# Patient Record
Sex: FEMALE | Race: WHITE | HISPANIC OR LATINO | Employment: OTHER | URBAN - METROPOLITAN AREA
[De-identification: names, ages, dates, MRNs, and addresses within clinical notes are randomized per-mention and may not be internally consistent; named-entity substitution may affect disease eponyms.]

---

## 2019-08-18 ENCOUNTER — HOSPITAL ENCOUNTER (INPATIENT)
Facility: MEDICAL CENTER | Age: 69
LOS: 1 days | DRG: 066 | End: 2019-08-21
Attending: EMERGENCY MEDICINE | Admitting: HOSPITALIST
Payer: MEDICARE

## 2019-08-18 ENCOUNTER — APPOINTMENT (OUTPATIENT)
Dept: RADIOLOGY | Facility: MEDICAL CENTER | Age: 69
DRG: 066 | End: 2019-08-18
Attending: EMERGENCY MEDICINE
Payer: MEDICARE

## 2019-08-18 DIAGNOSIS — R79.89 ELEVATED TROPONIN: ICD-10-CM

## 2019-08-18 DIAGNOSIS — E86.0 DEHYDRATION: ICD-10-CM

## 2019-08-18 DIAGNOSIS — R42 VERTIGO: ICD-10-CM

## 2019-08-18 DIAGNOSIS — I10 ESSENTIAL HYPERTENSION: ICD-10-CM

## 2019-08-18 DIAGNOSIS — I63.81 STROKE, LACUNAR (HCC): ICD-10-CM

## 2019-08-18 DIAGNOSIS — E11.65 TYPE 2 DIABETES MELLITUS WITH HYPERGLYCEMIA, WITHOUT LONG-TERM CURRENT USE OF INSULIN (HCC): ICD-10-CM

## 2019-08-18 DIAGNOSIS — R11.2 NON-INTRACTABLE VOMITING WITH NAUSEA, UNSPECIFIED VOMITING TYPE: ICD-10-CM

## 2019-08-18 LAB
ALBUMIN SERPL BCP-MCNC: 3.9 G/DL (ref 3.2–4.9)
ALBUMIN/GLOB SERPL: 1.2 G/DL
ALP SERPL-CCNC: 97 U/L (ref 30–99)
ALT SERPL-CCNC: 24 U/L (ref 2–50)
ANION GAP SERPL CALC-SCNC: 12 MMOL/L (ref 0–11.9)
AST SERPL-CCNC: 17 U/L (ref 12–45)
BASOPHILS # BLD AUTO: 0.3 % (ref 0–1.8)
BASOPHILS # BLD: 0.03 K/UL (ref 0–0.12)
BILIRUB SERPL-MCNC: 0.5 MG/DL (ref 0.1–1.5)
BUN SERPL-MCNC: 18 MG/DL (ref 8–22)
CALCIUM SERPL-MCNC: 9.4 MG/DL (ref 8.5–10.5)
CHLORIDE SERPL-SCNC: 101 MMOL/L (ref 96–112)
CO2 SERPL-SCNC: 25 MMOL/L (ref 20–33)
CREAT SERPL-MCNC: 0.6 MG/DL (ref 0.5–1.4)
EKG IMPRESSION: NORMAL
EOSINOPHIL # BLD AUTO: 0.14 K/UL (ref 0–0.51)
EOSINOPHIL NFR BLD: 1.4 % (ref 0–6.9)
ERYTHROCYTE [DISTWIDTH] IN BLOOD BY AUTOMATED COUNT: 50.4 FL (ref 35.9–50)
GLOBULIN SER CALC-MCNC: 3.2 G/DL (ref 1.9–3.5)
GLUCOSE BLD-MCNC: 190 MG/DL (ref 65–99)
GLUCOSE SERPL-MCNC: 201 MG/DL (ref 65–99)
HCT VFR BLD AUTO: 41.3 % (ref 37–47)
HGB BLD-MCNC: 13.8 G/DL (ref 12–16)
IMM GRANULOCYTES # BLD AUTO: 0.02 K/UL (ref 0–0.11)
IMM GRANULOCYTES NFR BLD AUTO: 0.2 % (ref 0–0.9)
LYMPHOCYTES # BLD AUTO: 3.01 K/UL (ref 1–4.8)
LYMPHOCYTES NFR BLD: 29.1 % (ref 22–41)
MCH RBC QN AUTO: 31.3 PG (ref 27–33)
MCHC RBC AUTO-ENTMCNC: 33.4 G/DL (ref 33.6–35)
MCV RBC AUTO: 93.7 FL (ref 81.4–97.8)
MONOCYTES # BLD AUTO: 0.59 K/UL (ref 0–0.85)
MONOCYTES NFR BLD AUTO: 5.7 % (ref 0–13.4)
NEUTROPHILS # BLD AUTO: 6.57 K/UL (ref 2–7.15)
NEUTROPHILS NFR BLD: 63.3 % (ref 44–72)
NRBC # BLD AUTO: 0 K/UL
NRBC BLD-RTO: 0 /100 WBC
PLATELET # BLD AUTO: 172 K/UL (ref 164–446)
PMV BLD AUTO: 10.4 FL (ref 9–12.9)
POTASSIUM SERPL-SCNC: 3.8 MMOL/L (ref 3.6–5.5)
PROT SERPL-MCNC: 7.1 G/DL (ref 6–8.2)
RBC # BLD AUTO: 4.41 M/UL (ref 4.2–5.4)
SODIUM SERPL-SCNC: 138 MMOL/L (ref 135–145)
TROPONIN T SERPL-MCNC: 21 NG/L (ref 6–19)
TROPONIN T SERPL-MCNC: 22 NG/L (ref 6–19)
WBC # BLD AUTO: 10.4 K/UL (ref 4.8–10.8)

## 2019-08-18 PROCEDURE — 93005 ELECTROCARDIOGRAM TRACING: CPT

## 2019-08-18 PROCEDURE — 84484 ASSAY OF TROPONIN QUANT: CPT

## 2019-08-18 PROCEDURE — 71045 X-RAY EXAM CHEST 1 VIEW: CPT

## 2019-08-18 PROCEDURE — 80053 COMPREHEN METABOLIC PANEL: CPT

## 2019-08-18 PROCEDURE — 93005 ELECTROCARDIOGRAM TRACING: CPT | Performed by: EMERGENCY MEDICINE

## 2019-08-18 PROCEDURE — 70450 CT HEAD/BRAIN W/O DYE: CPT

## 2019-08-18 PROCEDURE — 700102 HCHG RX REV CODE 250 W/ 637 OVERRIDE(OP): Performed by: EMERGENCY MEDICINE

## 2019-08-18 PROCEDURE — 85025 COMPLETE CBC W/AUTO DIFF WBC: CPT

## 2019-08-18 PROCEDURE — 99285 EMERGENCY DEPT VISIT HI MDM: CPT

## 2019-08-18 PROCEDURE — 96374 THER/PROPH/DIAG INJ IV PUSH: CPT

## 2019-08-18 PROCEDURE — 83036 HEMOGLOBIN GLYCOSYLATED A1C: CPT

## 2019-08-18 PROCEDURE — A9270 NON-COVERED ITEM OR SERVICE: HCPCS | Performed by: EMERGENCY MEDICINE

## 2019-08-18 PROCEDURE — 700111 HCHG RX REV CODE 636 W/ 250 OVERRIDE (IP): Performed by: EMERGENCY MEDICINE

## 2019-08-18 PROCEDURE — 96372 THER/PROPH/DIAG INJ SC/IM: CPT

## 2019-08-18 PROCEDURE — 82962 GLUCOSE BLOOD TEST: CPT

## 2019-08-18 RX ORDER — ENALAPRIL MALEATE 10 MG/1
10 TABLET ORAL DAILY
Status: ON HOLD | COMMUNITY
End: 2019-08-28

## 2019-08-18 RX ORDER — LORAZEPAM 2 MG/ML
0.5 INJECTION INTRAMUSCULAR ONCE
Status: COMPLETED | OUTPATIENT
Start: 2019-08-18 | End: 2019-08-18

## 2019-08-18 RX ORDER — SODIUM PHOSPHATE,MONO-DIBASIC 19G-7G/118
500 ENEMA (ML) RECTAL DAILY
Status: ON HOLD | COMMUNITY
End: 2019-08-28

## 2019-08-18 RX ORDER — MECLIZINE HYDROCHLORIDE 25 MG/1
25 TABLET ORAL EVERY 6 HOURS PRN
Qty: 20 TAB | Refills: 0 | Status: ON HOLD | OUTPATIENT
Start: 2019-08-18 | End: 2019-08-28

## 2019-08-18 RX ORDER — ONDANSETRON 4 MG/1
4 TABLET, ORALLY DISINTEGRATING ORAL EVERY 8 HOURS PRN
Qty: 10 TAB | Refills: 0 | Status: ON HOLD | OUTPATIENT
Start: 2019-08-18 | End: 2019-08-28

## 2019-08-18 RX ORDER — MECLIZINE HYDROCHLORIDE 25 MG/1
25 TABLET ORAL ONCE
Status: COMPLETED | OUTPATIENT
Start: 2019-08-18 | End: 2019-08-18

## 2019-08-18 RX ORDER — LORAZEPAM 0.5 MG/1
0.5 TABLET ORAL EVERY 8 HOURS PRN
Qty: 15 TAB | Refills: 0 | Status: ON HOLD | OUTPATIENT
Start: 2019-08-18 | End: 2019-08-28

## 2019-08-18 RX ADMIN — LORAZEPAM 0.5 MG: 2 INJECTION INTRAMUSCULAR; INTRAVENOUS at 22:18

## 2019-08-18 RX ADMIN — MECLIZINE HYDROCHLORIDE 25 MG: 25 TABLET ORAL at 20:32

## 2019-08-18 SDOH — HEALTH STABILITY: MENTAL HEALTH: HOW OFTEN DO YOU HAVE A DRINK CONTAINING ALCOHOL?: NEVER

## 2019-08-19 ENCOUNTER — APPOINTMENT (OUTPATIENT)
Dept: RADIOLOGY | Facility: MEDICAL CENTER | Age: 69
DRG: 066 | End: 2019-08-19
Attending: INTERNAL MEDICINE
Payer: MEDICARE

## 2019-08-19 PROBLEM — E11.65 TYPE 2 DIABETES MELLITUS WITH HYPERGLYCEMIA, WITHOUT LONG-TERM CURRENT USE OF INSULIN (HCC): Status: ACTIVE | Noted: 2019-08-19

## 2019-08-19 PROBLEM — R42 VERTIGO: Status: ACTIVE | Noted: 2019-08-19

## 2019-08-19 PROBLEM — R79.89 ELEVATED TROPONIN: Status: ACTIVE | Noted: 2019-08-19

## 2019-08-19 PROBLEM — I10 ESSENTIAL HYPERTENSION: Status: ACTIVE | Noted: 2019-08-19

## 2019-08-19 LAB
EKG IMPRESSION: NORMAL
EKG IMPRESSION: NORMAL
EST. AVERAGE GLUCOSE BLD GHB EST-MCNC: 235 MG/DL
GLUCOSE BLD-MCNC: 105 MG/DL (ref 65–99)
GLUCOSE BLD-MCNC: 119 MG/DL (ref 65–99)
GLUCOSE BLD-MCNC: 123 MG/DL (ref 65–99)
GLUCOSE BLD-MCNC: 245 MG/DL (ref 65–99)
HBA1C MFR BLD: 9.8 % (ref 0–5.6)
TROPONIN T SERPL-MCNC: 17 NG/L (ref 6–19)

## 2019-08-19 PROCEDURE — 700105 HCHG RX REV CODE 258: Performed by: EMERGENCY MEDICINE

## 2019-08-19 PROCEDURE — 82962 GLUCOSE BLOOD TEST: CPT | Mod: 91

## 2019-08-19 PROCEDURE — G0378 HOSPITAL OBSERVATION PER HR: HCPCS

## 2019-08-19 PROCEDURE — 700117 HCHG RX CONTRAST REV CODE 255: Performed by: INTERNAL MEDICINE

## 2019-08-19 PROCEDURE — 99243 OFF/OP CNSLTJ NEW/EST LOW 30: CPT | Performed by: PSYCHIATRY & NEUROLOGY

## 2019-08-19 PROCEDURE — 36415 COLL VENOUS BLD VENIPUNCTURE: CPT

## 2019-08-19 PROCEDURE — 700105 HCHG RX REV CODE 258: Performed by: INTERNAL MEDICINE

## 2019-08-19 PROCEDURE — 93005 ELECTROCARDIOGRAM TRACING: CPT | Performed by: HOSPITALIST

## 2019-08-19 PROCEDURE — 84484 ASSAY OF TROPONIN QUANT: CPT

## 2019-08-19 PROCEDURE — 700102 HCHG RX REV CODE 250 W/ 637 OVERRIDE(OP): Performed by: INTERNAL MEDICINE

## 2019-08-19 PROCEDURE — 96372 THER/PROPH/DIAG INJ SC/IM: CPT

## 2019-08-19 PROCEDURE — 99220 PR INITIAL OBSERVATION CARE,LEVL III: CPT | Performed by: INTERNAL MEDICINE

## 2019-08-19 PROCEDURE — 700111 HCHG RX REV CODE 636 W/ 250 OVERRIDE (IP): Performed by: INTERNAL MEDICINE

## 2019-08-19 PROCEDURE — A9270 NON-COVERED ITEM OR SERVICE: HCPCS | Performed by: INTERNAL MEDICINE

## 2019-08-19 PROCEDURE — 93010 ELECTROCARDIOGRAM REPORT: CPT | Performed by: INTERNAL MEDICINE

## 2019-08-19 PROCEDURE — 70498 CT ANGIOGRAPHY NECK: CPT

## 2019-08-19 PROCEDURE — 93005 ELECTROCARDIOGRAM TRACING: CPT | Performed by: INTERNAL MEDICINE

## 2019-08-19 PROCEDURE — 70551 MRI BRAIN STEM W/O DYE: CPT

## 2019-08-19 PROCEDURE — 70496 CT ANGIOGRAPHY HEAD: CPT

## 2019-08-19 RX ORDER — ACETAMINOPHEN 325 MG/1
650 TABLET ORAL EVERY 6 HOURS PRN
Status: DISCONTINUED | OUTPATIENT
Start: 2019-08-19 | End: 2019-08-21 | Stop reason: HOSPADM

## 2019-08-19 RX ORDER — ATORVASTATIN CALCIUM 80 MG/1
80 TABLET, FILM COATED ORAL EVERY EVENING
Status: DISCONTINUED | OUTPATIENT
Start: 2019-08-19 | End: 2019-08-21 | Stop reason: HOSPADM

## 2019-08-19 RX ORDER — HYDRALAZINE HYDROCHLORIDE 20 MG/ML
10 INJECTION INTRAMUSCULAR; INTRAVENOUS
Status: DISCONTINUED | OUTPATIENT
Start: 2019-08-19 | End: 2019-08-21 | Stop reason: HOSPADM

## 2019-08-19 RX ORDER — ONDANSETRON 2 MG/ML
4 INJECTION INTRAMUSCULAR; INTRAVENOUS EVERY 4 HOURS PRN
Status: DISCONTINUED | OUTPATIENT
Start: 2019-08-19 | End: 2019-08-21 | Stop reason: HOSPADM

## 2019-08-19 RX ORDER — AMOXICILLIN 250 MG
2 CAPSULE ORAL 2 TIMES DAILY
Status: DISCONTINUED | OUTPATIENT
Start: 2019-08-19 | End: 2019-08-21 | Stop reason: HOSPADM

## 2019-08-19 RX ORDER — LABETALOL HYDROCHLORIDE 5 MG/ML
10 INJECTION, SOLUTION INTRAVENOUS EVERY 4 HOURS PRN
Status: DISCONTINUED | OUTPATIENT
Start: 2019-08-19 | End: 2019-08-21 | Stop reason: HOSPADM

## 2019-08-19 RX ORDER — SODIUM CHLORIDE 9 MG/ML
INJECTION, SOLUTION INTRAVENOUS CONTINUOUS
Status: DISCONTINUED | OUTPATIENT
Start: 2019-08-19 | End: 2019-08-19

## 2019-08-19 RX ORDER — POLYETHYLENE GLYCOL 3350 17 G/17G
1 POWDER, FOR SOLUTION ORAL
Status: DISCONTINUED | OUTPATIENT
Start: 2019-08-19 | End: 2019-08-21 | Stop reason: HOSPADM

## 2019-08-19 RX ORDER — SODIUM CHLORIDE 9 MG/ML
INJECTION, SOLUTION INTRAVENOUS CONTINUOUS
Status: DISCONTINUED | OUTPATIENT
Start: 2019-08-19 | End: 2019-08-21 | Stop reason: HOSPADM

## 2019-08-19 RX ORDER — ASPIRIN 81 MG/1
324 TABLET, CHEWABLE ORAL DAILY
Status: DISCONTINUED | OUTPATIENT
Start: 2019-08-19 | End: 2019-08-21

## 2019-08-19 RX ORDER — ONDANSETRON 4 MG/1
4 TABLET, ORALLY DISINTEGRATING ORAL EVERY 4 HOURS PRN
Status: DISCONTINUED | OUTPATIENT
Start: 2019-08-19 | End: 2019-08-21 | Stop reason: HOSPADM

## 2019-08-19 RX ORDER — MECLIZINE HYDROCHLORIDE 25 MG/1
25 TABLET ORAL 3 TIMES DAILY PRN
Status: DISCONTINUED | OUTPATIENT
Start: 2019-08-19 | End: 2019-08-20

## 2019-08-19 RX ORDER — ASPIRIN 300 MG/1
300 SUPPOSITORY RECTAL DAILY
Status: DISCONTINUED | OUTPATIENT
Start: 2019-08-19 | End: 2019-08-21

## 2019-08-19 RX ORDER — ASPIRIN 325 MG
325 TABLET ORAL DAILY
Status: DISCONTINUED | OUTPATIENT
Start: 2019-08-19 | End: 2019-08-21

## 2019-08-19 RX ORDER — BISACODYL 10 MG
10 SUPPOSITORY, RECTAL RECTAL
Status: DISCONTINUED | OUTPATIENT
Start: 2019-08-19 | End: 2019-08-21 | Stop reason: HOSPADM

## 2019-08-19 RX ADMIN — SODIUM CHLORIDE: 9 INJECTION, SOLUTION INTRAVENOUS at 00:21

## 2019-08-19 RX ADMIN — SODIUM CHLORIDE: 9 INJECTION, SOLUTION INTRAVENOUS at 02:00

## 2019-08-19 RX ADMIN — ASPIRIN 325 MG: 325 TABLET, FILM COATED ORAL at 03:14

## 2019-08-19 RX ADMIN — SODIUM CHLORIDE: 9 INJECTION, SOLUTION INTRAVENOUS at 17:36

## 2019-08-19 RX ADMIN — ENOXAPARIN SODIUM 40 MG: 100 INJECTION SUBCUTANEOUS at 05:57

## 2019-08-19 RX ADMIN — ATORVASTATIN CALCIUM 80 MG: 80 TABLET, FILM COATED ORAL at 17:34

## 2019-08-19 RX ADMIN — IOHEXOL 80 ML: 350 INJECTION, SOLUTION INTRAVENOUS at 02:58

## 2019-08-19 RX ADMIN — INSULIN HUMAN 2 UNITS: 100 INJECTION, SOLUTION PARENTERAL at 17:42

## 2019-08-19 RX ADMIN — SENNOSIDES, DOCUSATE SODIUM 2 TABLET: 50; 8.6 TABLET, FILM COATED ORAL at 06:00

## 2019-08-19 ASSESSMENT — LIFESTYLE VARIABLES
EVER_SMOKED: NEVER
CONSUMPTION TOTAL: NEGATIVE
TOTAL SCORE: 0
ALCOHOL_USE: NO
TOTAL SCORE: 0
EVER FELT BAD OR GUILTY ABOUT YOUR DRINKING: NO
HAVE PEOPLE ANNOYED YOU BY CRITICIZING YOUR DRINKING: NO
HAVE YOU EVER FELT YOU SHOULD CUT DOWN ON YOUR DRINKING: NO
EVER HAD A DRINK FIRST THING IN THE MORNING TO STEADY YOUR NERVES TO GET RID OF A HANGOVER: NO
AVERAGE NUMBER OF DAYS PER WEEK YOU HAVE A DRINK CONTAINING ALCOHOL: 0
ON A TYPICAL DAY WHEN YOU DRINK ALCOHOL HOW MANY DRINKS DO YOU HAVE: 0
ALCOHOL_USE: NO
TOTAL SCORE: 0
HOW MANY TIMES IN THE PAST YEAR HAVE YOU HAD 5 OR MORE DRINKS IN A DAY: 0
EVER_SMOKED: NEVER

## 2019-08-19 ASSESSMENT — ENCOUNTER SYMPTOMS
SHORTNESS OF BREATH: 1
FOCAL WEAKNESS: 0
MYALGIAS: 0
DIARRHEA: 0
DIZZINESS: 1
BRUISES/BLEEDS EASILY: 0
BLOOD IN STOOL: 0
COUGH: 0
ABDOMINAL PAIN: 0
WHEEZING: 0
SEIZURES: 0
CHILLS: 0
BLURRED VISION: 0
HEADACHES: 0
DIAPHORESIS: 0
VOMITING: 1
PALPITATIONS: 0
FLANK PAIN: 0
FEVER: 0
SORE THROAT: 0
NECK PAIN: 0
BACK PAIN: 0
SPUTUM PRODUCTION: 0
NAUSEA: 1

## 2019-08-19 ASSESSMENT — COGNITIVE AND FUNCTIONAL STATUS - GENERAL
DRESSING REGULAR LOWER BODY CLOTHING: A LOT
SUGGESTED CMS G CODE MODIFIER MOBILITY: CK
WALKING IN HOSPITAL ROOM: A LOT
MOVING FROM LYING ON BACK TO SITTING ON SIDE OF FLAT BED: A LITTLE
DRESSING REGULAR UPPER BODY CLOTHING: A LITTLE
SUGGESTED CMS G CODE MODIFIER DAILY ACTIVITY: CK
HELP NEEDED FOR BATHING: A LITTLE
TURNING FROM BACK TO SIDE WHILE IN FLAT BAD: A LITTLE
PERSONAL GROOMING: A LITTLE
STANDING UP FROM CHAIR USING ARMS: A LOT
DAILY ACTIVITIY SCORE: 17
MOBILITY SCORE: 15
MOVING TO AND FROM BED TO CHAIR: A LITTLE
TOILETING: A LOT
CLIMB 3 TO 5 STEPS WITH RAILING: A LOT

## 2019-08-19 ASSESSMENT — PATIENT HEALTH QUESTIONNAIRE - PHQ9
1. LITTLE INTEREST OR PLEASURE IN DOING THINGS: NOT AT ALL
SUM OF ALL RESPONSES TO PHQ9 QUESTIONS 1 AND 2: 0
2. FEELING DOWN, DEPRESSED, IRRITABLE, OR HOPELESS: NOT AT ALL

## 2019-08-19 NOTE — ED NOTES
Pt reports she remains with some dizziness with eyes open, some nausea. Pt medicated per MAR. Pt resting quietly, cardiopulmonary monitoring remains in place. Family at bedside.

## 2019-08-19 NOTE — PROGRESS NOTES
The attending hospitalist beginning at 08:00 is Dr. Danny Ramsey, please call this physician for orders, updates, and questions.

## 2019-08-19 NOTE — ASSESSMENT & PLAN NOTE
Uncontrolled with hyperglycemia  Start on insulin sliding scale with serial Accu-Checks  hemoglobin A1c 9.8  Hypoglycemic protocol in place  Started patient on Januvia

## 2019-08-19 NOTE — ASSESSMENT & PLAN NOTE
Denied active chest pain but did report shortness of breath, rule out ACS  Continuous cardiac monitoring with serial EKG and troponin  2D echo found no segmental hypokinesis  Patient has been given full dose of aspirin

## 2019-08-19 NOTE — ED NOTES
Family remains at bedside.  Updated on POC.  Awaiting room assignment.  FSBG = 105.  Swallow eval completed by NOC shift RN, given breakfast tray.

## 2019-08-19 NOTE — ASSESSMENT & PLAN NOTE
Severe and sudden onset vertigo concerning for posterior circulation stroke  Patient is past the tPA window  CT of the head found no large vessel occlusion and not a candidate for neuro intervention  Patient will be placed on continuous cardiac monitoring to evaluate for any arrhythmias  Q4 hours neuro checks  MRI of the brain found an lacunar stroke  Check 2-D echo found no evidence of emboli  Patient will be started on full dose aspirin and high intensity statin  Presence of hypertension discontinued by neurology  Check TSH, lipid panel and hemoglobin A1c  PTOT and ST evaluation  Meclizine as needed

## 2019-08-19 NOTE — ED NOTES
"Chief Complaint   Patient presents with   • Dizziness     \"room spinning\"   • N/V   • Shortness of Breath     started around 12     Pt wheeled to triage, Cam[place] speaking with above complaints.   fsbs 120  Pt to ekg, protocol ordered.  "

## 2019-08-19 NOTE — ED NOTES
Med rec updated and complete. Allergies reviewed.  All medications come from [place]. Pt actually does not have active prescriptions from [place].

## 2019-08-19 NOTE — ED NOTES
Assist RN:  PT returned from CT Scan.  Required 2 person assist to ambulate to BR.  Demonstrated a lack of balance and listing gait to L side.  Reeducated about falls precautions.  Call light at bedside.

## 2019-08-19 NOTE — ED PROVIDER NOTES
"ED Provider Note    CHIEF COMPLAINT  Chief Complaint   Patient presents with   • Dizziness     \"room spinning\"   • N/V   • Shortness of Breath     started around 12       HPI  Noemi Lopez is a 69 y.o. female who presents with severe spinning dizziness whenever she opens her eyes patient and has vomited perhaps 20 times secondary to the dizziness.  Onset at 10 AM.  Vertigo abates when she closes her eyes and does not move her head.  Denies chest pain.  No headache, facial droop, speech difficulty or focal weakness.  She has never had a stroke or a TIA.  She does have diabetes and hypertension.  No atrial fibrillation or known heart disease.    REVIEW OF SYSTEMS  Pertinent positives include: Spinning dizziness with eye opening and head turning, vertigo triggering vomiting.  Pertinent negatives include: Diarrhea, fever, abdominal pain, chest pain, focal weakness.  10+ systems reviewed and negative.      PAST MEDICAL HISTORY  Past Medical History:   Diagnosis Date   • Diabetes (HCC)    • Hypertension        SOCIAL HISTORY  Social History     Tobacco Use   • Smoking status: Never Smoker   • Smokeless tobacco: Never Used   Substance Use Topics   • Alcohol use: Never     Frequency: Never   • Drug use: Never     Social History     Substance and Sexual Activity   Drug Use Never       CURRENT MEDICATIONS  No current facility-administered medications for this encounter.      Current Outpatient Medications   Medication Sig Dispense Refill   • enalapril (VASOTEC) 10 MG Tab Take 10 mg by mouth every day.     • metFORMIN (GLUCOPHAGE) 850 MG Tab Take 850 mg by mouth 2 times a day, with meals.     • GLUCOSAMINE-CHONDROITIN PO Take  by mouth.     • Non Formulary Request Indications: glibenclamida 5mg tab daily         ALLERGIES  Allergies   Allergen Reactions   • Pcn [Penicillins]    • Thiamine        PHYSICAL EXAM  VITAL SIGNS: /85   Pulse 87   Temp 36.4 °C (97.5 °F) (Temporal)   Resp 16   Wt 67 kg (147 lb 11.3 " oz)   SpO2 96%   Reviewed and elevated blood pressure  Constitutional: Well developed, Well nourished, appearing holding her eyes closed.  HENT: Normocephalic, atraumatic, bilateral external ears normal, oropharynx moist, No exudates or erythema.   Eyes: PERRLA, conjunctiva pink, no scleral icterus.  No nystagmus on eye opening but eye-opening does trigger vertigo  Cardiovascular: Regular S1-S2 without murmur, rub, gallop.  No dependent edema or calf cords, no JVD or bruit.  Respiratory: No rales, rhonchi, wheeze.  Gastrointestinal: Soft, nontender.  Skin: No erythema, no rash.   Genitourinary:  No costovertebral angle tenderness.   Neurologic: Alert & oriented x 3, cranial nerves 2-12 intact, grasp, biceps, extensor hallucis longus and ankle plantarflexion symmetric.  Finger-nose-finger and fine motor without dysmetria.  No focal deficit noted.  Psychiatric: Affect normal, Judgment normal, Mood normal.     DIFFERENTIAL DIAGNOSIS:  Benign positional vertigo, vestibular neuronitis, acoustic neuroma, doubt Ménière's disease, cerebeller hemorrhage, TIA, Stroke  EKG  EKG Interpretation 7:55 PM    Interpreted by me.  Indication: Vertigo    Rhythm: normal sinus   Rate: Normal at 83  Axis: normal  Ectopy: none  Conduction: normal  ST/T Waves: Half millimeter ST elevation in 2, 3 and aVF.  No ectopy.  Q Waves: none  R Wave progression: normal  Comparison: None    Clinical Impression: Sinus rhythm with nonspecific inferior ST change  .    RADIOLOGY/PROCEDURES  CT-HEAD W/O   Final Result      No CT evidence of acute infarct, hemorrhage or mass.      DX-CHEST-PORTABLE (1 VIEW)   Final Result      1.  There is no acute cardiopulmonary process. The cardiac silhouette is upper limits of normal in size.       LABORATORY:  Results for orders placed or performed during the hospital encounter of 08/18/19   CBC WITH DIFFERENTIAL   Result Value Ref Range    WBC 10.4 4.8 - 10.8 K/uL    RBC 4.41 4.20 - 5.40 M/uL    Hemoglobin 13.8 12.0  - 16.0 g/dL    Hematocrit 41.3 37.0 - 47.0 %    MCV 93.7 81.4 - 97.8 fL    MCH 31.3 27.0 - 33.0 pg    MCHC 33.4 (L) 33.6 - 35.0 g/dL    RDW 50.4 (H) 35.9 - 50.0 fL    Platelet Count 172 164 - 446 K/uL    MPV 10.4 9.0 - 12.9 fL    Neutrophils-Polys 63.30 44.00 - 72.00 %    Lymphocytes 29.10 22.00 - 41.00 %    Monocytes 5.70 0.00 - 13.40 %    Eosinophils 1.40 0.00 - 6.90 %    Basophils 0.30 0.00 - 1.80 %    Immature Granulocytes 0.20 0.00 - 0.90 %    Nucleated RBC 0.00 /100 WBC    Neutrophils (Absolute) 6.57 2.00 - 7.15 K/uL    Lymphs (Absolute) 3.01 1.00 - 4.80 K/uL    Monos (Absolute) 0.59 0.00 - 0.85 K/uL    Eos (Absolute) 0.14 0.00 - 0.51 K/uL    Baso (Absolute) 0.03 0.00 - 0.12 K/uL    Immature Granulocytes (abs) 0.02 0.00 - 0.11 K/uL    NRBC (Absolute) 0.00 K/uL   COMP METABOLIC PANEL   Result Value Ref Range    Sodium 138 135 - 145 mmol/L    Potassium 3.8 3.6 - 5.5 mmol/L    Chloride 101 96 - 112 mmol/L    Co2 25 20 - 33 mmol/L    Anion Gap 12.0 (H) 0.0 - 11.9    Glucose 201 (H) 65 - 99 mg/dL    Bun 18 8 - 22 mg/dL    Creatinine 0.60 0.50 - 1.40 mg/dL    Calcium 9.4 8.5 - 10.5 mg/dL    AST(SGOT) 17 12 - 45 U/L    ALT(SGPT) 24 2 - 50 U/L    Alkaline Phosphatase 97 30 - 99 U/L    Total Bilirubin 0.5 0.1 - 1.5 mg/dL    Albumin 3.9 3.2 - 4.9 g/dL    Total Protein 7.1 6.0 - 8.2 g/dL    Globulin 3.2 1.9 - 3.5 g/dL    A-G Ratio 1.2 g/dL   TROPONIN   Result Value Ref Range    Troponin T 21 (H) 6 - 19 ng/L   TROPONIN   Result Value Ref Range    Troponin T 22 (H) 6 - 19 ng/L       INTERVENTIONS:  meclizine (ANTIVERT) tablet 25 mg (25 mg Oral Given 8/18/19 2032)   LORazepam (ATIVAN) injection 0.5 mg (0.5 mg Intravenous Given 8/18/19 2218)     Response: improved vertigo    COURSE & MEDICAL DECISION MAKING  This patient presents with transient severe vertigo and vomiting associated with eye-opening head turning.  The the intensity of the symptoms and the improvement with eye closing and holding still suggests that she has  peripheral vertigo more than central vertigo.  She is improved with meclizine.  There is no cerebellar hemorrhage or mass.  There is no evidence of prior infarct.  Patient also had nonspecific ST elevation concerning for possible ischemia.  She had an indeterminate elevation of troponin on initial testing.  I ordered a second troponin and ask Dr. Liriano to check the results.  Since the second troponin baljinder the patient was admitted to the hospitalist service.    PLAN:  [place] for serial troponins and observation on telemetry, possible stress testing, further work-up and treatment of vertigo    CONDITION: Fair    FINAL IMPRESSION  1. Vertigo    2. Non-intractable vomiting with nausea, unspecified vomiting type    3. Dehydration    4. Elevated troponin          Electronically signed by: Neal Payne, 8/18/2019 7:54 PM

## 2019-08-19 NOTE — H&P
Hospital Medicine History & Physical Note    Date of Service  8/19/2019    Primary Care Physician  No primary care provider on file.    Consultants  None    Code Status  Full code    Chief Complaint  Dizziness    History of Presenting Illness  69 y.o. female with a past medical history of hypertension and type 2 diabetes mellitus who presented 8/18/2019 with severe dizziness that started yesterday.  History is limited at the time of my interview as the patient had received IV Ativan and Antivert prior to my assessment.  Apparently the patient reported constant and severe dizziness that started yesterday evening, which she described as the room spinning around her head associated with nausea and vomiting.  Her symptoms are worsened with moving her head or changing positions.  She also reported shortness of breath.  She denied any headache, fevers, chest pain or focal weakness.  There is no reported history of stroke or MI.  In the ER she had persistent nausea with multiple episodes of vomiting for which she was given Zofran, IV Ativan and meclizine with improvement of her symptoms.    EKG interpreted by me reveals sinus rhythm with low voltage.  No ST elevation or ST depression noted  Chest x-ray interpreted by me reveals no acute cardiopulmonary process    Review of Systems  Review of Systems   Constitutional: Negative for chills, diaphoresis and fever.   HENT: Negative for hearing loss and sore throat.    Eyes: Negative for blurred vision.   Respiratory: Positive for shortness of breath. Negative for cough, sputum production and wheezing.    Cardiovascular: Negative for chest pain, palpitations and leg swelling.   Gastrointestinal: Positive for nausea and vomiting. Negative for abdominal pain, blood in stool and diarrhea.   Genitourinary: Negative for dysuria, flank pain and urgency.   Musculoskeletal: Negative for back pain, joint pain, myalgias and neck pain.   Skin: Negative for rash.   Neurological: Positive  for dizziness. Negative for focal weakness, seizures and headaches.   Endo/Heme/Allergies: Does not bruise/bleed easily.   Psychiatric/Behavioral: Negative for suicidal ideas.   All other systems reviewed and are negative.      Past Medical History   has a past medical history of Diabetes (HCC) and Hypertension.    Surgical History  No pertinent surgical history    Family History  No pertinent family history    Social History   reports that she has never smoked. She has never used smokeless tobacco. She reports that she does not drink alcohol or use drugs.    Allergies  Allergies   Allergen Reactions   • Pcn [Penicillins]    • Thiamine        Medications  Prior to Admission Medications   Prescriptions Last Dose Informant Patient Reported? Taking?   Non Formulary Request 8/18/2019 at 0830 Patient Yes Yes   Sig: Take 5 mg by mouth 2 Times a Day. Indications: glibenclamida 5mg tab daily   enalapril (VASOTEC) 10 MG Tab 8/18/2019 at 0830 Patient Yes Yes   Sig: Take 10 mg by mouth every day.   glucosamine Sulfate 500 MG Cap 8/18/2019 at 0830 Patient Yes Yes   Sig: Take 500 mg by mouth every day.   metFORMIN (GLUCOPHAGE) 850 MG Tab 8/18/2019 at 0830 Patient Yes Yes   Sig: Take 850 mg by mouth 2 times a day, with meals.      Facility-Administered Medications: None       Physical Exam  Temp:  [36.4 °C (97.5 °F)] 36.4 °C (97.5 °F)  Pulse:  [77-88] 84  Resp:  [16-18] 18  BP: (106-149)/(59-85) 122/68  SpO2:  [92 %-96 %] 94 %    Physical Exam   Constitutional: She appears well-developed and well-nourished. No distress.   HENT:   Head: Normocephalic and atraumatic.   Mouth/Throat: Oropharynx is clear and moist.   Eyes: Pupils are equal, round, and reactive to light. Conjunctivae are normal. Right eye exhibits no discharge. Left eye exhibits no discharge. No scleral icterus.   Neck: Normal range of motion. Neck supple. No tracheal deviation present.   Cardiovascular: Normal rate, regular rhythm and normal heart sounds.    Pulmonary/Chest: Effort normal and breath sounds normal. No stridor. No respiratory distress. She has no wheezes. She has no rales.   Abdominal: Soft. Bowel sounds are normal. She exhibits no distension. There is no tenderness. There is no rebound and no guarding.   Musculoskeletal: Normal range of motion. She exhibits no edema, tenderness or deformity.   Lymphadenopathy:     She has no cervical adenopathy.   Neurological:   Sedated  Difficulty following commands due to sedation  Apparent left sided upper and lower ext weakness compared to right         Skin: Skin is warm and dry. No rash noted. She is not diaphoretic. No erythema.   Psychiatric: She has a normal mood and affect. Her behavior is normal.   Nursing note and vitals reviewed.      Laboratory:  Recent Labs     08/18/19 1928   WBC 10.4   RBC 4.41   HEMOGLOBIN 13.8   HEMATOCRIT 41.3   MCV 93.7   MCH 31.3   MCHC 33.4*   RDW 50.4*   PLATELETCT 172   MPV 10.4     Recent Labs     08/18/19 1928   SODIUM 138   POTASSIUM 3.8   CHLORIDE 101   CO2 25   GLUCOSE 201*   BUN 18   CREATININE 0.60   CALCIUM 9.4     Recent Labs     08/18/19 1928   ALTSGPT 24   ASTSGOT 17   ALKPHOSPHAT 97   TBILIRUBIN 0.5   GLUCOSE 201*         No results for input(s): NTPROBNP in the last 72 hours.      Recent Labs     08/18/19 2005 08/18/19  2220   TROPONINT 21* 22*       Urinalysis:    No results found     Imaging:  CT-HEAD W/O   Final Result      No CT evidence of acute infarct, hemorrhage or mass.      DX-CHEST-PORTABLE (1 VIEW)   Final Result      1.  There is no acute cardiopulmonary process. The cardiac silhouette is upper limits of normal in size.      EC-ECHOCARDIOGRAM COMPLETE W/O CONT    (Results Pending)   CT-CTA HEAD WITH & W/O-POST PROCESS    (Results Pending)   CT-CTA NECK WITH & W/O-POST PROCESSING    (Results Pending)   MR-BRAIN-W/O    (Results Pending)         Assessment/Plan:  I anticipate this patient is appropriate for observation status at this  time.    Vertigo- (present on admission)  Assessment & Plan  Severe and sudden onset vertigo concerning for posterior circulation stroke  Patient is past the tPA window  Will order stat CTA head and neck with IV contrast to evaluate for large vessel occlusion  Patient will be placed on continuous cardiac monitoring to evaluate for any arrhythmias  Q4 hours neuro checks  I will order MRI brain  Check 2-D echo  Patient will be started on full dose aspirin and high intensity statin  Allow permissive hypertension  Check TSH, lipid panel and hemoglobin A1c  PTOT and ST evaluation  Meclizine as needed      Elevated troponin- (present on admission)  Assessment & Plan  Denied active chest pain but did report shortness of breath, rule out ACS  Continuous cardiac monitoring with serial EKG and troponin  Check 2D echo  Patient has been given full dose of aspirin  Check lipid panel, TSH and hemoglobin A1c        Essential hypertension- (present on admission)  Assessment & Plan  Allow permissive hypertension at this time  IV hydralazine and labetalol as needed for SBP greater than 220 or DBP greater than 120    Type 2 diabetes mellitus with hyperglycemia, without long-term current use of insulin (HCC)- (present on admission)  Assessment & Plan  Uncontrolled with hyperglycemia  Start on insulin sliding scale with serial Accu-Checks  Check hemoglobin A1c  Hypoglycemic protocol in place          VTE prophylaxis: Lovenox

## 2019-08-19 NOTE — ED NOTES
Patient returned to MRI.  Ambulatory to BR w/1 assist, voiding w/out difficulty.  Family remains at bedside.  Updated on POC.  Fall precautions in place.  Call light within reach.  Awaiting room assignment.

## 2019-08-19 NOTE — ED NOTES
Morning lab draw, EKG, POC glucose, meds given. Pt denies needs or questions at this time. Family remains at bedside.

## 2019-08-19 NOTE — ED PROVIDER NOTES
ED Provider Note    Addendum    I was given signout from my colleague Dr. Payne and follow-up on troponin.  I have reviewed the initial H&P as provided by my colleague.  At this point the troponin has gone up by one-point which is likely negligible however given no other specific finding for the patient's current complaints I am concerned that there is a possibility of underlying ACS.  She has had no formal cardiac evaluation as she [place] in [place].  She does take medication for diabetes which can further cloud the picture of an ACS presentation.  Furthermore with further conversation with the family they note that she has profound fatigue and weakness and is largely not ambulatory secondary to this.  This furthermore supports my concern about need for ongoing inpatient care.  Given the fact that she does clinically appear slightly dehydrated I will start some IV fluids while awaiting bed assignment.  I have in the interim discussed the case with the hospitalist Dr. Ramsey which is agreeable to ongoing inpatient care at this time.

## 2019-08-19 NOTE — ED NOTES
PT appears dizzy and unable to transfer to bedside without full assist, began vomiting rust colored vomit with movement.

## 2019-08-19 NOTE — ED NOTES
Pt rounded on, asleep in bed, respirations even and unlabored, repositioning self as needed, family at bedside.

## 2019-08-20 ENCOUNTER — APPOINTMENT (OUTPATIENT)
Dept: CARDIOLOGY | Facility: MEDICAL CENTER | Age: 69
DRG: 066 | End: 2019-08-20
Attending: INTERNAL MEDICINE
Payer: MEDICARE

## 2019-08-20 PROBLEM — I63.81 STROKE, LACUNAR (HCC): Status: ACTIVE | Noted: 2019-08-19

## 2019-08-20 LAB
ANION GAP SERPL CALC-SCNC: 7 MMOL/L (ref 0–11.9)
BASOPHILS # BLD AUTO: 0.5 % (ref 0–1.8)
BASOPHILS # BLD: 0.05 K/UL (ref 0–0.12)
BUN SERPL-MCNC: 22 MG/DL (ref 8–22)
CALCIUM SERPL-MCNC: 8.4 MG/DL (ref 8.5–10.5)
CHLORIDE SERPL-SCNC: 108 MMOL/L (ref 96–112)
CHOLEST SERPL-MCNC: 162 MG/DL (ref 100–199)
CO2 SERPL-SCNC: 22 MMOL/L (ref 20–33)
CREAT SERPL-MCNC: 0.78 MG/DL (ref 0.5–1.4)
EKG IMPRESSION: NORMAL
EOSINOPHIL # BLD AUTO: 0.19 K/UL (ref 0–0.51)
EOSINOPHIL NFR BLD: 2.1 % (ref 0–6.9)
ERYTHROCYTE [DISTWIDTH] IN BLOOD BY AUTOMATED COUNT: 54.4 FL (ref 35.9–50)
GLUCOSE BLD-MCNC: 161 MG/DL (ref 65–99)
GLUCOSE BLD-MCNC: 230 MG/DL (ref 65–99)
GLUCOSE BLD-MCNC: 241 MG/DL (ref 65–99)
GLUCOSE BLD-MCNC: 247 MG/DL (ref 65–99)
GLUCOSE BLD-MCNC: 306 MG/DL (ref 65–99)
GLUCOSE SERPL-MCNC: 269 MG/DL (ref 65–99)
HCT VFR BLD AUTO: 39.2 % (ref 37–47)
HDLC SERPL-MCNC: 46 MG/DL
HGB BLD-MCNC: 12.3 G/DL (ref 12–16)
IMM GRANULOCYTES # BLD AUTO: 0.02 K/UL (ref 0–0.11)
IMM GRANULOCYTES NFR BLD AUTO: 0.2 % (ref 0–0.9)
LDLC SERPL CALC-MCNC: 100 MG/DL
LV EJECT FRACT  99904: 70
LV EJECT FRACT MOD 2C 99903: 60.51
LV EJECT FRACT MOD 4C 99902: 70.05
LV EJECT FRACT MOD BP 99901: 67.56
LYMPHOCYTES # BLD AUTO: 3.96 K/UL (ref 1–4.8)
LYMPHOCYTES NFR BLD: 43.1 % (ref 22–41)
MCH RBC QN AUTO: 30.4 PG (ref 27–33)
MCHC RBC AUTO-ENTMCNC: 31.4 G/DL (ref 33.6–35)
MCV RBC AUTO: 96.8 FL (ref 81.4–97.8)
MONOCYTES # BLD AUTO: 0.67 K/UL (ref 0–0.85)
MONOCYTES NFR BLD AUTO: 7.3 % (ref 0–13.4)
NEUTROPHILS # BLD AUTO: 4.29 K/UL (ref 2–7.15)
NEUTROPHILS NFR BLD: 46.8 % (ref 44–72)
NRBC # BLD AUTO: 0 K/UL
NRBC BLD-RTO: 0 /100 WBC
PLATELET # BLD AUTO: 148 K/UL (ref 164–446)
PMV BLD AUTO: 10.7 FL (ref 9–12.9)
POTASSIUM SERPL-SCNC: 4.2 MMOL/L (ref 3.6–5.5)
RBC # BLD AUTO: 4.05 M/UL (ref 4.2–5.4)
SODIUM SERPL-SCNC: 137 MMOL/L (ref 135–145)
TRIGL SERPL-MCNC: 80 MG/DL (ref 0–149)
WBC # BLD AUTO: 9.2 K/UL (ref 4.8–10.8)

## 2019-08-20 PROCEDURE — 700111 HCHG RX REV CODE 636 W/ 250 OVERRIDE (IP): Performed by: INTERNAL MEDICINE

## 2019-08-20 PROCEDURE — 82962 GLUCOSE BLOOD TEST: CPT

## 2019-08-20 PROCEDURE — 36415 COLL VENOUS BLD VENIPUNCTURE: CPT

## 2019-08-20 PROCEDURE — 93010 ELECTROCARDIOGRAM REPORT: CPT | Performed by: INTERNAL MEDICINE

## 2019-08-20 PROCEDURE — 96372 THER/PROPH/DIAG INJ SC/IM: CPT

## 2019-08-20 PROCEDURE — A9270 NON-COVERED ITEM OR SERVICE: HCPCS | Performed by: INTERNAL MEDICINE

## 2019-08-20 PROCEDURE — 99291 CRITICAL CARE FIRST HOUR: CPT | Performed by: HOSPITALIST

## 2019-08-20 PROCEDURE — 97162 PT EVAL MOD COMPLEX 30 MIN: CPT

## 2019-08-20 PROCEDURE — 93306 TTE W/DOPPLER COMPLETE: CPT | Mod: 26 | Performed by: INTERNAL MEDICINE

## 2019-08-20 PROCEDURE — 80061 LIPID PANEL: CPT

## 2019-08-20 PROCEDURE — 97165 OT EVAL LOW COMPLEX 30 MIN: CPT

## 2019-08-20 PROCEDURE — 93306 TTE W/DOPPLER COMPLETE: CPT

## 2019-08-20 PROCEDURE — 700102 HCHG RX REV CODE 250 W/ 637 OVERRIDE(OP): Performed by: HOSPITALIST

## 2019-08-20 PROCEDURE — 80048 BASIC METABOLIC PNL TOTAL CA: CPT

## 2019-08-20 PROCEDURE — 700102 HCHG RX REV CODE 250 W/ 637 OVERRIDE(OP): Performed by: INTERNAL MEDICINE

## 2019-08-20 PROCEDURE — 700105 HCHG RX REV CODE 258: Performed by: INTERNAL MEDICINE

## 2019-08-20 PROCEDURE — 770020 HCHG ROOM/CARE - TELE (206)

## 2019-08-20 PROCEDURE — 85025 COMPLETE CBC W/AUTO DIFF WBC: CPT

## 2019-08-20 PROCEDURE — A9270 NON-COVERED ITEM OR SERVICE: HCPCS | Performed by: HOSPITALIST

## 2019-08-20 PROCEDURE — 99231 SBSQ HOSP IP/OBS SF/LOW 25: CPT | Performed by: PSYCHIATRY & NEUROLOGY

## 2019-08-20 RX ORDER — LORAZEPAM 1 MG/1
0.5 TABLET ORAL EVERY 8 HOURS PRN
Status: DISCONTINUED | OUTPATIENT
Start: 2019-08-20 | End: 2019-08-21 | Stop reason: HOSPADM

## 2019-08-20 RX ORDER — ENALAPRIL MALEATE 10 MG/1
10 TABLET ORAL DAILY
Status: DISCONTINUED | OUTPATIENT
Start: 2019-08-21 | End: 2019-08-21 | Stop reason: HOSPADM

## 2019-08-20 RX ORDER — MECLIZINE HYDROCHLORIDE 25 MG/1
25 TABLET ORAL EVERY 6 HOURS PRN
Status: DISCONTINUED | OUTPATIENT
Start: 2019-08-20 | End: 2019-08-21 | Stop reason: HOSPADM

## 2019-08-20 RX ADMIN — INSULIN HUMAN 2 UNITS: 100 INJECTION, SOLUTION PARENTERAL at 17:52

## 2019-08-20 RX ADMIN — ENOXAPARIN SODIUM 40 MG: 100 INJECTION SUBCUTANEOUS at 05:13

## 2019-08-20 RX ADMIN — INSULIN HUMAN 2 UNITS: 100 INJECTION, SOLUTION PARENTERAL at 00:22

## 2019-08-20 RX ADMIN — INSULIN HUMAN 1 UNITS: 100 INJECTION, SOLUTION PARENTERAL at 05:11

## 2019-08-20 RX ADMIN — INSULIN HUMAN 2 UNITS: 100 INJECTION, SOLUTION PARENTERAL at 11:33

## 2019-08-20 RX ADMIN — ASPIRIN 325 MG: 325 TABLET, FILM COATED ORAL at 05:13

## 2019-08-20 RX ADMIN — SODIUM CHLORIDE: 9 INJECTION, SOLUTION INTRAVENOUS at 17:57

## 2019-08-20 RX ADMIN — ATORVASTATIN CALCIUM 80 MG: 80 TABLET, FILM COATED ORAL at 17:54

## 2019-08-20 RX ADMIN — MECLIZINE HYDROCHLORIDE 25 MG: 25 TABLET ORAL at 21:14

## 2019-08-20 RX ADMIN — SODIUM CHLORIDE: 9 INJECTION, SOLUTION INTRAVENOUS at 03:51

## 2019-08-20 ASSESSMENT — ENCOUNTER SYMPTOMS
BACK PAIN: 0
BLURRED VISION: 0
DIARRHEA: 0
TINGLING: 0
WHEEZING: 0
POLYDIPSIA: 0
DEPRESSION: 0
TREMORS: 0
CHILLS: 0
CLAUDICATION: 0
STRIDOR: 0
MYALGIAS: 0
ORTHOPNEA: 0
NECK PAIN: 0
BLOOD IN STOOL: 0
PHOTOPHOBIA: 0
SPUTUM PRODUCTION: 0
FEVER: 0
HEMOPTYSIS: 0
DIZZINESS: 1
CONSTIPATION: 0
HALLUCINATIONS: 0
EYE PAIN: 0
HEARTBURN: 0
ABDOMINAL PAIN: 0
BRUISES/BLEEDS EASILY: 0
SORE THROAT: 0
VOMITING: 1
FLANK PAIN: 0
WEAKNESS: 0
PALPITATIONS: 0
NAUSEA: 1
PND: 0
SHORTNESS OF BREATH: 0
COUGH: 0
HEADACHES: 1
DOUBLE VISION: 0
SINUS PAIN: 0
DIAPHORESIS: 0
FALLS: 0

## 2019-08-20 ASSESSMENT — COGNITIVE AND FUNCTIONAL STATUS - GENERAL
MOBILITY SCORE: 19
WALKING IN HOSPITAL ROOM: A LITTLE
DRESSING REGULAR LOWER BODY CLOTHING: A LITTLE
TOILETING: A LITTLE
EATING MEALS: A LITTLE
DRESSING REGULAR UPPER BODY CLOTHING: A LITTLE
CLIMB 3 TO 5 STEPS WITH RAILING: A LITTLE
MOVING TO AND FROM BED TO CHAIR: A LITTLE
PERSONAL GROOMING: A LITTLE
SUGGESTED CMS G CODE MODIFIER DAILY ACTIVITY: CK
STANDING UP FROM CHAIR USING ARMS: A LITTLE
DAILY ACTIVITIY SCORE: 18
SUGGESTED CMS G CODE MODIFIER MOBILITY: CK
HELP NEEDED FOR BATHING: A LITTLE
MOVING FROM LYING ON BACK TO SITTING ON SIDE OF FLAT BED: A LITTLE

## 2019-08-20 ASSESSMENT — LIFESTYLE VARIABLES: SUBSTANCE_ABUSE: 0

## 2019-08-20 ASSESSMENT — GAIT ASSESSMENTS
GAIT LEVEL OF ASSIST: CONTACT GUARD ASSIST
DISTANCE (FEET): 25
ASSISTIVE DEVICE: FRONT WHEEL WALKER

## 2019-08-20 ASSESSMENT — ACTIVITIES OF DAILY LIVING (ADL): TOILETING: INDEPENDENT

## 2019-08-20 NOTE — PROGRESS NOTES
2 RN skin check complete with Jess RN  Devices in place n/a.  Skin assessed under devices n/a.  Confirmed pressure ulcers found on n/a.  New potential pressure ulcers noted on n/a.   The following interventions in place frequent reminders to run, PRN bed changes r/t incontinence, barrier cream, heels floated on pillows.    BL heels red/dry/blanching/blggy  Moisture/pink under pannus and breast.

## 2019-08-20 NOTE — THERAPY
"Physical Therapy Evaluation completed.   Bed Mobility:  Supine to Sit: Supervised  Transfers: Sit to Stand: Stand by Assist  Gait: Level Of Assist: Contact Guard Assist with Front-Wheel Walker       Plan of Care: Will benefit from Physical Therapy 3 times per week  Discharge Recommendations: Equipment: Front-Wheel Walker. Post-acute therapy Discharge to a transitional care facility for continued skilled therapy services.    See \"Rehab Therapy-Acute\" Patient Summary Report for complete documentation.     Pt is a 69 y.o. female admitted to the hospital for dizziness. Pt demonstrates good functional mobility, but poor activity tolerance, ambulation skills, and balance. During ambulation, pt reported feeling tired, and then began to lose her balance toward her L side. Pt crossed her R foot over L to attempt recovery, but was unable to do so. I helped the pt recover her balance, and finished the walk with heavy guarding. Pt reports R eye vision deficits. I believe that this contributed to her LOB while ambulating. Pt has difficulty maintaining appropriate posture while ambulating. Pt family available during evaluation for hx and for translation. Pt family reports that the pt is far away from baseline at this time. Anticipate d/c to a transitional care facility to address her deficits before return home.   "

## 2019-08-20 NOTE — THERAPY
"Occupational Therapy Evaluation completed.   Functional Status:  OT eval completed on 68 YO F admitted with lacunar CVA. Pt demonstrated WFL for BUE AROM/strength/sensation and coordination. Pt reports dizziness with both eyes open however reports it is better when one eye is covered, RN asked to order eye patch. Pt R eye demonstrated decreased visual oculomotor pursuits with scanning up/down. Pt was min A for func mobs, toilet transfer, grooming while standing 2' decreased depth perception with one eye closed. Pt will be staying with son upon d/c prior to returning to [place]. Pt would benefit from  for home safety evaluation and outpatient vision therapy. Will continue to follow for acute OT services while in-house.   Plan of Care: Will benefit from Occupational Therapy 3 times per week  Discharge Recommendations:  Equipment: Will Continue to Assess for Equipment Needs. Recommend home health transitional care for continued occupational therapy services and outpatient vision therapy.    See \"Rehab Therapy-Acute\" Patient Summary Report for complete documentation.    "

## 2019-08-20 NOTE — CARE PLAN
Problem: Safety  Goal: Will remain free from injury  Outcome: PROGRESSING AS EXPECTED    Pt appropriately assessed for risk for falls. Appropriate sings placed and appropriate assistive devices used. Will continue to assess. Eduction on use and importance of call light given.

## 2019-08-20 NOTE — PROGRESS NOTES
Bedside report received. Bed locked and in low position. Family at bedside. Call light within reach and pt belongings within reach.

## 2019-08-20 NOTE — PROGRESS NOTES
· 2 RN skin check complete with MARTHA Villalpando.  · Devices in place: None  · Skin assessed under devices: N/A  · Confirmed pressure ulcers found on: None  · New potential pressure ulcers noted on: Sacrum is dark and blanching, lacerations and scratches all over trunk, legs, arms, and sacrum  · The following interventions in place: Pt. Is encouraged to turn frequently, ambulation encouraged, lacerations are closed but will continue to monitor and address appropriate wound protocols if anything changes. Photo of sacrum documented in flowsheets.

## 2019-08-20 NOTE — CONSULTS
CC: Stroke.     Date of Admission: 8/18/2019    Today's Date: 08/20/19    Consulting Physician: Danny Ramsey M.D.      HPI:    Noemi Lopez is a 69 y.o. Greenlandic Speaking Female visiting her family from [place] with pmhx of Type 2 DM, who is being seen in inpatient Neurological consultation for stroke.     The patient developed pinning with nausea and vomiting that began upon awakening yesterday, exacerbated by head movement. Symptoms began 11am 8/19/19, arrived in the ED 1900, outside of the therapeutic time window for TPA. She was found ti gave mildly elevated troponin of 21. CT Head was negative. CTA head/neck WNL. MRI showed acute R thalamic and midbrain ischemic infarct, both lacunar. Since yesterday, the dizziness has resolved. She does notice that she is veering toward the left when walking.       ROS:   Constitutional: No fevers or chills.  Eyes: No blurry vision or eye pain.  ENT: No dysphagia or hearing loss.  Respiratory: No cough or shortness of breath.  Cardiovascular: No chest pain or palpitations.  GI: No nausea, vomiting, or diarrhea.  : No urinary incontinence or dysuria.  Musculoskeletal: No joint swelling or arthralgias.  Skin: No skin rashes.  Neuro: No headaches, + dizziness, or tremors.  Endocrine: No heat or cold intolerance. No polydipsia or polyuria.  Psych: No depression or anxiety.  Heme/Lymph: No easy bruising or swollen lymph nodes.  All other systems were reviewed and were negative.     Past Medical History:   Past Medical History:   Diagnosis Date   • Diabetes (HCC)    • Hypertension        Past Surgical History: History reviewed. No pertinent surgical history.    Social History:   Social History     Socioeconomic History   • Marital status:      Spouse name: Not on file   • Number of children: Not on file   • Years of education: Not on file   • Highest education level: Not on file   Occupational History   • Not on file   Social Needs   • Financial resource strain:  Not on file   • Food insecurity:     Worry: Not on file     Inability: Not on file   • Transportation needs:     Medical: Not on file     Non-medical: Not on file   Tobacco Use   • Smoking status: Never Smoker   • Smokeless tobacco: Never Used   Substance and Sexual Activity   • Alcohol use: Never     Frequency: Never   • Drug use: Never   • Sexual activity: Not on file   Lifestyle   • Physical activity:     Days per week: Not on file     Minutes per session: Not on file   • Stress: Not on file   Relationships   • Social connections:     Talks on phone: Not on file     Gets together: Not on file     Attends Yazidi service: Not on file     Active member of club or organization: Not on file     Attends meetings of clubs or organizations: Not on file     Relationship status: Not on file   • Intimate partner violence:     Fear of current or ex partner: Not on file     Emotionally abused: Not on file     Physically abused: Not on file     Forced sexual activity: Not on file   Other Topics Concern   • Not on file   Social History Narrative   • Not on file       Family History: History reviewed. No pertinent family history.    Allergies:   Allergies   Allergen Reactions   • Pcn [Penicillins]    • Thiamine          Current Facility-Administered Medications:   •  senna-docusate (PERICOLACE or SENOKOT S) 8.6-50 MG per tablet 2 Tab, 2 Tab, Oral, BID, Stopped at 08/19/19 1800 **AND** polyethylene glycol/lytes (MIRALAX) PACKET 1 Packet, 1 Packet, Oral, QDAY PRN **AND** magnesium hydroxide (MILK OF MAGNESIA) suspension 30 mL, 30 mL, Oral, QDAY PRN **AND** bisacodyl (DULCOLAX) suppository 10 mg, 10 mg, Rectal, QDAY PRN, Karan Ramsey M.D.  •  Pharmacy consult request - Allow for permissive hypertension: SBP up to 220 mmHg/DBP up to 120 mmHg x 48 hours, , Other, PHARMACY TO DOSE, Karan Ramsey M.D.  •  Respiratory Care per Protocol, , Nebulization, Continuous RT, Karan Ramsey M.D.  •  NS infusion, , Intravenous, ContinuousKaran  SEAMUS Ramsey, Last Rate: 100 mL/hr at 08/20/19 0351  •  enoxaparin (LOVENOX) inj 40 mg, 40 mg, Subcutaneous, DAILY, Karan Ramsey M.D., 40 mg at 08/20/19 0513  •  acetaminophen (TYLENOL) tablet 650 mg, 650 mg, Oral, Q6HRS PRN, Karan Ramsey M.D.  •  ondansetron (ZOFRAN) syringe/vial injection 4 mg, 4 mg, Intravenous, Q4HRS PRN, Karan Ramsey M.D.  •  ondansetron (ZOFRAN ODT) dispertab 4 mg, 4 mg, Oral, Q4HRS PRN, Karan Ramsey M.D.  •  aspirin (ASA) tablet 325 mg, 325 mg, Oral, DAILY, 325 mg at 08/20/19 0513 **OR** aspirin (ASA) chewable tab 324 mg, 324 mg, Oral, DAILY **OR** aspirin (ASA) suppository 300 mg, 300 mg, Rectal, DAILY, Karan Ramsey M.D.  •  atorvastatin (LIPITOR) tablet 80 mg, 80 mg, Oral, Q EVENING, Karan Ramsey M.D., 80 mg at 08/19/19 1734  •  labetalol (NORMODYNE,TRANDATE) injection 10 mg, 10 mg, Intravenous, Q4HRS PRN **OR** hydrALAZINE (APRESOLINE) injection 10 mg, 10 mg, Intravenous, Q2HRS PRN, Karan Ramsey M.D.  •  insulin regular (HUMULIN R) injection 1-6 Units, 1-6 Units, Subcutaneous, Q6HRS, 2 Units at 08/20/19 1133 **AND** Accu-Chek Q6 if NPO, , , Q6H **AND** NOTIFY MD and PharmD, , , Once **AND** glucose 4 g chewable tablet 16 g, 16 g, Oral, Q15 MIN PRN **AND** DEXTROSE 10% BOLUS 250 mL, 250 mL, Intravenous, Q15 MIN PRN, Karan Ramsey M.D.  •  meclizine (ANTIVERT) tablet 25 mg, 25 mg, Oral, TID PRN, Karan Ramsey M.D.      Physical Exam:   Ambulatory Vitals  Encounter Vitals  Temperature: 36.1 °C (97 °F)  Temp src: Temporal  Blood Pressure : 137/76  BP Location: Left, Upper Arm  Patient BP Position: Supine  Pulse: 88  Respiration: 18  Pulse Oximetry: 93 %  O2 (LPM): 0  O2 Delivery: None (Room Air)  Weight: 67.3 kg (148 lb 5.9 oz)  Weight Source: Stand Up Scale  Height: 152.4 cm (5')    Constitutional: Well-developed, well-nourished, good hygiene. Appears stated age.  Cardiovascular: RRR, with no murmurs, rubs or gallops. No carotid bruits. No peripheral edema, pedal pulses intact.   Respiratory:  Lungs CTA B/L, no W/R/R. Respiratory effort appears normal.    Skin: Warm, dry, intact. No rashes observed.  Eyes: Sclera anicteric. No eyelid ptosis.  Abdomen: Soft NTTP. No hepatosplenomegaly.   ENMT: Good dentition.  Oropharynx clear.   Extremities: No swelling or edema.  Neck: Supple, no thyromegaly.  Neurologic:   Mental Status: Awake, alert, oriented to person, place, and time.   Speech: Fluent with normal prosody.   Memory: Able to recall recent and remote events accurately.    Concentration: Attentive. Able to focus on history and follow multi-step commands.              Fund of Knowledge: Appropriate   Cranial Nerves:     CN II: PERRL. No afferent pupillary defect.    CN III, IV, VI: Good eye closure, EOMI.     CN V: Facial sensation intact and symmetric.     CN VII: No facial asymmetry.     CN VIII: Hearing intact.     CN IX and X: Palate elevates symmetrically. Normal gag reflex.    CN XI: Symmetric shoulder shrug.     CN XII: Tongue midline.    Sensory: Intact light touch, vibration and temperature.    Coordination: Slight past pointing with F to N on the right.     DTR's: 2+ throughout without clonus.    Babinski: Toes downgoing bilaterally.   Movements: No tremors observed.   Musculoskeletal:    Strength: 5/5 in upper and lower extremities bilaterally.   Tone: Normal bulk and tone.   Joints: No swelling in the joints of hands or knees.     Labs:  Recent Labs     08/18/19 1928 08/20/19  0024   WBC 10.4 9.2   RBC 4.41 4.05*   HEMOGLOBIN 13.8 12.3   HEMATOCRIT 41.3 39.2   MCV 93.7 96.8   MCH 31.3 30.4   RDW 50.4* 54.4*   PLATELETCT 172 148*   MPV 10.4 10.7   NEUTSPOLYS 63.30 46.80   LYMPHOCYTES 29.10 43.10*   MONOCYTES 5.70 7.30   EOSINOPHILS 1.40 2.10   BASOPHILS 0.30 0.50       Recent Labs     08/18/19 1928 08/20/19  0024   SODIUM 138 137   POTASSIUM 3.8 4.2   CHLORIDE 101 108   CO2 25 22   GLUCOSE 201* 269*   BUN 18 22   Results for CAITLYN KIRAN (MRN [id number]) as of 8/20/2019 13:20   Ref.  Range 8/20/2019 00:24   Cholesterol,Tot Latest Ref Range: 100 - 199 mg/dL 162   Triglycerides Latest Ref Range: 0 - 149 mg/dL 80   HDL Latest Ref Range: >=40 mg/dL 46   LDL Latest Ref Range: <100 mg/dL 100 (H)     Results for NOEMI KIRAN (MRN [id number]) as of 8/20/2019 13:20   Ref. Range 8/19/2019 17:33   Glucose - Accu-Ck Latest Ref Range: 65 - 99 mg/dL 245 (H)       Imaging Reviewed:     MRI Brain w/o from 8/20/19.   1.  Acute lacunar infarcts in the right paramedian dorsal midbrain and right thalamus.  2.  Mild chronic microvascular ischemic disease.  3.  Mild cerebral volume loss.    Assessment/Plan:    Assessment/Plan:  Ms. Noemi Kiran is a 70 yo F with h/o HTN, DM2, who developed acute onset dizziness and vertigo yesterday morning outside of the time window for TPA who was found to have lacunar strokes in the paramedian dorsal midbrain and right thalamus in addition to having an elevated troponin level. Most likely related to hypertension which could have led to demand ischemia. Using the  line, I explained to the patient and her family the etiology of her stroke which is likely small vessel disease. Once she returns to [place], she should follow up with her PCP for control of her risk factors: DM2 and HTN. SHe should continue ASA 81mg indefinitely and take a statin as well.     Plan:  1. ASA 81mg PO daily.  2. Continue Statin   3. BP goals <140/90.  4. PT/OT/Speech consultations.   5. Blood glucose control.   6. Please call with further questions.     Odalys Galloway D.O., M.P.H  MS specialist.   Board Certified Neurologist.  Neurology Clerkship Director, [facility].    Neurology,  [facility].   Tel: [phone]  Fax: [phone]

## 2019-08-20 NOTE — CARE PLAN
Problem: Communication  Goal: The ability to communicate needs accurately and effectively will improve  Outcome: PROGRESSING AS EXPECTED    Pt verbalizes understanding of plan of care and procedures.  used appropriately. Will continue to update pt on plan of care.

## 2019-08-20 NOTE — CONSULTS
CC: Dizziness, vertigo nausea and vomiting    Date of Admission: 8/18/2019    Today's Date: 08/19/19    Consulting Physician: Danny Ramsey M.D.      HPI:    Noemi Lopez is a 69 y.o. female with sudden onset dizziness yesterday at 11 AM followed by nausea and vomiting that was severe.  Patient woke up feeling not quite herself yesterday morning per her daughter and granddaughter.  Patient is Romanian-speaking so history was from her granddaughter primarily.  Obtained no other complaints.  Patient's granddaughter stated she is a very stoic person and does not like to complain.  Patient is visiting from [place] where she lives full-time.      ROS:   PERTINENT POSITIVES IN HPI  All other systems were reviewed and were negative.  Patient denied headache.      Past Medical History:   Past Medical History:   Diagnosis Date   • Diabetes (HCC)    • Hypertension        Past Surgical History: History reviewed. No pertinent surgical history.    Social History:   Social History     Socioeconomic History   • Marital status:      Spouse name: Not on file   • Number of children: Not on file   • Years of education: Not on file   • Highest education level: Not on file   Occupational History   • Not on file   Social Needs   • Financial resource strain: Not on file   • Food insecurity:     Worry: Not on file     Inability: Not on file   • Transportation needs:     Medical: Not on file     Non-medical: Not on file   Tobacco Use   • Smoking status: Never Smoker   • Smokeless tobacco: Never Used   Substance and Sexual Activity   • Alcohol use: Never     Frequency: Never   • Drug use: Never   • Sexual activity: Not on file   Lifestyle   • Physical activity:     Days per week: Not on file     Minutes per session: Not on file   • Stress: Not on file   Relationships   • Social connections:     Talks on phone: Not on file     Gets together: Not on file     Attends Pentecostalism service: Not on file     Active member of club or  organization: Not on file     Attends meetings of clubs or organizations: Not on file     Relationship status: Not on file   • Intimate partner violence:     Fear of current or ex partner: Not on file     Emotionally abused: Not on file     Physically abused: Not on file     Forced sexual activity: Not on file   Other Topics Concern   • Not on file   Social History Narrative   • Not on file       Family History: History reviewed. No pertinent family history.  Patient lives in [place] and granddaughter is not certain of the complete family history at this point but will try to interview relatives tomorrow.  Granddaughter states she will have patient's family members from [place] call with more information.    HISTORIES AS ABOVE ARE INCOMPLETE IF PATIENT IS INTUBATED, OR UNABLE TO COMMUNICATE OR ELUCIDATE.     Allergies:   Allergies   Allergen Reactions   • Pcn [Penicillins]    • Thiamine          Current Facility-Administered Medications:   •  senna-docusate (PERICOLACE or SENOKOT S) 8.6-50 MG per tablet 2 Tab, 2 Tab, Oral, BID, Stopped at 08/19/19 1800 **AND** polyethylene glycol/lytes (MIRALAX) PACKET 1 Packet, 1 Packet, Oral, QDAY PRN **AND** magnesium hydroxide (MILK OF MAGNESIA) suspension 30 mL, 30 mL, Oral, QDAY PRN **AND** bisacodyl (DULCOLAX) suppository 10 mg, 10 mg, Rectal, QDAY PRN, Karan Ramsey M.D.  •  Pharmacy consult request - Allow for permissive hypertension: SBP up to 220 mmHg/DBP up to 120 mmHg x 48 hours, , Other, PHARMACY TO DOSE, Karan Ramsey M.D.  •  Respiratory Care per Protocol, , Nebulization, Continuous RT, Karan Ramsey M.D.  •  NS infusion, , Intravenous, Continuous, Karan Ramsey M.D., Last Rate: 100 mL/hr at 08/19/19 1736  •  enoxaparin (LOVENOX) inj 40 mg, 40 mg, Subcutaneous, DAILY, Karan Ramsey M.D., 40 mg at 08/19/19 0557  •  acetaminophen (TYLENOL) tablet 650 mg, 650 mg, Oral, Q6HRS PRN, Karan Ramsey M.D.  •  ondansetron (ZOFRAN) syringe/vial injection 4 mg, 4 mg, Intravenous, Q4HRS  PRN, Karan Ramsey M.D.  •  ondansetron (ZOFRAN ODT) dispertab 4 mg, 4 mg, Oral, Q4HRS PRN, Karan Ramsey M.D.  •  aspirin (ASA) tablet 325 mg, 325 mg, Oral, DAILY, 325 mg at 08/19/19 0314 **OR** aspirin (ASA) chewable tab 324 mg, 324 mg, Oral, DAILY **OR** aspirin (ASA) suppository 300 mg, 300 mg, Rectal, DAILY, Karan Ramsey M.D.  •  atorvastatin (LIPITOR) tablet 80 mg, 80 mg, Oral, Q EVENING, Karan Ramsey M.D., 80 mg at 08/19/19 1734  •  labetalol (NORMODYNE,TRANDATE) injection 10 mg, 10 mg, Intravenous, Q4HRS PRN **OR** hydrALAZINE (APRESOLINE) injection 10 mg, 10 mg, Intravenous, Q2HRS PRN, Karan Ramsey M.D.  •  insulin regular (HUMULIN R) injection 1-6 Units, 1-6 Units, Subcutaneous, Q6HRS, 2 Units at 08/19/19 1742 **AND** Accu-Chek Q6 if NPO, , , Q6H **AND** NOTIFY MD and PharmD, , , Once **AND** glucose 4 g chewable tablet 16 g, 16 g, Oral, Q15 MIN PRN **AND** DEXTROSE 10% BOLUS 250 mL, 250 mL, Intravenous, Q15 MIN PRN, Karan Ramsey M.D.  •  meclizine (ANTIVERT) tablet 25 mg, 25 mg, Oral, TID PRN, Karan Ramsey M.D.      PHYSICAL EXAM    Vitals:    08/19/19 1200 08/19/19 1215 08/19/19 1615 08/19/19 2000   BP: 105/53 123/67 129/79 124/76   Pulse: 86 86 67 89   Resp:  16 16 16   Temp:  37 °C (98.6 °F) 37.1 °C (98.8 °F) 36.4 °C (97.6 °F)   TempSrc:  Temporal Temporal Temporal   SpO2: 92% 92% 94% 93%   Weight:  66 kg (145 lb 8.1 oz)  67.3 kg (148 lb 5.9 oz)       Head/Neck: NCAT. no meningismus neg kernig neg brudzinski. No obvious mass or heard bruit. No tender arteries or lost pulses. No rash of head or neck. NO JVD.    Skin: Warm, dry, intact. No rashes observed head/neck or body    Eyes/Funduscopic: no papilledema/pallor.    Mental Status: Awake, alert, oriented x 3. Name/repeat/fluent/command follow intact. No neglect/extinction. Attention and concentration, recent & remote memory, Fund of Knowledge wnl.     Cranial Nerves: CN II-XII intact. PERR.   No afferent pupillary defect. EOM difficult to fully assess as  it makes patient very dizzy. VF full.  Positive nystagmus.       Motor:  Strength  intact, no abn mvmts.  bulk & tone wnl.      Sensory: symmetric to sharp     Coordination: mild dysmetria     DTR's: intact/sym. no clonus. Toes mute.    Gait/Station: n/a fall concern       NIHSS: 4    Labs:  Recent Labs     08/18/19 1928   WBC 10.4   RBC 4.41   HEMOGLOBIN 13.8   HEMATOCRIT 41.3   MCV 93.7   MCH 31.3   MCHC 33.4*   RDW 50.4*   PLATELETCT 172   MPV 10.4     Recent Labs     08/18/19 1928   SODIUM 138   POTASSIUM 3.8   CHLORIDE 101   CO2 25   GLUCOSE 201*   BUN 18   CREATININE 0.60   CALCIUM 9.4                     Recent Labs     08/18/19 1928   SODIUM 138   POTASSIUM 3.8   CHLORIDE 101   CO2 25   GLUCOSE 201*   BUN 18     Recent Labs     08/18/19 1928   SODIUM 138   POTASSIUM 3.8   CHLORIDE 101   CO2 25   BUN 18   CREATININE 0.60   CALCIUM 9.4         No results found for this or any previous visit.           Imaging: neuroimaging reviewed and directly visualized by me  MR-BRAIN-W/O   Final Result      1.  Acute lacunar infarcts in the right paramedian dorsal midbrain and right thalamus.   2.  Mild chronic microvascular ischemic disease.   3.  Mild cerebral volume loss.      CT-CTA HEAD WITH & W/O-POST PROCESS   Final Result      1.  No large vessel occlusion, high-grade stenosis or aneurysm of the [place].   2.  No acute infarct, hemorrhage or mass.      CT-CTA NECK WITH & W/O-POST PROCESSING   Final Result      No high-grade stenosis, large vessel occlusion, aneurysm or dissection.      CT-HEAD W/O   Final Result      No CT evidence of acute infarct, hemorrhage or mass.      DX-CHEST-PORTABLE (1 VIEW)   Final Result      1.  There is no acute cardiopulmonary process. The cardiac silhouette is upper limits of normal in size.      EC-ECHOCARDIOGRAM COMPLETE W/O CONT    (Results Pending)       Assessment/Plan:    Patient was observed to have CVA on MRI scan after being admitted for acute onset vertigo with  nausea and vomiting.  There is no previous history of stroke.  I reviewed the MRI myself which showed lacunar type strokes in the right paramedian rajan and right thalamus.  Patient did not receive TPA as she did not present to the emergency room until 19:15 yesterday.    CVA is small vessel thrombotic etiology with history of hypertension and diabetes.  CT CTA reviewed by myself did not show any large vessel occlusion or high-grade stenosis.  I reviewed the MRI scan myself which did show lacunar CVA as the cause of her vertigo symptoms.    Echocardiogram is unremarkable for cardioembolic source.    Patient received aspirin 325 mg and will continue daily.    Permissive hypertension as currently ordered.    Atorvastatin 80 mg    PT/OT/speech therapy ordered    Meclizine and ondansetron for vertigo and nausea ordered.    Diabetic control per hospitalist team.    Neurology team will continue to assess how patient is progressing.    Consulting Physician: Danny Ramsey M.D.     Melissa P Bloch, M.D.  , Neurohospitalist & Stroke  Clinical Professor, [facility] [employer] [facility] [employer]  Diplomate, Neurology & Neuroimaging

## 2019-08-20 NOTE — PROGRESS NOTES
Bedside report received. Patient A&O x4. No complaints of pain, pt is still dizzy. Ludy TUTTLE and I had her stand at bedside to get a standing weight. Pt is Azeri speaking, family at bedside. Family is informed of visiting hours.     POC discussed with patient. Patient verbalized understanding. Call light and belongings within reach. Bed locked and in lowest position, alarm and fall precautions in place.

## 2019-08-20 NOTE — PROGRESS NOTES
Patient admitted overnight by my colleague for dizziness.  MRI found evidence of stroke and neurology was consulted.  Continue permissive hypertension and aspirin.  PT eval is pending.

## 2019-08-21 ENCOUNTER — HOSPITAL ENCOUNTER (INPATIENT)
Facility: REHABILITATION | Age: 69
LOS: 7 days | DRG: 057 | End: 2019-08-28
Attending: PHYSICAL MEDICINE & REHABILITATION | Admitting: PHYSICAL MEDICINE & REHABILITATION
Payer: MEDICARE

## 2019-08-21 VITALS
DIASTOLIC BLOOD PRESSURE: 80 MMHG | HEART RATE: 103 BPM | OXYGEN SATURATION: 97 % | BODY MASS INDEX: 28.96 KG/M2 | TEMPERATURE: 97.7 F | RESPIRATION RATE: 16 BRPM | HEIGHT: 60 IN | WEIGHT: 147.49 LBS | SYSTOLIC BLOOD PRESSURE: 141 MMHG

## 2019-08-21 LAB
GLUCOSE BLD-MCNC: 152 MG/DL (ref 65–99)
GLUCOSE BLD-MCNC: 156 MG/DL (ref 65–99)
GLUCOSE BLD-MCNC: 198 MG/DL (ref 65–99)
GLUCOSE BLD-MCNC: 295 MG/DL (ref 65–99)

## 2019-08-21 PROCEDURE — 700102 HCHG RX REV CODE 250 W/ 637 OVERRIDE(OP): Performed by: PHYSICAL MEDICINE & REHABILITATION

## 2019-08-21 PROCEDURE — 700105 HCHG RX REV CODE 258: Performed by: INTERNAL MEDICINE

## 2019-08-21 PROCEDURE — 82962 GLUCOSE BLOOD TEST: CPT | Mod: 91

## 2019-08-21 PROCEDURE — 97535 SELF CARE MNGMENT TRAINING: CPT

## 2019-08-21 PROCEDURE — 700102 HCHG RX REV CODE 250 W/ 637 OVERRIDE(OP): Performed by: HOSPITALIST

## 2019-08-21 PROCEDURE — 770010 HCHG ROOM/CARE - REHAB SEMI PRIVAT*

## 2019-08-21 PROCEDURE — A9270 NON-COVERED ITEM OR SERVICE: HCPCS | Performed by: HOSPITALIST

## 2019-08-21 PROCEDURE — A9270 NON-COVERED ITEM OR SERVICE: HCPCS | Performed by: INTERNAL MEDICINE

## 2019-08-21 PROCEDURE — 700111 HCHG RX REV CODE 636 W/ 250 OVERRIDE (IP): Performed by: INTERNAL MEDICINE

## 2019-08-21 PROCEDURE — 99239 HOSP IP/OBS DSCHRG MGMT >30: CPT | Performed by: HOSPITALIST

## 2019-08-21 PROCEDURE — 700102 HCHG RX REV CODE 250 W/ 637 OVERRIDE(OP): Performed by: INTERNAL MEDICINE

## 2019-08-21 PROCEDURE — A9270 NON-COVERED ITEM OR SERVICE: HCPCS | Performed by: PHYSICAL MEDICINE & REHABILITATION

## 2019-08-21 PROCEDURE — 97166 OT EVAL MOD COMPLEX 45 MIN: CPT

## 2019-08-21 PROCEDURE — 99223 1ST HOSP IP/OBS HIGH 75: CPT | Performed by: PHYSICAL MEDICINE & REHABILITATION

## 2019-08-21 PROCEDURE — 94760 N-INVAS EAR/PLS OXIMETRY 1: CPT

## 2019-08-21 RX ORDER — AMOXICILLIN 250 MG
2 CAPSULE ORAL 2 TIMES DAILY
Status: DISCONTINUED | OUTPATIENT
Start: 2019-08-21 | End: 2019-08-28 | Stop reason: HOSPADM

## 2019-08-21 RX ORDER — ONDANSETRON 2 MG/ML
4 INJECTION INTRAMUSCULAR; INTRAVENOUS 4 TIMES DAILY PRN
Status: DISCONTINUED | OUTPATIENT
Start: 2019-08-21 | End: 2019-08-28 | Stop reason: HOSPADM

## 2019-08-21 RX ORDER — AMOXICILLIN 250 MG
2 CAPSULE ORAL 2 TIMES DAILY
Status: CANCELLED | OUTPATIENT
Start: 2019-08-21

## 2019-08-21 RX ORDER — POLYVINYL ALCOHOL 14 MG/ML
1 SOLUTION/ DROPS OPHTHALMIC PRN
Status: DISCONTINUED | OUTPATIENT
Start: 2019-08-21 | End: 2019-08-28 | Stop reason: HOSPADM

## 2019-08-21 RX ORDER — BISACODYL 10 MG
10 SUPPOSITORY, RECTAL RECTAL
Status: CANCELLED | OUTPATIENT
Start: 2019-08-21

## 2019-08-21 RX ORDER — ACETAMINOPHEN 325 MG/1
650 TABLET ORAL EVERY 4 HOURS PRN
Status: DISCONTINUED | OUTPATIENT
Start: 2019-08-21 | End: 2019-08-28 | Stop reason: HOSPADM

## 2019-08-21 RX ORDER — MECLIZINE HYDROCHLORIDE 25 MG/1
25 TABLET ORAL EVERY 6 HOURS PRN
Status: DISCONTINUED | OUTPATIENT
Start: 2019-08-21 | End: 2019-08-21

## 2019-08-21 RX ORDER — ONDANSETRON 4 MG/1
4 TABLET, ORALLY DISINTEGRATING ORAL 4 TIMES DAILY PRN
Status: DISCONTINUED | OUTPATIENT
Start: 2019-08-21 | End: 2019-08-28 | Stop reason: HOSPADM

## 2019-08-21 RX ORDER — ACETAMINOPHEN 325 MG/1
650 TABLET ORAL EVERY 6 HOURS PRN
Qty: 30 TAB | Refills: 0 | Status: ON HOLD | OUTPATIENT
Start: 2019-08-21 | End: 2019-08-28

## 2019-08-21 RX ORDER — ATORVASTATIN CALCIUM 80 MG/1
80 TABLET, FILM COATED ORAL EVERY EVENING
Status: CANCELLED | OUTPATIENT
Start: 2019-08-21

## 2019-08-21 RX ORDER — SCOLOPAMINE TRANSDERMAL SYSTEM 1 MG/1
1 PATCH, EXTENDED RELEASE TRANSDERMAL
Status: DISCONTINUED | OUTPATIENT
Start: 2019-08-21 | End: 2019-08-28 | Stop reason: HOSPADM

## 2019-08-21 RX ORDER — DEXTROSE MONOHYDRATE 25 G/50ML
50 INJECTION, SOLUTION INTRAVENOUS
Status: DISCONTINUED | OUTPATIENT
Start: 2019-08-21 | End: 2019-08-23

## 2019-08-21 RX ORDER — HYDROXYZINE HYDROCHLORIDE 25 MG/1
50 TABLET, FILM COATED ORAL EVERY 6 HOURS PRN
Status: DISCONTINUED | OUTPATIENT
Start: 2019-08-21 | End: 2019-08-28 | Stop reason: HOSPADM

## 2019-08-21 RX ORDER — ALUMINA, MAGNESIA, AND SIMETHICONE 2400; 2400; 240 MG/30ML; MG/30ML; MG/30ML
20 SUSPENSION ORAL
Status: DISCONTINUED | OUTPATIENT
Start: 2019-08-21 | End: 2019-08-28 | Stop reason: HOSPADM

## 2019-08-21 RX ORDER — ATORVASTATIN CALCIUM 40 MG/1
80 TABLET, FILM COATED ORAL EVERY EVENING
Status: DISCONTINUED | OUTPATIENT
Start: 2019-08-21 | End: 2019-08-28 | Stop reason: HOSPADM

## 2019-08-21 RX ORDER — ECHINACEA PURPUREA EXTRACT 125 MG
2 TABLET ORAL PRN
Status: DISCONTINUED | OUTPATIENT
Start: 2019-08-21 | End: 2019-08-28 | Stop reason: HOSPADM

## 2019-08-21 RX ORDER — HYDRALAZINE HYDROCHLORIDE 10 MG/1
10 TABLET, FILM COATED ORAL EVERY 8 HOURS PRN
Status: DISCONTINUED | OUTPATIENT
Start: 2019-08-21 | End: 2019-08-28 | Stop reason: HOSPADM

## 2019-08-21 RX ORDER — ENALAPRIL MALEATE 5 MG/1
5 TABLET ORAL
Status: DISCONTINUED | OUTPATIENT
Start: 2019-08-22 | End: 2019-08-28 | Stop reason: HOSPADM

## 2019-08-21 RX ORDER — LANOLIN ALCOHOL/MO/W.PET/CERES
3 CREAM (GRAM) TOPICAL NIGHTLY PRN
Status: DISCONTINUED | OUTPATIENT
Start: 2019-08-21 | End: 2019-08-28 | Stop reason: HOSPADM

## 2019-08-21 RX ORDER — POLYETHYLENE GLYCOL 3350 17 G/17G
1 POWDER, FOR SOLUTION ORAL
Status: CANCELLED | OUTPATIENT
Start: 2019-08-21

## 2019-08-21 RX ORDER — POLYETHYLENE GLYCOL 3350 17 G/17G
1 POWDER, FOR SOLUTION ORAL
Status: DISCONTINUED | OUTPATIENT
Start: 2019-08-21 | End: 2019-08-28 | Stop reason: HOSPADM

## 2019-08-21 RX ORDER — BISACODYL 10 MG
10 SUPPOSITORY, RECTAL RECTAL
Status: DISCONTINUED | OUTPATIENT
Start: 2019-08-21 | End: 2019-08-28 | Stop reason: HOSPADM

## 2019-08-21 RX ORDER — TRAZODONE HYDROCHLORIDE 50 MG/1
50 TABLET ORAL
Status: DISCONTINUED | OUTPATIENT
Start: 2019-08-21 | End: 2019-08-28 | Stop reason: HOSPADM

## 2019-08-21 RX ORDER — ATORVASTATIN CALCIUM 80 MG/1
80 TABLET, FILM COATED ORAL EVERY EVENING
Qty: 30 TAB | Refills: 0 | Status: ON HOLD | OUTPATIENT
Start: 2019-08-21 | End: 2019-08-28 | Stop reason: SDUPTHER

## 2019-08-21 RX ORDER — ASPIRIN 81 MG/1
81 TABLET ORAL DAILY
Qty: 30 TAB | Refills: 0 | Status: ON HOLD | OUTPATIENT
Start: 2019-08-21 | End: 2019-08-28 | Stop reason: SDUPTHER

## 2019-08-21 RX ORDER — MECLIZINE HYDROCHLORIDE 25 MG/1
25 TABLET ORAL EVERY 6 HOURS PRN
Status: CANCELLED | OUTPATIENT
Start: 2019-08-21

## 2019-08-21 RX ADMIN — ASPIRIN 325 MG: 325 TABLET, FILM COATED ORAL at 05:07

## 2019-08-21 RX ADMIN — SITAGLIPTIN 25 MG: 25 TABLET, FILM COATED ORAL at 05:08

## 2019-08-21 RX ADMIN — INSULIN HUMAN 2 UNITS: 100 INJECTION, SOLUTION PARENTERAL at 17:15

## 2019-08-21 RX ADMIN — SODIUM CHLORIDE 1000 ML: 9 INJECTION, SOLUTION INTRAVENOUS at 05:10

## 2019-08-21 RX ADMIN — ENOXAPARIN SODIUM 40 MG: 100 INJECTION SUBCUTANEOUS at 05:10

## 2019-08-21 RX ADMIN — ENALAPRIL MALEATE 10 MG: 10 TABLET ORAL at 05:08

## 2019-08-21 RX ADMIN — ATORVASTATIN CALCIUM 80 MG: 40 TABLET, FILM COATED ORAL at 21:52

## 2019-08-21 RX ADMIN — SCOPALAMINE 1 PATCH: 1 PATCH, EXTENDED RELEASE TRANSDERMAL at 17:37

## 2019-08-21 RX ADMIN — HYDRALAZINE HYDROCHLORIDE 10 MG: 20 INJECTION INTRAMUSCULAR; INTRAVENOUS at 08:09

## 2019-08-21 RX ADMIN — INSULIN HUMAN 2 UNITS: 100 INJECTION, SOLUTION PARENTERAL at 21:57

## 2019-08-21 RX ADMIN — INSULIN HUMAN 5 UNITS: 100 INJECTION, SOLUTION PARENTERAL at 12:00

## 2019-08-21 ASSESSMENT — LIFESTYLE VARIABLES
TOTAL SCORE: 0
EVER FELT BAD OR GUILTY ABOUT YOUR DRINKING: NO
TOTAL SCORE: 0
ALCOHOL_USE: NO
EVER HAD A DRINK FIRST THING IN THE MORNING TO STEADY YOUR NERVES TO GET RID OF A HANGOVER: NO
HAVE PEOPLE ANNOYED YOU BY CRITICIZING YOUR DRINKING: NO
TOTAL SCORE: 0
CONSUMPTION TOTAL: INCOMPLETE
EVER_SMOKED: NEVER
HAVE YOU EVER FELT YOU SHOULD CUT DOWN ON YOUR DRINKING: NO
DOES PATIENT WANT TO STOP DRINKING: NO

## 2019-08-21 ASSESSMENT — PATIENT HEALTH QUESTIONNAIRE - PHQ9
1. LITTLE INTEREST OR PLEASURE IN DOING THINGS: NOT AT ALL
2. FEELING DOWN, DEPRESSED, IRRITABLE, OR HOPELESS: NOT AT ALL
SUM OF ALL RESPONSES TO PHQ9 QUESTIONS 1 AND 2: 0

## 2019-08-21 ASSESSMENT — BRIEF INTERVIEW FOR MENTAL STATUS (BIMS)
WHAT DAY OF THE WEEK IS IT: CORRECT
WHAT MONTH IS IT: ACCURATE WITHIN 5 DAYS
ASKED TO RECALL BED: YES, NO CUE REQUIRED
ASKED TO RECALL SOCK: YES, AFTER CUEING (SOMETHING TO WEAR")"
WHAT YEAR IS IT: CORRECT
ASKED TO RECALL BLUE: YES, AFTER CUEING (A COLOR")"
INITIAL REPETITION OF BED BLUE SOCK - FIRST ATTEMPT: 3
BIMS SUMMARY SCORE: 13

## 2019-08-21 ASSESSMENT — ACTIVITIES OF DAILY LIVING (ADL): TOILETING: INDEPENDENT

## 2019-08-21 NOTE — CARE PLAN
Problem: Bowel/Gastric:  Goal: Normal bowel function is maintained or improved  Outcome: PROGRESSING AS EXPECTED     Problem: Knowledge Deficit  Goal: Knowledge of disease process/condition, treatment plan, diagnostic tests, and medications will improve  Outcome: PROGRESSING AS EXPECTED

## 2019-08-21 NOTE — DISCHARGE PLANNING
PMR order received from Dr. Ramsey.  MCR is shown for her medical provider.  Presented with c/o dizziness with N/V.  Found to have an acute lacunar infarcts in the right paramedian dorsal midbrain and right thalamus.  RPG to consult.  I do appreciate the referral.

## 2019-08-21 NOTE — DISCHARGE PLANNING
Hospital Care Management Team Assessment    In the case of an emergency, pt's NOK is Yaw Johnson (Son) [phone].     This RNCM spoke with pt at bedside and obtained the information used in this assessment. Pt verified accuracy of facesheet. Pt lives with her adult children in a a 1 story house. Pt uses the [facility] pharmacy on [address]. Prior to current hospitalization, pt was completely independent with ADLS/IADLS. Pt's son blaine her to and from her doctors appoitnments. Pt collects SS but unsure how much; she communicates that she does not have any financial concerns at this time. Pt denies hx of SA or MH. Pt has an incredibly involved familial support system. Pt's plan is to discharge to [facility] for strength training and then back home to her adult children.     Upon D/C, pt states that someone in her family will provide transport home, if applicable.     Information Source  Orientation : Oriented x 4  Information Given By: Patient  Informant's Name: Noemi  Who is responsible for making decisions for patient? : Patient    Readmission Evaluation  Is this a readmission?: No    Elopement Risk  Legal Hold: No  Ambulatory or Self Mobile in Wheelchair: Yes  Disoriented: No  Psychiatric Symptoms: None  History of Wandering: No  Elopement this Admit: No  Vocalizing Wanting to Leave: No  Displays Behaviors, Body Language Wanting to Leave: No-Not at Risk for Elopement  Elopement Risk: Not at Risk for Elopement    Interdisciplinary Discharge Planning  Does Admitting Nurse Feel This Could be a Complex Discharge?: No  Primary Care Physician: In [place]  Lives with - Patient's Self Care Capacity: Adult Children  Patient or legal guardian wants to designate a caregiver (see row info): No  Support Systems: Children, Family Member(s), Friends / Neighbors  Housing / Facility: 1 Story House  Do You Take your Prescribed Medications Regularly: Yes  Able to Return to Previous ADL's: Future Time w/Therapy  Mobility  Issues: No  Prior Services: None  Patient Expects to be Discharged to:: Acute Rehab  Assistance Needed: Yes  Durable Medical Equipment: Not Applicable    Discharge Preparedness  What is your plan after discharge?: Other (comment)([facility])  What are your discharge supports?: Child  Prior Functional Level: Ambulatory, Independent with Activities of Daily Living, Independent with Medication Management  Difficulity with ADLs: None  Difficulity with IADLs: Driving  Difficulity with IADL Comments: Does not have a license.    Functional Assesment  Prior Functional Level: Ambulatory, Independent with Activities of Daily Living, Independent with Medication Management    Finances  Financial Barriers to Discharge: No  Prescription Coverage: Yes    Vision / Hearing Impairment  Vision Impairment : No  Right Eye Vision: Impaired, Wears Glasses  Left Eye Vision: Impaired, Wears Glasses  Hearing Impairment : No    Advance Directive  Advance Directive?: None  Advance Directive offered?: AD Booklet refused    Domestic Abuse  Have you ever been the victim of abuse or violence?: No  Physical Abuse or Sexual Abuse: No  Verbal Abuse or Emotional Abuse: No  Possible Abuse Reported to:: Not Applicable    Psychological Assessment  History of Substance Abuse: None  History of Psychiatric Problems: No  Non-compliant with Treatment: No  Newly Diagnosed Illness: No    Discharge Risks or Barriers  Discharge risks or barriers?: No PCP  Patient risk factors: No PCP    Anticipated Discharge Information  Anticipated discharge disposition: Acute rehab  Discharge Address: [address] [place] [address] [place], [id number]  Discharge Contact Phone Number: [phone]

## 2019-08-21 NOTE — CONSULTS
Medical chart review completed.     Patient is a 69 y.o. [place] speaking female  with a past medical history of hypertension, diabetes, admitted to [facility] on 8/18, with dizziness, nausea/emesis.  Head CT negative. MRI with right paramedian dorsal midbrain and right thalamic strokes. HgbA1c 9.8, , ECHO EF 70 %.     Patient with multiple co-morbidities(including but not limited to diabetes with hyperglycemia, hypertension); with cognitive deficits and functional deficits in mobility/self-cares, and Moderate de-conditioning.     Pre-morbidly, this patient lived in a single level home with Two  steps to enter, with her son and adult granddaughter who can assist 24/7. The patient was evaluated by acute care Physical Therapy and Occupational Therapy; currently requiring contact-guard assistance for mobility and minimal assistance for ADLs.     6 clicks score 18 mobility, 19 ADLs    The patient is an excellent candidate for an acute inpatient rehabilitation program with a coordinated program of care at an intensity and frequency not available at a lower level of care.     Note: if the patient continues to progress while waiting for medical clearance, and no longer requires 2 out of 3 therapy services (PT/OT/SLP), then the patient would no longer meet the criteria for acute inpatient rehabilitation.    This recommendation is substantiated by the patient's current medical condition with intervention and assessment of medical issues requiring an acute level of care for patient's safety and maximum outcome. A coordinated program of care will be provided by an interdisciplinary team including physical therapy, occupational therapy, speech language pathology, hospitalist, physiatry, rehab nursing and rehab psychology. Rehab goals include improved cognition, mobility, self-care management, strength and conditioning/endurance, pain management, bowel and bladder management, mood and affect, and safety with  independent home management including caregiver training. Estimated length of stay is approximately 7-10 days. Rehab potential: Excellent. Disposition: to pre-morbid independent living setting with supportive care of patient's family. We will continue to follow with you in anticipation of discharge to acute inpatient rehabilitation when medically stable to do so at the discretion of her attending physician.     Thank you for allowing us to participate in her care.    Tereza Henderson M.D.  Physical Medicine and Rehabilitation

## 2019-08-21 NOTE — CARE PLAN
Problem: Communication  Goal: The ability to communicate needs accurately and effectively will improve  Note:   Pt is Serbian speaking only. understands some basic english. Family is at bedside at all times.      Problem: Safety  Goal: Will remain free from injury  Note:   Oriented pt to new environment. Reinforced safety precautions to pt, educated use of call light when assistance is needed, pt has family at bedside, wearing non skid socks, call light is within easy reach.

## 2019-08-21 NOTE — PROGRESS NOTES
[facility] Medicine Daily Progress Note    Date of Service  8/20/2019    Chief Complaint  69 y.o. female admitted 8/18/2019 with past medical history of hypertension and type 2 diabetes mellitus who presented 8/18/2019 with severe dizziness     Hospital Course    Upon arrival to the emergency room her blood pressure was 149/85, pulse 87 EKG found sinus rhythm with nonspecific ST segment changes.  CT scan of the head found no evidence of hemorrhage or infarct.  Troponins were slightly elevated at 22.  CTA of the head and neck found no large vessel occlusion.  Patient was started on aspirin and statins      Interval Problem Update  8/20: MRI of the brain found evidence of a lacunar infarct in the dorsal midbrain and thalamus.  Neurology was consulted.  Patient was placed on permissive hypertension which was discontinued today.    Consultants/Specialty  Neurology    Code Status  Full    Disposition  SNF versus rehab    Review of Systems  Review of Systems   Constitutional: Negative for chills, diaphoresis, fever and malaise/fatigue.   HENT: Negative for congestion, ear discharge, ear pain, hearing loss, nosebleeds, sinus pain, sore throat and tinnitus.    Eyes: Negative for blurred vision, double vision, photophobia and pain.   Respiratory: Negative for cough, hemoptysis, sputum production, shortness of breath, wheezing and stridor.    Cardiovascular: Negative for chest pain, palpitations, orthopnea, claudication, leg swelling and PND.   Gastrointestinal: Positive for nausea and vomiting. Negative for abdominal pain, blood in stool, constipation, diarrhea, heartburn and melena.   Genitourinary: Negative for dysuria, flank pain, frequency, hematuria and urgency.   Musculoskeletal: Negative for back pain, falls, joint pain, myalgias and neck pain.   Skin: Negative for itching and rash.   Neurological: Positive for dizziness and headaches. Negative for tingling, tremors and weakness.   Endo/Heme/Allergies: Negative for  environmental allergies and polydipsia. Does not bruise/bleed easily.   Psychiatric/Behavioral: Negative for depression, hallucinations, substance abuse and suicidal ideas.        Physical Exam  Temp:  [36 °C (96.8 °F)-36.8 °C (98.3 °F)] 36.8 °C (98.3 °F)  Pulse:  [73-97] 85  Resp:  [16-18] 16  BP: (137-166)/(71-88) 148/81  SpO2:  [90 %-94 %] 90 %    Physical Exam   Constitutional: She is oriented to person, place, and time. She appears well-developed and well-nourished.   HENT:   Head: Normocephalic and atraumatic.   Mouth/Throat: No oropharyngeal exudate.   Eyes: Pupils are equal, round, and reactive to light. EOM are normal. No scleral icterus.   Neck: Normal range of motion. Neck supple. No JVD present. No tracheal deviation present. No thyromegaly present.   Cardiovascular: Normal rate, regular rhythm and intact distal pulses. Exam reveals no gallop and no friction rub.   No murmur heard.  Pulmonary/Chest: Effort normal and breath sounds normal. No stridor. No respiratory distress. She has no wheezes. She has no rales. She exhibits no tenderness.   Abdominal: Soft. Bowel sounds are normal. She exhibits no distension and no mass. There is no tenderness. There is no rebound and no guarding.   Musculoskeletal: Normal range of motion. She exhibits no edema.   Lymphadenopathy:     She has no cervical adenopathy.   Neurological: She is alert and oriented to person, place, and time. She has normal reflexes. No cranial nerve deficit.   Nystagmus present  No ataxia noted  5 out of 5 strength in all extremity  No sensory changes   Skin: No rash noted. No erythema. No pallor.   Nursing note and vitals reviewed.      Fluids    Intake/Output Summary (Last 24 hours) at 8/20/2019 2014  Last data filed at 8/20/2019 1600  Gross per 24 hour   Intake 540 ml   Output --   Net 540 ml       Laboratory  Recent Labs     08/18/19 1928 08/20/19  0024   WBC 10.4 9.2   RBC 4.41 4.05*   HEMOGLOBIN 13.8 12.3   HEMATOCRIT 41.3 39.2   MCV  93.7 96.8   MCH 31.3 30.4   MCHC 33.4* 31.4*   RDW 50.4* 54.4*   PLATELETCT 172 148*   MPV 10.4 10.7     Recent Labs     08/18/19  1928 08/20/19  0024   SODIUM 138 137   POTASSIUM 3.8 4.2   CHLORIDE 101 108   CO2 25 22   GLUCOSE 201* 269*   BUN 18 22   CREATININE 0.60 0.78   CALCIUM 9.4 8.4*             Recent Labs     08/20/19  0024   TRIGLYCERIDE 80   HDL 46   *       Imaging  EC-ECHOCARDIOGRAM COMPLETE W/O CONT   Final Result      MR-BRAIN-W/O   Final Result      1.  Acute lacunar infarcts in the right paramedian dorsal midbrain and right thalamus.   2.  Mild chronic microvascular ischemic disease.   3.  Mild cerebral volume loss.      CT-CTA HEAD WITH & W/O-POST PROCESS   Final Result      1.  No large vessel occlusion, high-grade stenosis or aneurysm of the [place].   2.  No acute infarct, hemorrhage or mass.      CT-CTA NECK WITH & W/O-POST PROCESSING   Final Result      No high-grade stenosis, large vessel occlusion, aneurysm or dissection.      CT-HEAD W/O   Final Result      No CT evidence of acute infarct, hemorrhage or mass.      DX-CHEST-PORTABLE (1 VIEW)   Final Result      1.  There is no acute cardiopulmonary process. The cardiac silhouette is upper limits of normal in size.           Assessment/Plan  * Stroke, lacunar (HCC)- (present on admission)  Assessment & Plan  Severe and sudden onset vertigo concerning for posterior circulation stroke  Patient is past the tPA window  CT of the head found no large vessel occlusion and not a candidate for neuro intervention  Patient will be placed on continuous cardiac monitoring to evaluate for any arrhythmias  Q4 hours neuro checks  MRI of the brain found an lacunar stroke  Check 2-D echo found no evidence of emboli  Patient will be started on full dose aspirin and high intensity statin  Presence of hypertension discontinued by neurology  Check TSH, lipid panel and hemoglobin A1c  PTOT and ST evaluation  Meclizine as needed      Elevated  troponin- (present on admission)  Assessment & Plan  Denied active chest pain but did report shortness of breath, rule out ACS  Continuous cardiac monitoring with serial EKG and troponin  2D echo found no segmental hypokinesis  Patient has been given full dose of aspirin        Essential hypertension- (present on admission)  Assessment & Plan  Restarted blood pressure medications    Type 2 diabetes mellitus with hyperglycemia, without long-term current use of insulin (HCC)- (present on admission)  Assessment & Plan  Uncontrolled with hyperglycemia  Start on insulin sliding scale with serial Accu-Checks  hemoglobin A1c 9.8  Hypoglycemic protocol in place  Started patient on Januvia         VTE prophylaxis: lovenox    Patient is critically ill with acute ischemic stroke  The vital organ system that is affected is neurological  If untreated there is a high chance of deterioration into neurological and cardiovascular arrest  And eventually death.   The critical care that I am providing today is management of stroke protocol and neuro consult  The critical that has been undertaken is medically complex.   There has been no overlap in critical care time.   Critical Care Time not including procedures: 40 minutes

## 2019-08-21 NOTE — PROGRESS NOTES
Bedside report received. Pt on RA, A&Ox4. Bed locked and in low position. Family at bedside. Call light within reach, pt belongings within reach.

## 2019-08-21 NOTE — PROGRESS NOTES
Patient admitted to facility at 1100 via wheelchair; accompanied by hospital transport.  Patient assisted to room and positioned in bed for comfort and safety; call light within reach.  Patient assisted with stowing belongings and oriented to room and facility.  Family is at bedside. Admission assessment performed and documented in computer.  Admission paperwork completed; signed copies placed in chart.  Will continue to monitor.

## 2019-08-21 NOTE — DISCHARGE PLANNING
Dr. Henderson has accepted Noemi.  Dr. Ramsey has medically cleared.  Spoke with Ann (D.I.L.) regarding RIRF and financial obligations-she is agreeable.  Transport has been arranged for 1030.  Dr. Ramsey, Ann (D.I.L.) & Aylin () are aware.

## 2019-08-21 NOTE — CARE PLAN
Problem: Safety  Goal: Will remain free from falls  Outcome: PROGRESSING AS EXPECTED    Pt educated on use of call light. Appropriate assistive devices used. Appropriately assessed for risk of falls.

## 2019-08-21 NOTE — CARE PLAN
Problem: Communication  Goal: The ability to communicate needs accurately and effectively will improve  Outcome: PROGRESSING AS EXPECTED     Pt educated and updated on plan of care and procedures. Pt verbalizes understanding. Will continue to communicate effectively with trans later when needed.

## 2019-08-21 NOTE — PREADMISSION SCREENING NOTE
Pre-Admission Screening Form    Patient Information:   Name: Noemi Lopez     MRN: [id number]       : 1950      Age: 69 y.o.   Gender: female      Race: White [7]       Marital Status:  [5]  Family Contact: Ann Jesus Heriberto        Relationship: Daughter-in-law [28]  Son [15]  Home Phone: [phone]           Cell Phone:     Advanced Directives: None  Code Status:  FULL  Current Attending Provider: Danny Ramsey M.D.  Referring Physician: Dr. Ramsey   Physiatrist Consult: Dr. Henderson    Referral Date: 19  Primary Payor Source:  MEDICARE  Secondary Payor Source:      Medical Information:   Date of Admission to [facility]  Room Number: T811/02  Rehabilitation Diagnosis: 0.1.1 (L) Body Involvement (R) Brain    There is no immunization history on file for this patient.  Allergies   Allergen Reactions   • Pcn [Penicillins]    • Thiamine      Past Medical History:   Diagnosis Date   • Diabetes (HCC)    • Hypertension      History reviewed. No pertinent surgical history.    History Leading to Admission, Conditions that Caused the Need for Rehab (CMS):     Dr. Ramsey H&P:  Chief Complaint  Dizziness     History of Presenting Illness  69 y.o. female with a past medical history of hypertension and type 2 diabetes mellitus who presented 2019 with severe dizziness that started yesterday.  History is limited at the time of my interview as the patient had received IV Ativan and Antivert prior to my assessment.  Apparently the patient reported constant and severe dizziness that started yesterday evening, which she described as the room spinning around her head associated with nausea and vomiting.  Her symptoms are worsened with moving her head or changing positions.  She also reported shortness of breath.  She denied any headache, fevers, chest pain or focal weakness.  There is no reported history of stroke or MI.  In the ER she had persistent  nausea with multiple episodes of vomiting for which she was given Zofran, IV Ativan and meclizine with improvement of her symptoms.     EKG interpreted by me reveals sinus rhythm with low voltage.  No ST elevation or ST depression noted  Chest x-ray interpreted by me reveals no acute cardiopulmonary process     Assessment/Plan:  I anticipate this patient is appropriate for observation status at this time.     Vertigo- (present on admission)  Assessment & Plan  Severe and sudden onset vertigo concerning for posterior circulation stroke  Patient is past the tPA window  Will order stat CTA head and neck with IV contrast to evaluate for large vessel occlusion  Patient will be placed on continuous cardiac monitoring to evaluate for any arrhythmias  Q4 hours neuro checks  I will order MRI brain  Check 2-D echo  Patient will be started on full dose aspirin and high intensity statin  Allow permissive hypertension  Check TSH, lipid panel and hemoglobin A1c  PTOT and ST evaluation  Meclizine as needed        Elevated troponin- (present on admission)  Assessment & Plan  Denied active chest pain but did report shortness of breath, rule out ACS  Continuous cardiac monitoring with serial EKG and troponin  Check 2D echo  Patient has been given full dose of aspirin  Check lipid panel, TSH and hemoglobin A1c           Essential hypertension- (present on admission)  Assessment & Plan  Allow permissive hypertension at this time  IV hydralazine and labetalol as needed for SBP greater than 220 or DBP greater than 120     Type 2 diabetes mellitus with hyperglycemia, without long-term current use of insulin (HCC)- (present on admission)  Assessment & Plan  Uncontrolled with hyperglycemia  Start on insulin sliding scale with serial Accu-Checks  Check hemoglobin A1c  Hypoglycemic protocol in place              VTE prophylaxis: Lovenox              Dr. Bloch (Neurology) recommendations:  Assessment/Plan:     Patient was observed to have CVA  on MRI scan after being admitted for acute onset vertigo with nausea and vomiting.  There is no previous history of stroke.  I reviewed the MRI myself which showed lacunar type strokes in the right paramedian rajan and right thalamus.  Patient did not receive TPA as she did not present to the emergency room until 19:15 yesterday.     CVA is small vessel thrombotic etiology with history of hypertension and diabetes.  CT CTA reviewed by myself did not show any large vessel occlusion or high-grade stenosis.  I reviewed the MRI scan myself which did show lacunar CVA as the cause of her vertigo symptoms.     Echocardiogram is unremarkable for cardioembolic source.     Patient received aspirin 325 mg and will continue daily.     Permissive hypertension as currently ordered.     Atorvastatin 80 mg     PT/OT/speech therapy ordered     Meclizine and ondansetron for vertigo and nausea ordered.     Diabetic control per hospitalist team.     Neurology team will continue to assess how patient is progressing.    Brain MRI 08-19-19:  1.  Acute lacunar infarcts in the right paramedian dorsal midbrain and right thalamus.  2.  Mild chronic microvascular ischemic disease.  3.  Mild cerebral volume loss.    Co-morbidities: See PMH  Potential Risk - Complications: Aphasia, Cognitive Impairment, Contractures, Deep Vein Thrombosis, Dysphagia, Incontinence, Malnutrition, Pain, Perceptual Impairment, Pneumonia, Pressure Ulcer and Urinary Tract Infection  Level of Risk: High    Ongoing Medical Management Needed (Medical/Nursing Needs):   Patient Active Problem List    Diagnosis Date Noted   • Stroke, lacunar (HCC) 08/19/2019     Priority: High   • Elevated troponin 08/19/2019     Priority: Medium   • Type 2 diabetes mellitus with hyperglycemia, without long-term current use of insulin (HCC) 08/19/2019   • Essential hypertension 08/19/2019     A & O    Current Vital Signs:   Temperature: 36.8 °C (98.3 °F) Pulse: 80 Respiration: 16 Blood  Pressure : 160/81  Weight: 66.9 kg (147 lb 7.8 oz) Height: 152.4 cm (5')  Pulse Oximetry: 95 % O2 (LPM): 0      Completed Laboratory Reports:  Recent Labs     08/18/19  1907 08/18/19  1928 08/19/19  0314 08/19/19  0551 08/19/19  1034 08/19/19  1733 08/20/19  0020 08/20/19  0024 08/20/19  0510 08/20/19  1132 08/20/19  1750 08/20/19  2111 08/21/19  0801   WBC  --  10.4  --   --   --   --   --  9.2  --   --   --   --   --    HEMOGLOBIN  --  13.8  --   --   --   --   --  12.3  --   --   --   --   --    HEMATOCRIT  --  41.3  --   --   --   --   --  39.2  --   --   --   --   --    PLATELETCT  --  172  --   --   --   --   --  148*  --   --   --   --   --    SODIUM  --  138  --   --   --   --   --  137  --   --   --   --   --    POTASSIUM  --  3.8  --   --   --   --   --  4.2  --   --   --   --   --    BUN  --  18  --   --   --   --   --  22  --   --   --   --   --    CREATININE  --  0.60  --   --   --   --   --  0.78  --   --   --   --   --    ALBUMIN  --  3.9  --   --   --   --   --   --   --   --   --   --   --    GLUCOSE  --  201*  --   --   --   --   --  269*  --   --   --   --   --    POCGLUCOSE 190*  --  119* 123* 105* 245* 241*  --  161* 247* 230* 306* 152*     Additional Labs: Not Applicable    Prior Living Situation:   Housing / Facility: 1 Story House  Steps Into Home: 2  Lives with - Patient's Self Care Capacity: Adult Children, Other (Comments)(Granddaughter)  Equipment Owned: None    Prior Level of Function / Living Situation:   Physical Therapy: Prior Services: None  Housing / Facility: [address]  Steps Into Home: 2  Bathroom Set up: Walk In Shower, Bathtub / Shower Combination  Equipment Owned: None  Lives with - Patient's Self Care Capacity: Adult Children, Other (Comments)(Granddaughter)  Bed Mobility: Independent  Transfer Status: Independent  Ambulation: Independent  Distance Ambulation (Feet): 1000  Assistive Devices Used: None  Stairs: Independent  Current Level of Function:   Level Of Assist:  Contact Guard Assist  Assistive Device: Front Wheel Walker  Distance (Feet): 25  Weight Bearing Status: no restrictions  Supine to Sit: Supervised  Sit to Supine: Supervised  Scooting: Supervised  Rolling: Supervised  Sit to Stand: Stand by Assist  Bed, Chair, Wheelchair Transfer: Contact Guard Assist  Toilet Transfers: Minimal Assist  Transfer Method: Stand Pivot  Sitting in Chair: NT  Sitting Edge of Bed: 5 min  Standin-7 min  Occupational Therapy:   Self Feeding: Independent  Grooming / Hygiene: Independent  Bathing: Independent  Dressing: Independent  Toileting: Independent  Laundry: Independent  Kitchen Mobility: Independent  Home Management: Independent  Shopping: Independent  Prior Level Of Mobility: Independent Without Device in Community  Prior Services: None  Housing / Facility: [address]  Current Level of Function:   Lower Body Dressing: Minimal Assist  Toileting: Minimal Assist  Speech Language Pathology:      Rehabilitation Prognosis/Potential: Good  Estimated Length of Stay: 10-14 days    Nursing:   Orientation : Oriented x 4  Continent    Scope/Intensity of Services Recommended:  Physical Therapy: 1.5 hr / day  5 days / week. Therapeutic Interventions Required: Maximize Endurance, Mobility, Strength and Safety  Occupational Therapy: 1.5 hr / day 5 days / week. Therapeutic Interventions Required: Maximize Self Care, ADLs, IADLs and Energy Conservation  Speech & Language Pathology: Consult 5 days / week. Therapeutic Interventions Required: Maximize Cognition, Swallowing and Safety  Rehabilitation Nursin/7. Therapeutic Interventions Required: Monitor Pain, Skin, Vital Signs, Intake and Output, Labs, Safety, Aspiration Risk and Family Training  Rehabilitation Physician: 3 - 5 days / week. Therapeutic Interventions Required: Medical Management    Rehabilitation Goals and Plan (Expected frequency & duration of treatment in the IRF):   Return to the Community, Modified Independent Level of Care  and Family Able to Provide 24/7 Assistance  Anticipated Date of Rehabilitation Admission: 08-21-19  Patient/Family oriented IRF level of care/facility/plan: Yes  Patient/Family willing to participate in IRF care/facility/plan: Yes  Patient able to tolerate IRF level of care proposed: Yes  Patient has potential to benefit IRF level of care proposed: Yes  Comments: Not Applicable    Special Needs or Precautions - Medical Necessity:  Safety Concerns/Precautions:  Fall Risk / High Risk for Falls, Balance, Cognition and Bed / Chair Alarm  Pain Management  Language Preference other than English: Icelandic   Needed  IV Site: Peripheral  Current Medications:    Current Facility-Administered Medications Ordered in Epic   Medication Dose Route Frequency Provider Last Rate Last Dose   • insulin regular (HUMULIN R) injection 2-9 Units  2-9 Units Subcutaneous 4X/DAY JAMARI Ramsey M.D.   2 Units at 08/21/19 0805    And   • glucose 4 g chewable tablet 16 g  16 g Oral Q15 MIN PRN Danny Ramsey M.D.        And   • DEXTROSE 10% BOLUS 250 mL  250 mL Intravenous Q15 MIN PRN Danny Ramsey M.D.       • SITagliptin (JANUVIA) tablet 25 mg  25 mg Oral DAILY Danny Ramsey M.D.   25 mg at 08/21/19 0508   • enalapril (VASOTEC) tablet 10 mg  10 mg Oral DAILY Danny Ramsey M.D.   10 mg at 08/21/19 0508   • LORazepam (ATIVAN) tablet 0.5 mg  0.5 mg Oral Q8HRS PRN Danny Ramsey M.D.       • meclizine (ANTIVERT) tablet 25 mg  25 mg Oral Q6HRS PRN Danny Ramsey M.D.   25 mg at 08/20/19 2114   • senna-docusate (PERICOLACE or SENOKOT S) 8.6-50 MG per tablet 2 Tab  2 Tab Oral BID Karan Ramsey M.D.   Stopped at 08/19/19 1800    And   • polyethylene glycol/lytes (MIRALAX) PACKET 1 Packet  1 Packet Oral QDAY PRN Karan Ramsey M.D.        And   • magnesium hydroxide (MILK OF MAGNESIA) suspension 30 mL  30 mL Oral QDAY PRN Karan Ramsey M.D.        And   • bisacodyl (DULCOLAX) suppository 10 mg  10 mg Rectal QDAY PRN Karan Ramsey M.D.       •  Respiratory Care per Protocol   Nebulization Continuous RT Karan Ramsey M.D.       • NS infusion   Intravenous Continuous Karan Ramsey M.D. 100 mL/hr at 08/21/19 0510 1,000 mL at 08/21/19 0510   • enoxaparin (LOVENOX) inj 40 mg  40 mg Subcutaneous DAILY Karan Ramsey M.D.   40 mg at 08/21/19 0510   • acetaminophen (TYLENOL) tablet 650 mg  650 mg Oral Q6HRS PRN Karan Ramsey M.D.       • ondansetron (ZOFRAN) syringe/vial injection 4 mg  4 mg Intravenous Q4HRS PRN Karan Ramsey M.D.       • ondansetron (ZOFRAN ODT) dispertab 4 mg  4 mg Oral Q4HRS PRN Karan Ramsey M.D.       • aspirin (ASA) tablet 325 mg  325 mg Oral DAILY Karan Ramsey M.D.   325 mg at 08/21/19 0507    Or   • aspirin (ASA) chewable tab 324 mg  324 mg Oral DAILY Karan Ramsey M.D.        Or   • aspirin (ASA) suppository 300 mg  300 mg Rectal DAILY Karan Ramsey M.D.       • atorvastatin (LIPITOR) tablet 80 mg  80 mg Oral Q EVENING Karan Ramsey M.D.   80 mg at 08/20/19 1754   • labetalol (NORMODYNE,TRANDATE) injection 10 mg  10 mg Intravenous Q4HRS PRN Karan Ramsey M.D.        Or   • hydrALAZINE (APRESOLINE) injection 10 mg  10 mg Intravenous Q2HRS PRN Karan Ramsey M.D.   10 mg at 08/21/19 0809   • DEXTROSE 10% BOLUS 250 mL  250 mL Intravenous Q15 MIN PRN Karan Ramsey M.D.         No current [facility]-ordered outpatient medications on file.     Diet:   DIET ORDERS (From admission to next 24h)     Start     Ordered    08/20/19 0419  Diet Order Cardiac, Diabetic  ALL MEALS     Question Answer Comment   Diet: Cardiac    Diet: Diabetic    Miscellaneous modifications: No Decaf, No Caffeine(for test)        08/20/19 0418                Anticipated Discharge Destination / Patient/Family Goal:  Destination: Home with Assistance Support System: Family   Anticipated home health services: OT and PT  Previously used  service/ provider: Not Applicable  Anticipated DME Needs: [employer]  Outpatient Services: OT and PT  Alternative resources to address additional  identified needs:     Pre-Screen Completed: 8/21/2019 8:30 AM Cruzito Omalley L.P.N.

## 2019-08-21 NOTE — FLOWSHEET NOTE
08/21/19 1156   Type of Assessment   Assessment Yes   Patient History   Pulmonary Diagnosis no   Surgical Procedures none   Home O2 No   Home Treatments/Frequency No   COPD Risk Screening   Do you have a history of COPD? No   Smoking History   Have you ever smoked Never   Level Of Consciousness   Level of Consciousness Alert   Respiratory WDL   Respiratory (WDL) X   Chest Exam   Respiration 18   Pulse 100   Oximetry   #Pulse Oximetry (Single Determination) Yes   Oxygen   Home O2 Use Prior To Admission? No   Pulse Oximetry 96 %   O2 Daily Delivery Respiratory  Room Air with O2 Available

## 2019-08-21 NOTE — PROGRESS NOTES
Patient being discharged. Patient educated on discharge instructions and new prescriptions. Patient verbalized understanding. Follow up appt to be made by pt. PIV removed, telemetry monitor checked in. Patient going to [facility]. RN to call transport to escort out. Will monitor until patient is off unit. Pt and family educated and given stroke education booklet specifically regarding pt's risk factors including HTN and DM.

## 2019-08-21 NOTE — H&P
REHABILITATION HISTORY AND PHYSICAL/POST ADMISSION EVALUATION    8/21/2019  2:41 PM  Noemi Lopez  [id number]  Admission  8/21/2019 10:59 AM  [facility] Code/Reason for admission: 0.1.1 (L) Body Involvement (R) Brain   Etiologic diagnosis/problem: Stroke, lacunar (HCC)  Chief Complaint: dizziness    HPI:  Patient is a 69 y.o. [place] speaking female  with a past medical history of hypertension, diabetes, admitted to [facility] on 8/18, with dizziness, nausea/emesis.  Head CT negative. MRI with right paramedian dorsal midbrain and right thalamic strokes. HgbA1c 9.8, , ECHO EF 70 %.     Using the , patient currently reports dizziness and nausea when she is her head and moves.  She also reports double vision.  She denies any focal weakness but reports generalized weakness.  She is right-hand dominant.  She does wear glasses.  Denies any pain or paresthesias.  Multiple family members are present.  They are asking if someone can stay with her at night for the patient's comfort.  She reports she does not have a glucometer at home.    Patient was evaluated by Rehab Medicine physician and Physical Therapy and Occupational Therapy and determined to be appropriate for acute inpatient rehab and was transferred to [facility] on 8/21/2019.     With this acute therapeutic intervention, this patient hopes to improve her functional status, and return to independent living with the supportive care of family.    REVIEW OF SYSTEMS:     A complete review of systems was performed and was negative in detail with the exception of items mentioned elsewhere in this document.    PMH:  Past Medical History:   Diagnosis Date   • Diabetes (HCC)    • Hypertension        PSH:  History reviewed. No pertinent surgical history.   Denies any surgery.    Family History   Problem Relation Age of Onset   • Diabetes Child       No strokes in mother/father.    MEDICATIONS:  Current Facility-Administered  Medications   Medication Dose   • Respiratory Care per Protocol     • Pharmacy Consult Request ...Pain Management Review 1 Each  1 Each   • acetaminophen (TYLENOL) tablet 650 mg  650 mg   • hydrALAZINE (APRESOLINE) tablet 10 mg  10 mg   • artificial tears ophthalmic solution 1 Drop  1 Drop   • benzocaine-menthol (CEPACOL) lozenge 1 Lozenge  1 Lozenge   • mag hydrox-al hydrox-simeth (MAALOX PLUS ES or MYLANTA DS) suspension 20 mL  20 mL   • ondansetron (ZOFRAN ODT) dispertab 4 mg  4 mg    Or   • ondansetron (ZOFRAN) syringe/vial injection 4 mg  4 mg   • traZODone (DESYREL) tablet 50 mg  50 mg   • sodium chloride (OCEAN) 0.65 % nasal spray 2 Spray  2 Spray   • hydrOXYzine HCl (ATARAX) tablet 50 mg  50 mg   • melatonin tablet 3 mg  3 mg   • [START ON 8/22/2019] enoxaparin (LOVENOX) inj 40 mg  40 mg   • senna-docusate (PERICOLACE or SENOKOT S) 8.6-50 MG per tablet 2 Tab  2 Tab    And   • polyethylene glycol/lytes (MIRALAX) PACKET 1 Packet  1 Packet    And   • magnesium hydroxide (MILK OF MAGNESIA) suspension 30 mL  30 mL    And   • bisacodyl (DULCOLAX) suppository 10 mg  10 mg   • insulin regular (HUMULIN R) injection 2-9 Units  2-9 Units    And   • glucose 4 g chewable tablet 16 g  16 g    And   • dextrose 50% (D50W) injection 50 mL  50 mL   • atorvastatin (LIPITOR) tablet 80 mg  80 mg   • [START ON 8/22/2019] aspirin EC (ECOTRIN) tablet 81 mg  81 mg   • meclizine (ANTIVERT) tablet 25 mg  25 mg   • [START ON 8/22/2019] SITagliptin (JANUVIA) tablet 25 mg  25 mg       ALLERGIES:  Pcn [penicillins] and Thiamine    PSYCHOSOCIAL HISTORY:  Pre-morbidly, this patient lived in a single level home with Two steps to enter, with her son and adult granddaughter who can assist 24/7. Plan to return to [place] in one month.  She has 14 children.  Went up to the sixth grade in [place].  Was not driving prior to admission.  Denies any alcohol or tobacco.    LEVEL OF FUNCTION PRIOR TO DISABILTY:  Did not use AD , did not drive    LEVEL OF  FUNCTION PRIOR TO ADMISSION to [facility]:  The patient was evaluated by acute care Physical Therapy and Occupational Therapy; currently requiring contact-guard assistance for mobility and minimal assistance for ADLs.     CURRENT LEVEL OF FUNCTION:   Same as level of function prior to admission to [facility]    PHYSICAL EXAM:     VITAL SIGNS:   height is 1.524 m (5') and weight is 68 kg (149 lb 14.6 oz). Her oral temperature is 36.6 °C (97.8 °F). Her blood pressure is 136/72 and her pulse is 100. Her respiration is 18 and oxygen saturation is 96%.     GENERAL: No apparent distress  HEENT: Normocephalic/atraumatic, EOMI and PERRL, right horizontal and vertical nystagmus  CARDIAC: Regular rate and rhythm, normal S1, S2, no murmurs, no peripheral edema   LUNGS: Clear to auscultation, normal respiratory effort, on room air   ABDOMINAL: bowel sounds present, soft, nontender and nondistended    EXTREMITIES: no spasticity, no edema or no calf tenderness bilaterally  MSK: No joint swelling    NEURO:    Mental status: alert, not oriented to having had a stroke  Speech: fluent, no aphasia or dysarthria    CRANIAL NERVES:  2,3: visual acuity grossly intact, PERRL  3,4,6: EOMI bilaterally, + diplopia at distance, right sided horizontal and vertical nystagmus  5: intact in all branches  7: mild decreased left nasolabial fold  8: hearing grossly intact  9,10: symmetric palate elevation  11: SCM/Trapezius strength 5/5 bilaterally  12: tongue protrudes midline    Motor:  Shoulder flexors:  Right -  5/5, Left -  5/5  Elbow flexors:  Right -  5/5, Left -  5/5  Elbow extensors:  Right -  5/5, Left -  5/5  Symmetrical   Hip flexors:  Right -  5/5, Left -  5/5  Knee ext:  Right -  5/5, Left -  5/5  Dorsiflexors:  Right -  5/5, Left -  5/5  EHL:  Right -  5/5, Left -  5/5  Plantar flexors:  Right -  5/5, Left -  5/5     Sensory:   intact to light touch through out    DTRs: 1+ in bilateral  biceps, triceps, brachioradialis, 1+ in bilateral patellar and ) at bilateral achilles tendons  No clonus at bilateral ankles  Negative babinski b/l  Negative Mauro b/l     RADIOLOGY:              Results for orders placed during the hospital encounter of 08/18/19   MR-BRAIN-W/O    Impression 1.  Acute lacunar infarcts in the right paramedian dorsal midbrain and right thalamus.  2.  Mild chronic microvascular ischemic disease.  3.  Mild cerebral volume loss.                                                                                               Results for orders placed during the hospital encounter of 08/18/19   CT-CTA NECK WITH & W/O-POST PROCESSING    Impression No high-grade stenosis, large vessel occlusion, aneurysm or dissection.                             LABS:  Recent Labs     08/18/19 1928 08/20/19  0024   SODIUM 138 137   POTASSIUM 3.8 4.2   CHLORIDE 101 108   CO2 25 22   GLUCOSE 201* 269*   BUN 18 22   CREATININE 0.60 0.78   CALCIUM 9.4 8.4*     Recent Labs     08/18/19 1928 08/20/19  0024   WBC 10.4 9.2   RBC 4.41 4.05*   HEMOGLOBIN 13.8 12.3   HEMATOCRIT 41.3 39.2   MCV 93.7 96.8   MCH 31.3 30.4   MCHC 33.4* 31.4*   RDW 50.4* 54.4*   PLATELETCT 172 148*   MPV 10.4 10.7         PRIMARY REHAB DIAGNOSIS:    This patient is a 69 y.o. female admitted for acute inpatient rehabilitation with Stroke, lacunar (HCC).    IMPAIRMENTS:   Cognitive  ADLs/IADLs  Mobility    SECONDARY DIAGNOSIS/MEDICAL CO-MORBIDITIES AFFECTING FUNCTION:    Right paramedial midbrain/thalmic stroke  Dizziness  Hypertension  Diabetes    RELEVANT CHANGES SINCE PREADMISSION EVALUATION:    Status unchanged    The patient's rehabilitation potential is Excellent  The patient's medical prognosis is excellent    PLAN:   Discussion and Recommendations, discussed with the patient and/or family:   1. The patient requires an acute inpatient rehabilitation program with a coordinated program of care at an intensity and frequency not  available at a lower level of care. This recommendation is substantiated by the patient's medical physicians who recommend that the patient's intervention and assessment of medical issues needs to be done at an acute level of care for patient's safety and maximum outcome.     2. A coordinated program of care will be supplied by an interdisciplinary team of physical therapy, occupational therapy, rehab physician, rehab nursing, and, if needed, speech therapy and rehab psychology. Rehab team presents a patient-specific rehabilitation and education program concentrating on prevention of future problems related to accessibility, mobility, skin, bowel, bladder, sexuality, and psychosocial and medical/surgical problems.     3. Need for Rehabilitation Physician: The rehab physician will be evaluating the patient on a multi-weekly basis to help coordinate the program of care. The rehab physician communicates between medical physicians, therapists, and nurses to maximize the patient's potential outcome. Specific areas in which the rehab physician will be providing daily assessment include the following:   A. Assessing the patient's heart rate and blood pressure response (vitals monitoring) to activity and making adjustments in medications or conservative measures as needed.   B. The rehab physician will be assessing the frequency at which the program can be increased to allow the patient to reach optimal functional outcome.   C. The rehab physician will also provide assessments in daily skin care, especially in light of patient's impairments in mobility.   D. The rehab physician will provide special expertise in understanding how to work with functional impairment and recommend appropriate interventions, compensatory techniques, and education that will facilitate the patient's outcome.     4. Rehab R.N.   The rehab RN will be working with patient to carry over in room mobility and activities of daily living when the patient  is not in 3 hours of skilled therapy. Rehab nursing will be working in conjunction with rehab physician to address all the medical issues above and continue to assess laboratory work and discuss abnormalities with the treating physicians, assess vitals, and response to activity, and discuss and report abnormalities with the rehab physician. Rehab RN will also continue daily skin care, supervise bladder/bowel program, instruct in medication administration, and ensure patient safety.     5. Therapies to treat at intensity and frequency of (may change after completion of evaluation by all therapeutic disciplines):       PT:  Physical therapy to address mobility, transfer, gait training and evaluation for adaptive equipment needs 1hour/day at least 5 days/week for the duration of the ELOS (see below)       OT:  Occupational therapy to address ADLs, self-care, home management training, functional mobility/transfers and assistive device evaluation, and community re-integration 1hour/day at least 5 days/week for the duration of the ELOS (see below).        ST/Dysphagia:  Speech therapy to address speech, language, and cognitive deficits as well as swallowing difficulties with retraining/dysphagia management and community re-integration with comprehension, expression, cognitive training 1hour/day at least 5 days/week for the duration of the ELOS (see below).     6. Medical management / Rehabilitation Issues/Adverse Potential affecting function as part of rehabilitation plan.    Right paramedial midbrain/thalmic stroke  Non-dominant  Double vision  Nystagmus  Continue full rehab program  PT/OT/SLP, 1 hr each discipline, 5 days per week  Continue aspirin and statin for secondary stroke prophylaxis  Eye patch    Dizziness  Start scopolamine patch  PRN Zofran    Hypertension  Restart Enalapril  Appreciate hospitalist assistance    Diabetes  Continue Januvia   Continue SSI  Appreciate hospitalist assistance      I performed a  complete drug regimen review and did not identify any potential clinically significant medication issues.    The patient's CODE STATUS was confirmed as FULL CODE on admission, with the patient and/or family at bedside.    REHABILITATION ISSUES/ADVERSE POTENTIAL:  1.  CVA (Cerebrovascular Accident): Cont aspirin for secondary prophylaxis as well as lipid and blood pressure management. Patient demonstrates functional deficits in strength, balance, coordination, and ADL's. Patient is admitted to [facility] for comprehensive rehabilitation therapy as described below.   Rehabilitation nursing monitors bowel and bladder control, educates on medication administration, co-morbidities and monitors patient safety.    2.  Neurostimulants: None at this time but continue to assess daily for need to initiate should status change.    3.  DVT prophylaxis:  Patient is on Lovenox for anticoagulation upon transfer. Encourage OOB. Monitor daily for signs and symptoms of DVT including but not limited to swelling and pain to prevent the development of DVT that may interfere with therapies.    4.  Pain: No issues with pain currently / Controlled with as needed oral analgesics.    5.  Nutrition/Dysphagia: Dietician monitors nutrient intake, recommend supplements prn and provide nutrition education to pt/family to promote optimal nutrition for wound healing/recovery.     6.  Bladder/bowel:  Start bowel and bladder program as described below, to prevent constipation, urinary retention (which may lead to UTI), and urinary incontinence (which will impact upon pt's functional independence).   - TV Q3h while awake with post void bladder scans, I&O cath for PVRs >400  - up to commode after meal     7.  Skin/dermal ulcer prophylaxis: Monitor for new skin conditions with q.2 h. turns as required to prevent the development of skin breakdown.     8.  Cognition/Behavior:  Psychologist Dr. Skelton provides adjustment counseling to  illness and psychosocial barriers that may be potential barriers to rehabilitation.     9. Respiratory therapy: RT performs O2 management prn, breathing retraining, pulmonary hygiene and bronchospasm management prn to optimize participation in therapies.    Pt was seen today for 75 min, and entire time spent in face-to-face contact was >50% in counseling and coordination of care as detailed in A/P above.        GOALS/EXPECTED LEVEL OF FUNCTION BASED ON CURRENT MEDICAL AND FUNCTIONAL STATUS (may change based on patient's medical status and rate of impairment recovery):  Transfers:   Modified Independent  Mobility/Gait:   Modified Independent  ADL's:   Supervision  Cognition:  Supervision    DISPOSITION: Discharge to pre-morbid independent living setting with the supportive care of patient's family.      ELOS: 10-14 days    Tereza Henderson M.D.  Physical Medicine and Rehabilitation

## 2019-08-21 NOTE — PROGRESS NOTES
Bedside report received. Patient A&O x4. Complains of pain no pain. Pt continues to complain of dizziness, medicated per MAR. NIH stroke scale was done on pt. With . Pt. Continues to score a 0. Will continue to monitor.      POC discussed with patient. Patient verbalized understanding. Call light and belongings within reach. Bed locked and in lowest position, alarm and fall precautions in place.

## 2019-08-21 NOTE — DISCHARGE SUMMARY
"Discharge Summary    CHIEF COMPLAINT ON ADMISSION  Chief Complaint   Patient presents with   • Dizziness     \"room spinning\"   • N/V   • Shortness of Breath     started around 12       Reason for Admission  N/V, SOB, dizziness     Admission Date  8/18/2019    CODE STATUS  Full Code    HPI & HOSPITAL COURSE  This is a 69 y.o. female here with  past medical history of hypertension and type 2 diabetes mellitus who presented 8/18/2019 with severe dizziness   Upon arrival to the emergency room her blood pressure was 149/85, pulse 87 EKG found sinus rhythm with nonspecific ST segment changes.  CT scan of the head found no evidence of hemorrhage or infarct.  Troponins were slightly elevated at 22.  CTA of the head and neck found no large vessel occlusion.  Patient was started on aspirin and statins  Patient was passed the TPA window since her symptoms started to occur 24 hours prior.  MRI of the brain was found to have a lacunar infarct in the dorsal midbrain and thalamus.  Cardiac echo revealed no source of emboli.  Patient was allowed to have permissive hypertension.  She was evaluated by PT that determined she would need some sort of placement.  Before discharge she states that her dizziness was improving but she still sways more to the right.  Patient had uncontrolled hyperglycemia while in the hospital.  Her hemoglobin A1c was 9.8.  She was started on insulin sliding scale and Januvia.  She should continue her home metformin upon discharge.    Therefore, she is discharged in good and stable condition to an inpatient rehabilitation hospital.    The patient met 2-midnight criteria for an inpatient stay at the time of discharge.    Discharge Date  8/21/2019    FOLLOW UP ITEMS POST DISCHARGE  Follow-up further rehab recommendations  Follow blood glucose levels and adjust medications accordingly.  Hemoglobin A1c 9.8    DISCHARGE DIAGNOSES  Principal Problem:    Stroke, lacunar (HCC) POA: Yes  Active Problems:    Elevated " troponin POA: Yes    Type 2 diabetes mellitus with hyperglycemia, without long-term current use of insulin (HCC) POA: Yes    Essential hypertension POA: Yes  Resolved Problems:    * No resolved hospital problems. *      FOLLOW UP  No future appointments.  rGeg Arevalo M.D.  [address]  [place] [address]  [phone]    Schedule an appointment as soon as possible for a visit   As needed si no [address]    [facility] [address]  [address], [address]  [place] [address] [phone]  Schedule an appointment as soon as possible for a visit   para [place] [address] [facility] [address]      MEDICATIONS ON DISCHARGE     Medication List      START taking these medications      Instructions   acetaminophen 325 MG Tabs  Commonly known as:  TYLENOL   Take 2 Tabs by mouth every 6 hours as needed (Mild Pain; (Pain scale 1-3); Temp greater than 100.5 F).  Dose:  650 mg     aspirin 81 MG EC tablet   Take 1 Tab by mouth every day.  Dose:  81 mg     atorvastatin 80 MG tablet  Commonly known as:  LIPITOR   Take 1 Tab by mouth every evening.  Dose:  80 mg     SITagliptin 25 MG Tabs  Start taking on:  8/22/2019  Commonly known as:  JANUVIA   Take 1 Tab by mouth every day.  Dose:  25 mg        CONTINUE taking these medications      Instructions   enalapril 10 MG Tabs  Commonly known as:  VASOTEC   Take 10 mg by mouth every day.  Dose:  10 mg     glucosamine Sulfate 500 MG Caps   Take 500 mg by mouth every day.  Dose:  500 mg     LORazepam 0.5 MG Tabs  Commonly known as:  ATIVAN   Take 1 Tab by mouth every 8 hours as needed (vertigo) for up to 5 days.  Dose:  0.5 mg     meclizine 25 MG Tabs  Commonly known as:  ANTIVERT   Take 1 Tab by mouth every 6 hours as needed for Dizziness, Nausea/Vomiting or Vertigo.  Dose:  25 mg     metFORMIN 850 MG Tabs  Commonly known as:  GLUCOPHAGE   Take 850 mg by mouth 2 times a day, with meals.  Dose:  850 mg     Non Formulary Request   Take 5 mg by mouth 2 Times a Day.  Indications: glibenclamida 5mg tab daily  Dose:  5 mg     ondansetron 4 MG Tbdp  Commonly known as:  ZOFRAN ODT   Take 1 Tab by mouth every 8 hours as needed.  Dose:  4 mg            Allergies  Allergies   Allergen Reactions   • Pcn [Penicillins]    • Thiamine        DIET  Orders Placed This Encounter   Procedures   • Diet Order Cardiac, Diabetic     Standing Status:   Standing     Number of Occurrences:   1     Order Specific Question:   Diet:     Answer:   Cardiac [6]     Order Specific Question:   Diet:     Answer:   Diabetic [3]     Order Specific Question:   Miscellaneous modifications:     Answer:   No Decaf, No Caffeine(for test) [11]       ACTIVITY  As tolerated.  Weight bearing as tolerated    CONSULTATIONS  Neurology    PROCEDURES  None    LABORATORY  Lab Results   Component Value Date    SODIUM 137 08/20/2019    POTASSIUM 4.2 08/20/2019    CHLORIDE 108 08/20/2019    CO2 22 08/20/2019    GLUCOSE 269 (H) 08/20/2019    BUN 22 08/20/2019    CREATININE 0.78 08/20/2019        Lab Results   Component Value Date    WBC 9.2 08/20/2019    HEMOGLOBIN 12.3 08/20/2019    HEMATOCRIT 39.2 08/20/2019    PLATELETCT 148 (L) 08/20/2019        Total time of the discharge process exceeds 40 minutes.

## 2019-08-21 NOTE — DISCHARGE INSTRUCTIONS
More meclizine y/o lorazepam contra mareos y nausea.  Tambien puede alex zofran contra nausea.  Vaosito a Dr. Arevalo si no mejora 5 andrew.  Regresa para dolor de pecho, dolor de giovana, o otra sintoma neurologica.    You had an elevated blood pressure reading today at @BP (or a high normal/borderline high pressure).  This does not mean you have hypertension.  Please followup with a/your primary physician for comprehensive blood pressure evaluation.     149/85        Ischemic Stroke  An ischemic stroke (cerebrovascular accident, or CVA) is the sudden death of brain tissue that occurs when an area of the brain does not get enough oxygen. It is a medical emergency that must be treated right away. An ischemic stroke can cause permanent loss of brain function. This can cause problems with how different parts of your body function.  What are the causes?  This condition is caused by a decrease of oxygen supply to an area of the brain, which may be the result of:  · A small blood clot (embolus) or a buildup of plaque in the blood vessels (atherosclerosis) that blocks blood flow in the brain.  · An abnormal heart rhythm (atrial fibrillation).  · A blocked or damaged artery in the head or neck.  What increases the risk?  Certain factors may make you more likely to develop this condition. Some of these factors are things that you can change, such as:  · Obesity.  · Smoking cigarettes.  · Taking oral birth control, especially if you also use tobacco.  · Physical inactivity.  · Excessive alcohol use.  · Use of illegal drugs, especially cocaine and methamphetamine.  Other risk factors include:  · High blood pressure (hypertension).  · High cholesterol.  · Diabetes mellitus.  · Heart disease.  · Being , , , or .  · Being over age 60.  · Family history of stroke.  · Previous history of blood clots, stroke, or transient ischemic attack (TIA).  · Sickle cell disease.  · Being a woman  with a history of preeclampsia.  · Migraine headache.  · Sleep apnea.  · Irregular heartbeats, such as atrial fibrillation.  · Chronic inflammatory diseases, such as rheumatoid arthritis or lupus.  · Blood clotting disorders (hypercoagulable state).  What are the signs or symptoms?  Symptoms of this condition usually develop suddenly, or you may notice them after waking up from sleep. Symptoms may include sudden:  · Weakness or numbness in your face, arm, or leg, especially on one side of your body.  · Trouble walking or difficulty moving your arms or legs.  · Loss of balance or coordination.  · Confusion.  · Slurred speech (dysarthria).  · Trouble speaking, understanding speech, or both (aphasia).  · Vision changes--such as double vision, blurred vision, or loss of vision--in one or both eyes.  · Dizziness.  · Nausea and vomiting.  · Severe headache with no known cause. The headache is often described as the worst headache ever experienced.  If possible, make note of the exact time that you last felt like your normal self and what time your symptoms started. Tell your health care provider.  If symptoms come and go, this could be a sign of a warning stroke, or TIA. Get help right away, even if you feel better.  How is this diagnosed?  This condition may be diagnosed based on:  · Your symptoms, your medical history, and a physical exam.  · CT scan of the brain.  · MRI.  · CT angiogram. This test uses a computer to take X-rays of your arteries. A dye may be injected into your blood to show the inside of your blood vessels more clearly.  · MRI angiogram. This is a type of MRI that is used to evaluate the blood vessels.  · Cerebral angiogram. This test uses X-rays and a dye to show the blood vessels in the brain and neck.  You may need to see a health care provider who specializes in stroke care. A stroke specialist can be seen in person or through communication using telephone or television technology  (telemedicine).  Other tests may also be done to find the cause of the stroke, such as:  · Electrocardiogram (ECG).  · Continuous heart monitoring.  · Echocardiogram.  · Carotid ultrasound.  · A scan of the brain circulation.  · Blood tests.  · Sleep study to check for sleep apnea.  How is this treated?  Treatment for this condition will depend on the duration, severity, and cause of your symptoms and on the area of the brain affected. It is very important to get treatment at the first sign of stroke symptoms. Some treatments work better if they are done within 3-6 hours of the onset of stroke symptoms. These initial treatments may include:  · Aspirin.  · Medicines to control blood pressure.  · Medicine given by injection to dissolve the blood clot (thrombolytic).  · Treatments given directly to the affected artery to remove or dissolve the blood clot.  Other treatment options may include:  · Oxygen.  · IV fluids.  · Medicines to thin the blood (anticoagulants or antiplatelets).  · Procedures to increase blood flow.  Medicines and changes to your diet may be used to help treat and manage risk factors for stroke, such as diabetes, high cholesterol, and high blood pressure.  After a stroke, you may work with physical, speech, mental health, or occupational therapists to help you recover.  Follow these instructions at home:  Medicines  · Take over-the-counter and prescription medicines only as told by your health care provider.  · If you were told to take a medicine to thin your blood, such as aspirin or an anticoagulant, take it exactly as told by your health care provider.  ¨ Taking too much blood-thinning medicine can cause bleeding.  ¨ If you do not take enough blood-thinning medicine, you will not have the protection that you need against another stroke and other problems.  · Understand the side effects of taking anticoagulant medicine. When taking this type of medicine, make sure you:  ¨ Hold pressure over any  cuts for longer than usual.  ¨ Tell your dentist and other health care providers that you are taking anticoagulants before you have any procedures that may cause bleeding.  ¨ Avoid activities that may cause trauma or injury.  Eating and drinking  · Follow instructions from your health care provider about diet.  · Eat healthy foods.  · If your ability to swallow was affected by the stroke, you may need to take steps to avoid choking, such as:  ¨ Taking small bites when eating.  ¨ Eating foods that are soft or pureed.  Safety  · Follow instructions from your health care team about physical activity.  · Use a walker or cane as told by your health care provider.  · Take steps to create a safe home environment in order to reduce the risk of falls. This may include:  ¨ Having your home looked at by specialists.  ¨ Installing grab bars in the bedroom and bathroom.  ¨ Using safety equipment, such as raised toilets and a seat in the shower.  General instructions  · Do not use any tobacco products, such as cigarettes, chewing tobacco, and e-cigarettes. If you need help quitting, ask your health care provider.  · Limit alcohol intake to no more than 1 drink a day for nonpregnant women and 2 drinks a day for men. One drink equals 12 oz of beer, 5 oz of wine, or 1½ oz of hard liquor.  · If you need help to stop using drugs or alcohol, ask your health care provider about a referral to a program or specialist.  · Maintain an active and healthy lifestyle. Get regular exercise as told by your health care provider.  · Keep all follow-up visits as told by your health care provider, including visits with all specialists on your health care team. This is important.  How is this prevented?  Your risk of another stroke can be decreased by managing high blood pressure, high cholesterol, diabetes, heart disease, sleep apnea, and obesity. It can also be decreased by quitting smoking, limiting alcohol, and staying physically active.  Your  health care provider will continue to work with you on measures to prevent short-term and long-term complications of stroke.  Get help right away if:  You have:  · Sudden weakness or numbness in your face, arm, or leg, especially on one side of your body.  · Sudden confusion.  · Sudden trouble speaking, understanding, or both (aphasia).  · Sudden trouble seeing with one or both eyes.  · Sudden trouble walking or difficulty moving your arms or legs.  · Sudden dizziness.  · Sudden loss of balance or coordination.  · Sudden, severe headache with no known cause.  · A partial or total loss of consciousness.  · A seizure.  Any of these symptoms may represent a serious problem that is an emergency. Do not wait to see if the symptoms will go away. Get medical help right away. Call your local emergency services (911 in [place] Do not drive yourself to the hospital.   This information is not intended to replace advice given to you by your health care provider. Make sure you discuss any questions you have with your health care provider.  Document Released: 12/18/2006 Document Revised: 05/30/2017 Document Reviewed: 03/15/2017  [email or web address] Interactive Patient Education © 2017 [email or web address]    Meclizine tablets or capsules  What is this medicine?  MECLIZINE (MEK li zeen) is an antihistamine. It is used to prevent nausea, vomiting, or dizziness caused by motion sickness. It is also used to prevent and treat vertigo (extreme dizziness or a feeling that you or your surroundings are tilting or spinning around).  This medicine may be used for other purposes; ask your health care provider or pharmacist if you have questions.  COMMON BRAND NAME(S): Antivert, Dramamine Less Drowsy, Medivert, Meni-D  What should I tell my health care provider before I take this medicine?  They need to know if you have any of these conditions:  -glaucoma  -lung or breathing disease, like asthma  -problems urinating  -prostate disease  -stomach or intestine  problems  -an unusual or allergic reaction to meclizine, other medicines, foods, dyes, or preservatives  -pregnant or trying to get pregnant  -breast-feeding  How should I use this medicine?  Take this medicine by mouth with a glass of water. Follow the directions on the prescription label. If you are using this medicine to prevent motion sickness, take the dose at least 1 hour before travel. If it upsets your stomach, take it with food or milk. Take your doses at regular intervals. Do not take your medicine more often than directed.  Talk to your pediatrician regarding the use of this medicine in children. Special care may be needed.  Overdosage: If you think you have taken too much of this medicine contact a poison control center or emergency room at once.  NOTE: This medicine is only for you. Do not share this medicine with others.  What if I miss a dose?  If you miss a dose, take it as soon as you can. If it is almost time for your next dose, take only that dose. Do not take double or extra doses.  What may interact with this medicine?  Do not take this medicine with any of the following medications:  -MAOIs like Carbex, Eldepryl, Marplan, Nardil, and Parnate  This medicine may also interact with the following medications:  -alcohol  -antihistamines for allergy, cough and cold  -certain medicines for anxiety or sleep  -certain medicines for depression, like amitriptyline, fluoxetine, sertraline  -certain medicines for seizures like phenobarbital, primidone  -general anesthetics like halothane, isoflurane, methoxyflurane, propofol  -local anesthetics like lidocaine, pramoxine, tetracaine  -medicines that relax muscles for surgery  -narcotic medicines for pain  -phenothiazines like chlorpromazine, mesoridazine, prochlorperazine, thioridazine  This list may not describe all possible interactions. Give your health care provider a list of all the medicines, herbs, non-prescription drugs, or dietary supplements you  use. Also tell them if you smoke, drink alcohol, or use illegal drugs. Some items may interact with your medicine.  What should I watch for while using this medicine?  Tell your doctor or healthcare professional if your symptoms do not start to get better or if they get worse.  You may get drowsy or dizzy. Do not drive, use machinery, or do anything that needs mental alertness until you know how this medicine affects you. Do not stand or sit up quickly, especially if you are an older patient. This reduces the risk of dizzy or fainting spells. Alcohol may interfere with the effect of this medicine. Avoid alcoholic drinks.  Your mouth may get dry. Chewing sugarless gum or sucking hard candy, and drinking plenty of water may help. Contact your doctor if the problem does not go away or is severe.  This medicine may cause dry eyes and blurred vision. If you wear contact lenses you may feel some discomfort. Lubricating drops may help. See your eye doctor if the problem does not go away or is severe.  What side effects may I notice from receiving this medicine?  Side effects that you should report to your doctor or health care professional as soon as possible:  -feeling faint or lightheaded, falls  -fast, irregular heartbeat  Side effects that usually do not require medical attention (report to your doctor or health care professional if they continue or are bothersome):  -constipation  -headache  -trouble passing urine or change in the amount of urine  -trouble sleeping  -upset stomach  This list may not describe all possible side effects. Call your doctor for medical advice about side effects. You may report side effects to FDA at 5-466-FDA-1511.  Where should I keep my medicine?  Keep out of the reach of children.  Store at room temperature between 15 and 30 degrees C (59 and 86 degrees F). Keep container tightly closed. Throw away any unused medicine after the expiration date.  NOTE: This sheet is a summary. It may not  cover all possible information. If you have questions about this medicine, talk to your doctor, pharmacist, or health care provider.  © 2018 [employer] (2017-01-18 19:41:02)    Sitagliptin oral tablet  What is this medicine?  SITAGLIPTIN (sit a GLIP tin) helps to treat type 2 diabetes. It helps to control blood sugar. Treatment is combined with diet and exercise.  This medicine may be used for other purposes; ask your health care provider or pharmacist if you have questions.  COMMON BRAND NAME(S): Januvia  What should I tell my health care provider before I take this medicine?  They need to know if you have any of these conditions:  -diabetic ketoacidosis  -kidney disease  -pancreatitis  -previous swelling of the tongue, face, or lips with difficulty breathing, difficulty swallowing, hoarseness, or tightening of the throat  -type 1 diabetes  -an unusual or allergic reaction to sitagliptin, other medicines, foods, dyes, or preservatives  -pregnant or trying to get pregnant  -breast-feeding  How should I use this medicine?  Take this medicine by mouth with a glass of water. Follow the directions on the prescription label. You can take it with or without food. Do not cut, crush or chew this medicine. Take your dose at the same time each day. Do not take more often than directed. Do not stop taking except on your doctor's advice.  Talk to your pediatrician regarding the use of this medicine in children. Special care may be needed.  Overdosage: If you think you have taken too much of this medicine contact a poison control center or emergency room at once.  NOTE: This medicine is only for you. Do not share this medicine with others.  What if I miss a dose?  If you miss a dose, take it as soon as you can. If it is almost time for your next dose, take only that dose. Do not take double or extra doses.  What may interact with this medicine?  Do not take this medicine with any of the following  medications:  -gatifloxacin  This medicine may also interact with the following medications:  -alcohol  -digoxin  -insulin  -sulfonylureas like glimepiride, glipizide, glyburide  This list may not describe all possible interactions. Give your health care provider a list of all the medicines, herbs, non-prescription drugs, or dietary supplements you use. Also tell them if you smoke, drink alcohol, or use illegal drugs. Some items may interact with your medicine.  What should I watch for while using this medicine?  Visit your doctor or health care professional for regular checks on your progress.  A test called the HbA1C (A1C) will be monitored. This is a simple blood test. It measures your blood sugar control over the last 2 to 3 months. You will receive this test every 3 to 6 months.  Learn how to check your blood sugar. Learn the symptoms of low and high blood sugar and how to manage them.  Always carry a quick-source of sugar with you in case you have symptoms of low blood sugar. Examples include hard sugar candy or glucose tablets. Make sure others know that you can choke if you eat or drink when you develop serious symptoms of low blood sugar, such as seizures or unconsciousness. They must get medical help at once.  Tell your doctor or health care professional if you have high blood sugar. You might need to change the dose of your medicine. If you are sick or exercising more than usual, you might need to change the dose of your medicine.  Do not skip meals. Ask your doctor or health care professional if you should avoid alcohol. Many nonprescription cough and cold products contain sugar or alcohol. These can affect blood sugar.  Wear a medical ID bracelet or chain, and carry a card that describes your disease and details of your medicine and dosage times.  What side effects may I notice from receiving this medicine?  Side effects that you should report to your doctor or health care professional as soon as  possible:  -allergic reactions like skin rash, itching or hives, swelling of the face, lips, or tongue  -breathing problems  -fever, chills  -joint pain  -loss of appetite  -signs and symptoms of low blood sugar such as feeling anxious, confusion, dizziness, increased hunger, unusually weak or tired, sweating, shakiness, cold, irritable, headache, blurred vision, fast heartbeat, loss of consciousness  -unusual stomach pain or discomfort  -vomiting  Side effects that usually do not require medical attention (report to your doctor or health care professional if they continue or are bothersome):  -diarrhea  -headache  -sore throat  -stomach upset  -stuffy or runny nose  This list may not describe all possible side effects. Call your doctor for medical advice about side effects. You may report side effects to FDA at 8-845-FDA-0816.  Where should I keep my medicine?  Keep out of the reach of children.  Store at room temperature between 15 and 30 degrees C (59 and 86 degrees F). Throw away any unused medicine after the expiration date.  NOTE: This sheet is a summary. It may not cover all possible information. If you have questions about this medicine, talk to your doctor, pharmacist, or health care provider.  © 2018 [employer] (2015-08-28 17:46:21)    Atorvastatin tablets  What is this medicine?  ATORVASTATIN (a TORE va sta tin) is known as a HMG-CoA reductase inhibitor or 'statin'. It lowers the level of cholesterol and triglycerides in the blood. This drug may also reduce the risk of heart attack, stroke, or other health problems in patients with risk factors for heart disease. Diet and lifestyle changes are often used with this drug.  This medicine may be used for other purposes; ask your health care provider or pharmacist if you have questions.  COMMON BRAND NAME(S): Lipitor  What should I tell my health care provider before I take this medicine?  They need to know if you have any of these  conditions:  -frequently drink alcoholic beverages  -history of stroke, TIA  -kidney disease  -liver disease  -muscle aches or weakness  -other medical condition  -an unusual or allergic reaction to atorvastatin, other medicines, foods, dyes, or preservatives  -pregnant or trying to get pregnant  -breast-feeding  How should I use this medicine?  Take this medicine by mouth with a glass of water. Follow the directions on the prescription label. You can take this medicine with or without food. Take your doses at regular intervals. Do not take your medicine more often than directed.  Talk to your pediatrician regarding the use of this medicine in children. While this drug may be prescribed for children as young as 10 years old for selected conditions, precautions do apply.  Overdosage: If you think you have taken too much of this medicine contact a poison control center or emergency room at once.  NOTE: This medicine is only for you. Do not share this medicine with others.  What if I miss a dose?  If you miss a dose, take it as soon as you can. If it is almost time for your next dose, take only that dose. Do not take double or extra doses.  What may interact with this medicine?  Do not take this medicine with any of the following medications:  -red yeast rice  -telaprevir  -telithromycin  -voriconazole  This medicine may also interact with the following medications:  -alcohol  -antiviral medicines for HIV or AIDS  -boceprevir  -certain antibiotics like clarithromycin, erythromycin, troleandomycin  -certain medicines for cholesterol like fenofibrate or gemfibrozil  -cimetidine  -clarithromycin  -colchicine  -cyclosporine  -digoxin  -female hormones, like estrogens or progestins and birth control pills  -grapefruit juice  -medicines for fungal infections like fluconazole, itraconazole, ketoconazole  -niacin  -rifampin  -spironolactone  This list may not describe all possible interactions. Give your health care provider a  list of all the medicines, herbs, non-prescription drugs, or dietary supplements you use. Also tell them if you smoke, drink alcohol, or use illegal drugs. Some items may interact with your medicine.  What should I watch for while using this medicine?  Visit your doctor or health care professional for regular check-ups. You may need regular tests to make sure your liver is working properly.  Tell your doctor or health care professional right away if you get any unexplained muscle pain, tenderness, or weakness, especially if you also have a fever and tiredness. Your doctor or health care professional may tell you to stop taking this medicine if you develop muscle problems. If your muscle problems do not go away after stopping this medicine, contact your health care professional.  This drug is only part of a total heart-health program. Your doctor or a dietician can suggest a low-cholesterol and low-fat diet to help. Avoid alcohol and smoking, and keep a proper exercise schedule.  Do not use this drug if you are pregnant or breast-feeding. Serious side effects to an unborn child or to an infant are possible. Talk to your doctor or pharmacist for more information.  This medicine may affect blood sugar levels. If you have diabetes, check with your doctor or health care professional before you change your diet or the dose of your diabetic medicine.  If you are going to have surgery tell your health care professional that you are taking this drug.  What side effects may I notice from receiving this medicine?  Side effects that you should report to your doctor or health care professional as soon as possible:  -allergic reactions like skin rash, itching or hives, swelling of the face, lips, or tongue  -dark urine  -fever  -joint pain  -muscle cramps, pain  -redness, blistering, peeling or loosening of the skin, including inside the mouth  -trouble passing urine or change in the amount of urine  -unusually weak or  tired  -yellowing of eyes or skin  Side effects that usually do not require medical attention (report to your doctor or health care professional if they continue or are bothersome):  -constipation  -heartburn  -stomach gas, pain, upset  This list may not describe all possible side effects. Call your doctor for medical advice about side effects. You may report side effects to FDA at 8-025-LOX-3413.  Where should I keep my medicine?  Keep out of the reach of children.  Store at room temperature between 20 to 25 degrees C (68 to 77 degrees F). Throw away any unused medicine after the expiration date.  NOTE: This sheet is a summary. It may not cover all possible information. If you have questions about this medicine, talk to your doctor, pharmacist, or health care provider.  © 2018 [employer]/Gold Standard (2012-11-06 09:18:24)    Discharge Instructions    Discharged to other by medical transportation with relative. Discharged via wheelchair, hospital escort: Yes.  Special equipment needed: Not Applicable    Be sure to schedule a follow-up appointment with your primary care doctor or any specialists as instructed.     Discharge Plan:   Diet Plan: Discussed  Activity Level: Discussed  Confirmed Follow up Appointment: Appointment Scheduled  Confirmed Symptoms Management: Discussed  Medication Reconciliation Updated: Yes  Influenza Vaccine Indication: Indicated: Not available from distributor/    I understand that a diet low in cholesterol, fat, and sodium is recommended for good health. Unless I have been given specific instructions below for another diet, I accept this instruction as my diet prescription.   Other diet: Diabetic    Special Instructions: None    · Is patient discharged on Warfarin / Coumadin?   No     Depression / Suicide Risk    As you are discharged from this [facility] facility, it is important to learn how to keep safe from harming yourself.    Recognize the warning signs:  · Abrupt  changes in personality, positive or negative- including increase in energy   · Giving away possessions  · Change in eating patterns- significant weight changes-  positive or negative  · Change in sleeping patterns- unable to sleep or sleeping all the time   · Unwillingness or inability to communicate  · Depression  · Unusual sadness, discouragement and loneliness  · Talk of wanting to die  · Neglect of personal appearance   · Rebelliousness- reckless behavior  · Withdrawal from people/activities they love  · Confusion- inability to concentrate     If you or a loved one observes any of these behaviors or has concerns about self-harm, here's what you can do:  · Talk about it- your feelings and reasons for harming yourself  · Remove any means that you might use to hurt yourself (examples: pills, rope, extension cords, firearm)  · Get professional help from the community (Mental Health, Substance Abuse, psychological counseling)  · Do not be alone:Call your Safe Contact- someone whom you trust who will be there for you.  · Call your local CRISIS HOTLINE 928-2604 or 865-041-9613  · Call your local Children's Mobile Crisis Response Team [place] (483) 699-3826 or wwwGigwalk  · Call the toll free National Suicide Prevention Hotlines   · National Suicide Prevention Lifeline 464-219-RIMJ (6497)  · National Hope Line Network 800-SUICIDE (803-7572)              Discharge Instructions    Discharged to other by medical transportation with escort. Discharged via wheelchair, hospital escort: Yes.  Special equipment needed: Not Applicable    Be sure to schedule a follow-up appointment with your primary care doctor or any specialists as instructed.     Discharge Plan:   Diet Plan: Discussed  Activity Level: Discussed  Confirmed Follow up Appointment: Appointment Scheduled  Confirmed Symptoms Management: Discussed  Medication Reconciliation Updated: Yes  Influenza Vaccine Indication: Indicated: Not available from  distributor/    I understand that a diet low in cholesterol, fat, and sodium is recommended for good health. Unless I have been given specific instructions below for another diet, I accept this instruction as my diet prescription.   Other diet: Diabetic    Special Instructions: None    · Is patient discharged on Warfarin / Coumadin?   No     Depression / Suicide Risk    As you are discharged from this [facility] facility, it is important to learn how to keep safe from harming yourself.    Recognize the warning signs:  · Abrupt changes in personality, positive or negative- including increase in energy   · Giving away possessions  · Change in eating patterns- significant weight changes-  positive or negative  · Change in sleeping patterns- unable to sleep or sleeping all the time   · Unwillingness or inability to communicate  · Depression  · Unusual sadness, discouragement and loneliness  · Talk of wanting to die  · Neglect of personal appearance   · Rebelliousness- reckless behavior  · Withdrawal from people/activities they love  · Confusion- inability to concentrate     If you or a loved one observes any of these behaviors or has concerns about self-harm, here's what you can do:  · Talk about it- your feelings and reasons for harming yourself  · Remove any means that you might use to hurt yourself (examples: pills, rope, extension cords, firearm)  · Get professional help from the community (Mental Health, Substance Abuse, psychological counseling)  · Do not be alone:Call your Safe Contact- someone whom you trust who will be there for you.  · Call your local CRISIS HOTLINE 336-0038 or 495-588-6255  · Call your local Children's Mobile Crisis Response Team [place] (676) 809-6509 or www.SweetPerk  · Call the toll free National Suicide Prevention Hotlines   · National Suicide Prevention Lifeline 071-680-HNGY (5042)  · National Hope Line Network 800-SUICIDE (717-5252)

## 2019-08-21 NOTE — DISCHARGE PLANNING
Hospital Care Management Discharge Planning       Anticipated Discharge Disposition:   · [facility]     Action:   · This RN CM delivered completed COBRA form to MARIAH Lopez RN.     Barriers to Discharge:   · None.     Plan:   · No other D/C needs identified at this time.    · Continue to provide support services and assistance with discharge planning as needed.       Thank you for allowing me the pleasure of participating in this patient's care coordination and discharge planning.

## 2019-08-22 LAB
25(OH)D3 SERPL-MCNC: 20 NG/ML (ref 30–100)
ALBUMIN SERPL BCP-MCNC: 3.4 G/DL (ref 3.2–4.9)
ALBUMIN/GLOB SERPL: 1.2 G/DL
ALP SERPL-CCNC: 72 U/L (ref 30–99)
ALT SERPL-CCNC: 20 U/L (ref 2–50)
ANION GAP SERPL CALC-SCNC: 9 MMOL/L (ref 0–11.9)
AST SERPL-CCNC: 18 U/L (ref 12–45)
BASOPHILS # BLD AUTO: 0.4 % (ref 0–1.8)
BASOPHILS # BLD: 0.03 K/UL (ref 0–0.12)
BILIRUB SERPL-MCNC: 0.6 MG/DL (ref 0.1–1.5)
BUN SERPL-MCNC: 20 MG/DL (ref 8–22)
CALCIUM SERPL-MCNC: 8.8 MG/DL (ref 8.5–10.5)
CHLORIDE SERPL-SCNC: 108 MMOL/L (ref 96–112)
CO2 SERPL-SCNC: 22 MMOL/L (ref 20–33)
CREAT SERPL-MCNC: 0.63 MG/DL (ref 0.5–1.4)
EOSINOPHIL # BLD AUTO: 0.31 K/UL (ref 0–0.51)
EOSINOPHIL NFR BLD: 4 % (ref 0–6.9)
ERYTHROCYTE [DISTWIDTH] IN BLOOD BY AUTOMATED COUNT: 52.9 FL (ref 35.9–50)
GLOBULIN SER CALC-MCNC: 2.8 G/DL (ref 1.9–3.5)
GLUCOSE BLD-MCNC: 133 MG/DL (ref 65–99)
GLUCOSE BLD-MCNC: 197 MG/DL (ref 65–99)
GLUCOSE BLD-MCNC: 280 MG/DL (ref 65–99)
GLUCOSE BLD-MCNC: 380 MG/DL (ref 65–99)
GLUCOSE SERPL-MCNC: 174 MG/DL (ref 65–99)
HCT VFR BLD AUTO: 41.6 % (ref 37–47)
HGB BLD-MCNC: 13.7 G/DL (ref 12–16)
IMM GRANULOCYTES # BLD AUTO: 0.02 K/UL (ref 0–0.11)
IMM GRANULOCYTES NFR BLD AUTO: 0.3 % (ref 0–0.9)
LYMPHOCYTES # BLD AUTO: 3.34 K/UL (ref 1–4.8)
LYMPHOCYTES NFR BLD: 43.2 % (ref 22–41)
MAGNESIUM SERPL-MCNC: 1.6 MG/DL (ref 1.5–2.5)
MCH RBC QN AUTO: 31.4 PG (ref 27–33)
MCHC RBC AUTO-ENTMCNC: 32.9 G/DL (ref 33.6–35)
MCV RBC AUTO: 95.2 FL (ref 81.4–97.8)
MONOCYTES # BLD AUTO: 0.55 K/UL (ref 0–0.85)
MONOCYTES NFR BLD AUTO: 7.1 % (ref 0–13.4)
NEUTROPHILS # BLD AUTO: 3.48 K/UL (ref 2–7.15)
NEUTROPHILS NFR BLD: 45 % (ref 44–72)
NRBC # BLD AUTO: 0 K/UL
NRBC BLD-RTO: 0 /100 WBC
PLATELET # BLD AUTO: 155 K/UL (ref 164–446)
PMV BLD AUTO: 10.7 FL (ref 9–12.9)
POTASSIUM SERPL-SCNC: 3.9 MMOL/L (ref 3.6–5.5)
PROT SERPL-MCNC: 6.2 G/DL (ref 6–8.2)
RBC # BLD AUTO: 4.37 M/UL (ref 4.2–5.4)
SODIUM SERPL-SCNC: 139 MMOL/L (ref 135–145)
WBC # BLD AUTO: 7.7 K/UL (ref 4.8–10.8)

## 2019-08-22 PROCEDURE — 700111 HCHG RX REV CODE 636 W/ 250 OVERRIDE (IP): Performed by: PHYSICAL MEDICINE & REHABILITATION

## 2019-08-22 PROCEDURE — 36415 COLL VENOUS BLD VENIPUNCTURE: CPT

## 2019-08-22 PROCEDURE — 82306 VITAMIN D 25 HYDROXY: CPT

## 2019-08-22 PROCEDURE — 97112 NEUROMUSCULAR REEDUCATION: CPT

## 2019-08-22 PROCEDURE — 92523 SPEECH SOUND LANG COMPREHEN: CPT

## 2019-08-22 PROCEDURE — 80053 COMPREHEN METABOLIC PANEL: CPT

## 2019-08-22 PROCEDURE — 770010 HCHG ROOM/CARE - REHAB SEMI PRIVAT*

## 2019-08-22 PROCEDURE — 82962 GLUCOSE BLOOD TEST: CPT

## 2019-08-22 PROCEDURE — 97161 PT EVAL LOW COMPLEX 20 MIN: CPT

## 2019-08-22 PROCEDURE — 99255 IP/OBS CONSLTJ NEW/EST HI 80: CPT | Performed by: HOSPITALIST

## 2019-08-22 PROCEDURE — 700102 HCHG RX REV CODE 250 W/ 637 OVERRIDE(OP): Performed by: PHYSICAL MEDICINE & REHABILITATION

## 2019-08-22 PROCEDURE — A9270 NON-COVERED ITEM OR SERVICE: HCPCS | Performed by: PHYSICAL MEDICINE & REHABILITATION

## 2019-08-22 PROCEDURE — 83735 ASSAY OF MAGNESIUM: CPT

## 2019-08-22 PROCEDURE — 97535 SELF CARE MNGMENT TRAINING: CPT

## 2019-08-22 PROCEDURE — 85025 COMPLETE CBC W/AUTO DIFF WBC: CPT

## 2019-08-22 PROCEDURE — 99233 SBSQ HOSP IP/OBS HIGH 50: CPT | Performed by: PHYSICAL MEDICINE & REHABILITATION

## 2019-08-22 PROCEDURE — 97530 THERAPEUTIC ACTIVITIES: CPT

## 2019-08-22 RX ADMIN — ENALAPRIL MALEATE 5 MG: 5 TABLET ORAL at 06:38

## 2019-08-22 RX ADMIN — ASPIRIN 81 MG: 81 TABLET, COATED ORAL at 08:16

## 2019-08-22 RX ADMIN — ATORVASTATIN CALCIUM 80 MG: 40 TABLET, FILM COATED ORAL at 20:51

## 2019-08-22 RX ADMIN — INSULIN HUMAN 5 UNITS: 100 INJECTION, SOLUTION PARENTERAL at 20:52

## 2019-08-22 RX ADMIN — SENNOSIDES, DOCUSATE SODIUM 2 TABLET: 50; 8.6 TABLET, FILM COATED ORAL at 20:51

## 2019-08-22 RX ADMIN — INSULIN HUMAN 8 UNITS: 100 INJECTION, SOLUTION PARENTERAL at 11:36

## 2019-08-22 RX ADMIN — ENOXAPARIN SODIUM 40 MG: 100 INJECTION SUBCUTANEOUS at 08:24

## 2019-08-22 RX ADMIN — INSULIN HUMAN 2 UNITS: 100 INJECTION, SOLUTION PARENTERAL at 08:21

## 2019-08-22 RX ADMIN — VITAMIN D, TAB 1000IU (100/BT) 2000 UNITS: 25 TAB at 10:27

## 2019-08-22 RX ADMIN — SENNOSIDES, DOCUSATE SODIUM 2 TABLET: 50; 8.6 TABLET, FILM COATED ORAL at 10:27

## 2019-08-22 RX ADMIN — SITAGLIPTIN 25 MG: 50 TABLET, FILM COATED ORAL at 08:16

## 2019-08-22 ASSESSMENT — MONTREAL COGNITIVE ASSESSMENT (MOCA)
10. [FLUENCY] NAME WORDS STARTING WITH DESIGNATED LETTER: 0
4. NAME EACH OF THE THREE ANIMALS SHOWN: 2
ORIENTATION SUBSCORE: 6
6. READ LIST OF DIGITS [FORWARD/BACKWARD]: 1
WHAT IS THE TOTAL SCORE (OUT OF 30): 18
12. MEMORY INDEX SCORE: 0
VISUOSPATIAL/EXECUTIVE SUBSCORE: 3
7. [VIGILENCE] TAP WHEN HEARING DESIGNATED LETTER: 1
9. REPEAT EACH SENTENCE: 1
11. FOR EACH PAIR OF WORDS, WHAT CATEGORY DO THEY BELONG TO (OUT OF 2): 2
WHAT LEVEL OF EDUCATION WAS ATTAINED: LESS THAN HIGH SCHOOL EDUCATION
LEVEL OF SEVERITY: MILD COGNITIVE IMPAIRMENT
8. SERIAL SUBTRACTION OF 7S: 1

## 2019-08-22 ASSESSMENT — PATIENT HEALTH QUESTIONNAIRE - PHQ9
2. FEELING DOWN, DEPRESSED, IRRITABLE, OR HOPELESS: NOT AT ALL
1. LITTLE INTEREST OR PLEASURE IN DOING THINGS: NOT AT ALL
SUM OF ALL RESPONSES TO PHQ9 QUESTIONS 1 AND 2: 0

## 2019-08-22 NOTE — THERAPY
Occupational Therapy  Daily Treatment     Patient Name: Noemi Lopez  Age:  69 y.o., Sex:  female  Medical Record #: [id number]  Today's Date: 8/22/2019     Precautions  Precautions: Fall Risk  Comments: Diplopia    Safety   ADL Safety : Requires Supervision for Safety  Bathroom Safety: Requires Supervision for Safety    Subjective    Pt agreeable to participate in OT. Family present (son, daughter and granddaughter)      Objective       08/22/19 0901   Precautions   Precautions Fall Risk   Comments Diplopia   Safety    ADL Safety  Requires Supervision for Safety   Bathroom Safety Requires Supervision for Safety   Cognition    Level of Consciousness Alert   Comments Decreased problem solving    Balance   Sitting Balance (Static) Fair +   Sitting Balance (Dynamic) Fair +   Standing Balance (Static) Fair   Standing Balance (Dynamic) Fair -   Interdisciplinary Plan of Care Collaboration   IDT Collaboration with  Family / Caregiver   Patient Position at End of Therapy Seated;Self Releasing Lap Belt Applied   Collaboration Comments Granddaughter and son assisted with communication    OT Total Time Spent   OT Individual Total Time Spent (Mins) 30   OT Charge Group   OT Neuromuscular Re-education / Balance 1   OT Therapy Activity 1     Patient partially completed 24 jumbo piece puzzle with moderate to maximal vc's for problem solving.     Assessment    Patient tolerated OT session well with focus on standing balance, cognitive and visual perceptual skills. Patient presents with decreased problem solving and visual perceptual skills and requires moderate to maximal cues to complete tasks. Discussed with family importance of allowing patient increased time to process information and problem solve rather than immediately initiating assisting her with tasks.     Plan    ADL and IADLs, functional mobility, standing balance, endurance, visual perceptual activities

## 2019-08-22 NOTE — THERAPY
Physical Therapy   Daily Treatment     Patient Name: Noemi Lopez  Age:  69 y.o., Sex:  female  Medical Record #: [id number]  Today's Date: 8/22/2019     Precautions  Precautions: Fall Risk  Comments: Diplopia    Subjective    Pt asleep in bed with family present in room, agreeable to PT once aroused.     Objective       08/22/19 1431   Precautions   Precautions Fall Risk   Comments Diplopia   Interdisciplinary Plan of Care Collaboration   IDT Collaboration with  Family / Caregiver   Patient Position at End of Therapy In Bed;Family / Friend in Room   Collaboration Comments Family present and assisting with translation   PT Total Time Spent   PT Individual Total Time Spent (Mins) 30   PT Charge Group   PT Therapeutic Activities 2     Ambulation with FWW 2x125' CGA and seated rest breaks. Ambulation with HHA  (Juvenal) 125', with seated rest breaks. Pt with L lateral lean, VCs for obstacle navigation on L.    Pt issued new manual WC for increased comfort and ease of propulsion. Pt propelled WC SBA with VCs for obstacle navigation, 125'    Discussed transfer safety and sequencing including use of WC breaks, hand positioning during STS, set-up for Wc > bed transfer. Also discussed CLOF and POC with pt and family.    Bed <> WC, CGA without AD      Assessment    Pt with increased L lateral lean during ambulation with HHA vs FWW, requiring min A for balance/support and VC for obstacle navigation. Pt requiring cueing for safety awareness during functional mobility.     Plan    Progress gait, standing balance, reinforce safety with functional mobility, family training for guarding, safety, and transfers, complete standardized balance assessment

## 2019-08-22 NOTE — THERAPY
Occupational Therapy  Daily Treatment     Patient Name: Noemi Lopez  Age:  69 y.o., Sex:  female  Medical Record #: [id number]  Today's Date: 2019     Precautions: Fall Risk      Subjective    Pt asleep but arousable when undersigned therapist arrived for scheduled 7am OT session.      Objective       19 0701   Precautions   Precautions Fall Risk   IADL Treatments   Meal Preparation Patient completed meal prep task (cooked egg) with CGA to SBA for standing balance and safety. Patient used counter for UE support while walking during kitchen task.    Balance   Sitting Balance (Static) Fair +   Sitting Balance (Dynamic) Fair +   Standing Balance (Static) Fair   Standing Balance (Dynamic) Fair -   Interdisciplinary Plan of Care Collaboration   IDT Collaboration with  Family / Caregiver   Patient Position at End of Therapy Seated;Self Releasing Lap Belt Applied   Collaboration Comments Daughter assisted with communication    OT Total Time Spent   OT Individual Total Time Spent (Mins) 60   OT Charge Group   OT Self Care / ADL 3   OT Neuromuscular Re-education / Balance 1       FIM Grooming Score:  5 - Standby Prompting/Supervision or Set-up  Grooming Description:  Increased time, Set-up of equipment(Patient combed hair with set-up while seated in w/c)    FIM Bathing Score:  5 - Standby Prompting/Supervision or Set-up  Bathing Description:   FIM Bathing Score:  5 - Standby Prompting/Supervision or Set-up  Bathing Description:  Grab bar, Tub bench, Hand rails, Hand held shower, Increased time, Supervision for safety, Set-up of equipment(Patient washed, rinsed and dried all body parts with supervision for safety and standing balance. )    FIM Upper Body Dressin - Standby Prompting/Supervision or Set-up  Upper Body Dressing Description:  Increased time, Set-up of equipment(Pt donned bra and blouse with set-up while seated in w/c )    FIM Lower Body Dressing Score:  5 - Standby Prompting/Supervsion or  Set-up  Lower Body Dressing Description:  Assist device/equipment, Supervision for safety, Set-up of equipment, Increased time(Patient donned underwear, pants, and non skid socks with supervision for standing balance and safety.  Used grab bar for steadying assist )    FIM Tub/Shower Transfers Score:     5 - Standby Prompting/Supervision or Set-up  Tub/Shower Transfers Description:         Assessment    Patient tolerated OT session well with focus on ADLs/IADLs. Requires supervision at this time during standing tasks (ie washing/drying buttocks, functional transfers). Functional performance limited by decreased endurance, balance, and visual perceptual abilities (diplopia).     Plan    ADL and IADLs, functional mobility, standing balance, endurance, visual perceptual activities

## 2019-08-22 NOTE — THERAPY
Speech Language Pathology   Initial Assessment     Patient Name: Noemi Lopez  AGE:  69 y.o., SEX:  female  Medical Record #: [id number]  Today's Date: 8/22/2019     Subjective    Patient is a 69 y.o. Montenegrin speaking female admitted to [facility] with dizziness, nausea/emesis. MRI with right paramedian dorsal midbrain and right thalamic strokes. Past medical history includes hypertension, and diabetes.   Patient lives with family and spilts the years between [place] and [place]. Per family report, patient was independent for ADL's but required some assist for IADL's.      Objective       08/22/19 0932   Prior Living Situation   Prior Services None   Housing / Facility 1 Story House   Lives with - Patient's Self Care Capacity Adult Children   Prior Level Of Function   Communication Within Functional Limits   Swallow Within Functional Limits   Dentition Poor Quality    Dentures None   Hearing Within Functional Limits for Evaluation   Hearing Aid None   Patient's Primary Language Montenegrin   Education Grade School   Education Years Completed 6   Occupation (Pre-Hospital Vocational) Homemaker   Receptive Language / Auditory Comprehension   Receptive Language / Auditory Comprehension WDL   Expressive Language   Expressive Language (WDL) WDL   Cognition   Attention to Task Within Functional Limits (6-7)   Simple Attention Within Functional Limits (6-7)   Orientation  Within Functional Limits (6-7)   Visual Scanning / Cancellation Skills Moderate (3)   Verbal Short Term Memory 10 Minutes   Simple Reasoning / Problem Solving Supervision (5)   Clock Drawing Disorganization   IRF-LISS:  Cognitive Pattern Assessment   Cognitive Pattern Assessment Used BIMS   Outcome Measures   Outcome Measures Utilized MoCA   MoCA (Didier Cognitive Assessment)   Visuospatial/Executive 3   Naming 2   Attention - Digits 1   Attention - Letters 1   Attention - Serial 7 Subtraction 1   Language - Repeat 1   Language - Fluency 0   Language -  Abstraction 2   Delayed Recall 0   Orientation 6   Level of Education 1   Total 18   Level of Severity Mild cognitive impairment   Problem List   Problem List Cognitive-Linguistic Deficits   Current Discharge Plan   Current Discharge Plan Return to Prior Living Situation   Benefit   Therapy Benefit Patient would not benefit from Inpatient Rehab Speech-Language Pathology.  No further Speech Therapy recommended at this time.   Speech Language Pathologist Assigned   Assigned SLP / Pager # No ST   SLP Total Time Spent   SLP Individual Total Time Spent (Mins) 60   SLP Charge Group   SLP Speech Language Evaluation Speech Sound Language Comprehension       FIM Eating Score:     Eating Description:       FIM Comprehension Score:  6 - Modified Independent  Comprehension Description:       FIM Expression Score:  6 - Modified Independent  Expression Description:       FIM Social Interaction Score:  6 - Modified Independent  Social Interaction Description:       FIM Problem Solving Score:  4 - Minimal Assistance  Problem Solving Description:       FIM Memory Score:  4 - Minimal Assistance  Memory Description:       Assessment    Patient is 69 y.o. female with a diagnosis of stroke.  Additional factors influencing patient status/progress (ie: cognitive factors, co-morbidities, social support, etc): family support.    Cognitive linguistic evaluation was completed. Patient participated in MOCA with a final score of 18/30 indicating cognitive deficits. Patient reports only completing 6th grade and does not report any new cognitive deficits. Patient's family also report cognitive baseline.     Plan  Speech therapy is not recommended at this time as patient demonsrates cognitive baseline.

## 2019-08-22 NOTE — THERAPY
Physical Therapy   Initial Evaluation     Patient Name: Noemi Lopez  Age:  69 y.o., Sex:  female  Medical Record #: [id number]  Today's Date: 8/22/2019     Subjective    Pt in room with family present, agreeable to PT evaluation     Objective       08/22/19 1301   Prior Living Situation   Prior Services None   Housing / Facility 1 Story House   Steps Into Home 2   Rail None   Equipment Owned None   Lives with - Patient's Self Care Capacity Adult Children   Prior Level of Functional Mobility   Bed Mobility Independent   Transfer Status Independent   Ambulation Independent   Assistive Devices Used None   Stairs Independent   IRF-LISS:  Prior Functioning: Everyday Activities   Self Care Independent   Indoor Mobility (Ambulation) Independent   Stairs Independent   Functional Cognition Independent   Prior Device Use None of the given options   Passive ROM Lower Body   Passive ROM Lower Body WDL   Active ROM Lower Body    Active ROM Lower Body  WDL   Strength Lower Body   Lower Body Strength  WDL   Sensation Lower Body   Lower Extremity Sensation   WDL   Balance Assessment   Sitting Balance (Static) Fair   Sitting Balance (Dynamic) Fair   Standing Balance (Static) Fair   Standing Balance (Dynamic) Fair -   Weight Shift Sitting Good   Weight Shift Standing Fair   Comments Standing balance with FWW   Neurological Concerns   Standing Posture During ADL's Lateral Lean Left  (during amb)   IRF-LISS:  Roll Left and Right   Assistance Needed Supervision   CARE Score 4   Discharge Goal:  Assistance Needed Independent   Discharge Goal:  Score 6   IRF-LISS:  Sit to Lying   Assistance Needed Supervision   CARE Score 4   Discharge Goal:  Assistance Needed Independent   Discharge Goal:  Score 6   IRF-LISS:  Lying to Sitting on Side of Bed   Assistance Needed Incidental touching   CARE Score 4   Discharge Goal:  Assistance Needed Independent   Discharge Goal:  Score 6   IRF-LISS:  Sit to Stand   Assistance Needed Incidental  touching;Adaptive equipment   CARE Score 4   Discharge Goal:  Assistance Needed Independent;Adaptive equipment   Discahrge Goal:  Score 6   IRF-LISS:  Chair/Bed-to-Chair Transfer   Assistance Needed Incidental touching;Adaptive equipment   CARE Score 4   Discharge Goal:  Assistance Needed Independent;Adaptive equipment   Discharge Goal:  Score 6   IRF-LISS:  Car Transfer   Assistance Needed Incidental touching;Verbal cues;Adaptive equipment   CARE Score 4   Discharge Goal:  Assistance Needed Supervision;Adaptive equipment   Discharge Goal:  Score 4   IRF LISS:  Walking   Does the Patient Walk? Yes   IRF LISS:  Walk 10 Feet   Assistance Needed Incidental touching;Adaptive equipment;Verbal cues   CARE Score 4   Discharge Goal:  Assistance Needed Independent;Adaptive equipment   Discharge Goal:  Score 6   IRF-LISS:  Walk 50 Feet with Two Turns   Assistance Needed Incidental touching;Adaptive equipment;Verbal cues   CARE Score 4   Discharge Goal:  Assistance Needed Independent;Adaptive equipment   Discharge Goal:  Score 6   IRF-LISS:  Walk 150 Feet   Assistance Needed Incidental touching;Adaptive equipment   CARE Score 4   Discharge Goal:  Assistance Needed Independent;Adaptive equipment   Discharge Goal:  Score 6   IRF LISS:  Walking 10 Feet on Uneven Surfaces   Assistance Needed Incidental touching;Adaptive equipment;Verbal cues   CARE Score 4   Discharge Goal:  Assistance Needed Independent;Adaptive equipment   Discharge Goal:  Score 6   IRF LISS:  1 Step (Curb)   Assistance Needed Incidental touching;Adaptive equipment   CARE Score 4   Discharge Goal:  Assistance Needed Independent;Adaptive equipment   Discharge Goal:  Score 6   IRF-LISS:  4 Steps   Assistance Needed Incidental touching;Adaptive equipment   CARE Score 4   Discharge Goal:  Assistance Needed Independent;Adaptive equipment   Discharge Goal:  Score 6   IRF LISS:  12 Steps   Reason if not Attempted Safety concerns   CARE Score 88   Discharge Goal:  Assistance  "Needed Supervision;Adaptive equipment   Discharge Goal:  Score 4   IRF LISS:  Picking Up Object   Assistance Needed Incidental touching;Adaptive equipment   CARE Score 4   Discharge Goal:  Assistance Needed Independent;Adaptive equipment   Discharge Goal:  Score 6   IRF-LISS:  Wheel 50 Feet with Two Turns   Indicate the Type of Wheelchair or Scooter Used Manual   Assistance Needed Supervision;Verbal cues   CARE Score 4   Discharge Goal:  Assistance Needed Independent   Discharge Goal:  Score 6   IRF-LISS:  Wheel 150 Feet   Indicate the Type of Wheelchair or Scooter Used Manual   Assistance Needed Supervision;Verbal cues   CARE Score 4   Discharge Goal:  Assistance Needed Independent   Discharge Goal:  Score 6   Problem List    Problems Impaired Balance;Impaired Coordination;Impaired Vision;Decreased Activity Tolerance;Safety Awareness Deficits / Cognition;Impaired Ambulation   Precautions   Precautions Fall Risk   Comments Diplopia   Current Discharge Plan   Current Discharge Plan Return to Prior Living Situation   Interdisciplinary Plan of Care Collaboration   IDT Collaboration with  Family / Caregiver   Patient Position at End of Therapy In Bed;Call Light within Reach;Tray Table within Reach;Family / Friend in Room   Collaboration Comments Family present and assisting with translation   Benefit   Therapy Benefit Patient Would Benefit from Inpatient Rehabilitation Physical Therapy to Maximize Functional [place] with ADLs, IADLs and Mobility.   PT Total Time Spent   PT Individual Total Time Spent (Mins) 60   PT Charge Group   PT Therapeutic Activities 1   PT Evaluation PT Evaluation Low     Bed <> WC: bed mobility SPV, stand step transfer CGA with FWW     Ambulation with FWW and CGA, 250' Pt with L mild lateral lean during ambulation worse with fatigue.      Wc propulsion 150' SBA with increased time and VC    6 4\" steps with B HR, CGA, step to pattern     Assessment  Patient is 69 y.o. female with a diagnosis of " R paramedial midbrain/thalamic CVA.  Additional factors influencing patient status / progress (ie: cognitive factors, co-morbidities, social support, etc): Independent PLOF, PMH HTN and DM, lives with son and adult granddaughter, son reports family can provide care 24/7 upon d/c.  Pt presents with impaired standing balance, L lateral lean during gait, diplopia, dizziness with sit to stand. Pt could benefit from skilled PT services to facilitate return to PLOF.     Plan  Recommend Physical Therapy  minutes per day 5-7 days per week for 1-2 weeks for the following treatments:  PT Group Therapy, PT Gait Training, PT Therapeutic Exercises, PT Neuro Re-Ed/Balance, PT Aquatic Therapy and PT Therapeutic Activity.    Goals:  Long term and short term goals have been discussed with patient and they are in agreement.    Physical Therapy Problems     Problem: Balance     Dates: Start: 08/22/19       Description:     Goal: STG-Within one week, patient will maintain dynamic standing     Dates: Start: 08/22/19       Description: 1) Individualized goal:  10 mins, intermittent UE support, SBA  2) Interventions:  PT Group Therapy, PT Gait Training, PT Therapeutic Exercises, PT Neuro Re-Ed/Balance and PT Therapeutic Activity                   Problem: Mobility     Dates: Start: 08/22/19       Description:     Goal: STG-Within one week, patient will ambulate community distances     Dates: Start: 08/22/19       Description: 1) Individualized goal:  250' CGA, no AD  2) Interventions:  PT Group Therapy, PT Gait Training, PT Therapeutic Exercises, PT Neuro Re-Ed/Balance and PT Therapeutic Activity                   Problem: Mobility Transfers     Dates: Start: 08/22/19       Description:     Goal: STG-Within one week, patient will transfer bed to chair     Dates: Start: 08/22/19       Description: 1) Individualized goal:  Bed <> Wc, SPV, no AD  2) Interventions:  PT Group Therapy, PT Gait Training, PT Therapeutic Exercises, PT Neuro  "Re-Ed/Balance and PT Therapeutic Activity                    Problem: PT-Long Term Goals     Dates: Start: 08/22/19       Description:     Goal: LTG-By discharge, patient will maintain balance     Dates: Start: 08/22/19       Description: 1) Individualized goal:  Complete standardized balance assessment with score indicating low fall risk  2) Interventions:  PT Group Therapy, PT Gait Training, PT Therapeutic Exercises, PT Neuro Re-Ed/Balance and PT Therapeutic Activity             Goal: LTG-By discharge, patient will ambulate     Dates: Start: 08/22/19       Description: 1) Individualized goal:  500' over varied surfaces, mod I, LRAD  2) Interventions:  PT Group Therapy, PT Gait Training, PT Therapeutic Exercises, PT Neuro Re-Ed/Balance and PT Therapeutic Activity             Goal: LTG-By discharge, patient will ambulate up/down flight of stairs     Dates: Start: 08/22/19       Description: 1) Individualized goal:  12 6\" steps, SPV, B HRs  2) Interventions:  PT Group Therapy, PT Gait Training, PT Therapeutic Exercises, PT Neuro Re-Ed/Balance and PT Therapeutic Activity             Goal: LTG-By discharge, patient will transfer in/out of a car     Dates: Start: 08/22/19       Description: 1) Individualized goal:  SPV, LRAD  2) Interventions:  PT Group Therapy, PT Gait Training, PT Therapeutic Exercises, PT Neuro Re-Ed/Balance and PT Therapeutic Activity                       "

## 2019-08-22 NOTE — CARE PLAN
Problem: Safety  Goal: Will remain free from injury  Note:   Patient educated on the importance of using call light for assistance, patient verbalized understanding. Family at bedside tonight. Call light and belongings within reach, bed in lowest and locked position, bed rails up x, and non skid socks on. Hourly rounding in place.      Problem: GLYCEMIA IMBALANCE  Goal: Clinical indication of glycemia balance is achieved  Note:   Patient's FSBS at HS= 198. Patient given 2 units of insulin per sliding scale. No s/s of hyperglycemia noted at this time. Will continue to assess for s/s of hyper/hypoglycemia.

## 2019-08-23 LAB
GLUCOSE BLD-MCNC: 157 MG/DL (ref 65–99)
GLUCOSE BLD-MCNC: 170 MG/DL (ref 65–99)
GLUCOSE BLD-MCNC: 228 MG/DL (ref 65–99)
GLUCOSE BLD-MCNC: 256 MG/DL (ref 65–99)

## 2019-08-23 PROCEDURE — 97112 NEUROMUSCULAR REEDUCATION: CPT

## 2019-08-23 PROCEDURE — 700102 HCHG RX REV CODE 250 W/ 637 OVERRIDE(OP): Performed by: HOSPITALIST

## 2019-08-23 PROCEDURE — 99232 SBSQ HOSP IP/OBS MODERATE 35: CPT | Performed by: HOSPITALIST

## 2019-08-23 PROCEDURE — 770010 HCHG ROOM/CARE - REHAB SEMI PRIVAT*

## 2019-08-23 PROCEDURE — 99231 SBSQ HOSP IP/OBS SF/LOW 25: CPT | Performed by: PHYSICAL MEDICINE & REHABILITATION

## 2019-08-23 PROCEDURE — A9270 NON-COVERED ITEM OR SERVICE: HCPCS | Performed by: HOSPITALIST

## 2019-08-23 PROCEDURE — 97116 GAIT TRAINING THERAPY: CPT

## 2019-08-23 PROCEDURE — 97535 SELF CARE MNGMENT TRAINING: CPT

## 2019-08-23 PROCEDURE — 700102 HCHG RX REV CODE 250 W/ 637 OVERRIDE(OP): Performed by: PHYSICAL MEDICINE & REHABILITATION

## 2019-08-23 PROCEDURE — 700111 HCHG RX REV CODE 636 W/ 250 OVERRIDE (IP): Performed by: PHYSICAL MEDICINE & REHABILITATION

## 2019-08-23 PROCEDURE — A9270 NON-COVERED ITEM OR SERVICE: HCPCS | Performed by: PHYSICAL MEDICINE & REHABILITATION

## 2019-08-23 PROCEDURE — 97110 THERAPEUTIC EXERCISES: CPT

## 2019-08-23 PROCEDURE — 82962 GLUCOSE BLOOD TEST: CPT | Mod: 91

## 2019-08-23 RX ORDER — DEXTROSE MONOHYDRATE 25 G/50ML
50 INJECTION, SOLUTION INTRAVENOUS
Status: DISCONTINUED | OUTPATIENT
Start: 2019-08-23 | End: 2019-08-28 | Stop reason: HOSPADM

## 2019-08-23 RX ADMIN — INSULIN HUMAN 2 UNITS: 100 INJECTION, SOLUTION PARENTERAL at 08:04

## 2019-08-23 RX ADMIN — ENOXAPARIN SODIUM 40 MG: 100 INJECTION SUBCUTANEOUS at 08:04

## 2019-08-23 RX ADMIN — ATORVASTATIN CALCIUM 80 MG: 40 TABLET, FILM COATED ORAL at 20:51

## 2019-08-23 RX ADMIN — ASPIRIN 81 MG: 81 TABLET, COATED ORAL at 08:03

## 2019-08-23 RX ADMIN — MAGNESIUM OXIDE TAB 400 MG (241.3 MG ELEMENTAL MG) 400 MG: 400 (241.3 MG) TAB at 11:22

## 2019-08-23 RX ADMIN — SENNOSIDES, DOCUSATE SODIUM 2 TABLET: 50; 8.6 TABLET, FILM COATED ORAL at 08:03

## 2019-08-23 RX ADMIN — SITAGLIPTIN 25 MG: 50 TABLET, FILM COATED ORAL at 08:04

## 2019-08-23 RX ADMIN — ENALAPRIL MALEATE 5 MG: 5 TABLET ORAL at 05:10

## 2019-08-23 RX ADMIN — VITAMIN D, TAB 1000IU (100/BT) 2000 UNITS: 25 TAB at 08:03

## 2019-08-23 NOTE — THERAPY
Physical Therapy   Daily Treatment     Patient Name: Noemi Lopez  Age:  69 y.o., Sex:  female  Medical Record #: [id number]  Today's Date: 8/23/2019     Precautions  Precautions: Fall Risk  Comments: American speaking, diplopia, dizziness    Subjective    Pt seated in therapy gym with son present, agreeable to PT      Objective       08/23/19 0931   Precautions   Precautions Fall Risk   Comments American speaking, diplopia, dizziness   Sitting Lower Body Exercises   Nustep Resistance Level 4  (10 min )   Interdisciplinary Plan of Care Collaboration   IDT Collaboration with  Family / Caregiver   Patient Position at End of Therapy Seated   Collaboration Comments pt's son present for PT session    PT Total Time Spent   PT Individual Total Time Spent (Mins) 30   PT Charge Group   PT Gait Training 1   PT Therapeutic Exercise 1       FIM Walking Score:  4 - Minimal Assistance  Walking Description:  Walker, Extra time, Supervision for safety, Requires incidental assist(250 ft with FWW, 200 ft with no AD and HHA, SBA- CGA throughout)      Assessment    Pt motivated and fully participatory in session. Continues to be limited with L lateral lean during ambulation.     Plan    Progress gait, standing balance, reinforce safety with functional mobility, family training for guarding, safety, and transfers, complete standardized balance assessment

## 2019-08-23 NOTE — THERAPY
Physical Therapy   Daily Treatment     Patient Name: Noemi Lopez  Age:  69 y.o., Sex:  female  Medical Record #: [id number]  Today's Date: 8/23/2019     Precautions  Precautions: Fall Risk  Comments: Telugu speaking, dizziness    Subjective    Pt seated in room with family members, agreeable to PT      Objective       08/23/19 1431   Precautions   Precautions Fall Risk   Comments Telugu speaking, dizziness   Standing Lower Body Exercises   Hip Extension 1 set of 15   Hip Abduction 1 set of 15   Marching 1 set of 15   Heel Rise 1 set of 15   Toe Rise 1 set of 15   Mini Squat 1 set of 15   Other Exercises Above exercises completed in // bars with BUE support   Neuro-Muscular Treatments   Neuro-Muscular Treatments Sequencing;Postural Changes;Weight Shift Right;Weight Shift Left   Comments Balance/ coordination activities in // bars: ring toss completed on air-ex with single UE support and solid ground with no UE support, both RUE and LUE used for throwing; stepping over bolsters and onto air-ex with and without UE support. All with CGA- SBA , 25 min total with ~10 min standing tolerance before need for rest break.    Interdisciplinary Plan of Care Collaboration   IDT Collaboration with  Family / Caregiver   Patient Position at End of Therapy Seated;Call Light within Reach;Family / Friend in Room   Collaboration Comments pt's son present for PT session, family members in room after PT session    PT Total Time Spent   PT Individual Total Time Spent (Mins) 60   PT Charge Group   PT Gait Training 1   PT Therapeutic Exercise 1   PT Neuromuscular Re-Education / Balance 2     FIM Toilet Transfer Score:  5 - Standby Prompting/Supervision or Set-up  Toilet Transfer Description:  Grab bar, Increased time, Supervision for safety, Set-up of equipment(WC <> toilet, SBA with use of grab bar)    FIM Walking Score:  4 - Minimal Assistance  Walking Description:  Extra time, Verbal cueing, Requires incidental assist,  Walker(~600 ft and 200 ft with FWW and SBA- CGA)      Assessment    Pt fatigued ~40 minutes into session. Participatory with encouragement from son and PT. Good strength and decreased L lateral lean today.     Plan    Progress gait, standing balance, reinforce safety with functional mobility, family training for guarding, safety, and transfers, complete standardized balance assessment

## 2019-08-23 NOTE — REHAB-CM IDT TEAM NOTE
Case Management    DC Planning  DC destination/dispostion:  Single level home, 2 entry steps in [place], [place] with son and daughter-in-law.     Referrals: None at this time.    DC Needs: Needs PCP. Anticipate home health care. DME for mobility and safety - has no DME.    Barriers to discharge:   Functional deficits. Primary language is Montenegrin.    Strengths: Independent PLOF; motivated; supportive family    Section completed by:  Tracey Quintanilla R.N.

## 2019-08-23 NOTE — DISCHARGE PLANNING
CASE MANAGEMENT INITIAL ASSESSMENT    Admit Date:  8/21/2019     I met with patient, her sons to discuss role of case management / discharge planning / team conference.   Patient is a  69 y.o. female transferred from [facility]. She was inpatient 8/18 to 8/21/2019.   Patient was admitted to [facility] on 8/21/2019.  The admitting physician is Dr. Tereza Henderson.     Diagnosis: 0.1.1 (l) body involvment (r) brain  Stroke (cerebrum) ([facility])    Co-morbidities:   Patient Active Problem List    Diagnosis Date Noted   • Stroke, lacunar ([facility]) 08/19/2019     Priority: High   • Elevated troponin 08/19/2019     Priority: Medium   • Type 2 diabetes mellitus with hyperglycemia, without long-term current use of insulin ([facility]) 08/19/2019   • Essential hypertension 08/19/2019     Prior Living Situation:  Housing / Facility: [address]  Lives with - Patient's Self Care Capacity: Adult Children    Prior Level of Function:  Medication Management: Independent  Finances: Independent  Home Management: Independent  Shopping: Independent  Prior Level Of Mobility: Independent Without Device in Community  Driving / Transportation: Relatives / Others Provide Transportation    Support Systems:  Primary : Dileep Johnson [phone]  Other support systems: Daughter-in-law Yaw Mendiola son [phone]     Previous Services Utilized:   Equipment Owned: None  Prior Services: Home-Independent    Other Information:  Occupation (Pre-Hospital Vocational): Retired Due To Age     Primary Payor Source: Medicare A  Primary Care Practitioner : Needs one  Other MDs: Dr. Galloway, Neurology    Patient / Family Goal:  Patient / Family Goal: To get stronger to return home  DC Disposition: Single level home, 2 entry steps in [place], [place] with son and daughter-in-law.   DC Needs: Needs PCP. Anticipate home health care. DME for mobility and safety - has no DME.  Strengths: Independent PLOF; motivated; supportive  family    Additional Case Management Questions:  Have you ever received case management services for yourself or a family member? no    Do you feel you have and an understanding of what services  provide? yes    Do you have any additional questions regarding case management?    no      CASE MANAGEMENT PLAN OF CARE   Individualized Goals:   1. Improve functional status to return home with family support  2. Establish with PCP  3. Patient and family knowledgeable of post acute care plan and aware how to proceed    Barriers:   1. Functional deficits  2. Primary language is Belarusian    Plan:  1. Continue to follow patient through hospitalization and provide discharge planning in collaboration with patient, family, physicians and ancillary services.     2. Utilize community resources to ensure a safe discharge.

## 2019-08-23 NOTE — THERAPY
Occupational Therapy  Daily Treatment     Patient Name: Noemi Lopez  Age:  69 y.o., Sex:  female  Medical Record #: [id number]  Today's Date: 8/23/2019     Precautions  Precautions: Fall Risk  Comments: Faroese speaking    Safety   ADL Safety : Requires Supervision for Safety  Bathroom Safety: Requires Supervision for Safety    Subjective    Patient agreeable to participate in OT. Family present (granddaughter and son).      Objective       08/23/19 0831   Precautions   Precautions Fall Risk   Comments Faroese speaking, diplopia, dizziness   Safety    ADL Safety  Requires Supervision for Safety   Bathroom Safety Requires Supervision for Safety   Cognition    Orientation Level Oriented x 4   Level of Consciousness Alert   IADL Treatments   Kitchen Mobility Education Patient retrieved cones from varying heights/locations in kitchen (ie cabinets, refrigerator, drawers, microwave) with supervision while using FWW. No LOB noted.    Sitting Lower Body Exercises   Sit to Stand 2 sets of 10   Standing Lower Body Exercises   Marching 2 sets of 10   Balance   Sitting Balance (Static) Fair +   Sitting Balance (Dynamic) Fair   Interdisciplinary Plan of Care Collaboration   IDT Collaboration with  Family / Caregiver;Physician   Patient Position at End of Therapy Seated;Self Releasing Lap Belt Applied   Collaboration Comments Family present, CLOF    OT Total Time Spent   OT Individual Total Time Spent (Mins) 60   OT Charge Group   OT Self Care / ADL 3   OT Neuromuscular Re-education / Balance 1       FIM Grooming Score:  5 - Standby Prompting/Supervision or Set-up  Grooming Description:  Increased time, Set-up of equipment    FIM Bathing Score:  5 - Standby Prompting/Supervision or Set-up  Bathing Description:   FIM Bathing Score:  5 - Standby Prompting/Supervision or Set-up  Bathing Description:  Grab bar, Tub bench, Hand rails, Hand held shower, Increased time, Supervision for safety, Set-up of equipment(Patient  washed, rinsed and dried all body parts with supervision for safety and standing balance)    FIM Upper Body Dressin - Standby Prompting/Supervision or Set-up  Upper Body Dressing Description:  Increased time, Set-up of equipment(Pt donned bra and blouse with set-up while seated in w/c)    FIM Lower Body Dressing Score:  5 - Standby Prompting/Supervsion or Set-up  Lower Body Dressing Description:  Increased time, Supervision for safety, Set-up of equipment(Patient donned underwear, pants, and non skid socks with supervision for standing balance and safety.  Used grab bar for steadying assist )    FIM Tub/Shower Transfers Score:  5 - Standby Prompting/Supervision or Set-up  Tub/Shower Transfers Description:  Grab bar, Increased time, Supervision for safety, Set-up of equipment      Assessment    Patient tolerated OT session well with focus on ADLs and functional mobility/balance. Demonstrates good safety awareness and no impulsivity during ADLs and functional mobility tasks. No LOB noted during kitchen mobility task.     Plan    ADL and IADLs, functional mobility, standing balance, endurance, visual perceptual activities

## 2019-08-23 NOTE — REHAB-PHARMACY IDT TEAM NOTE
Pharmacy   Pharmacy  Antibiotics/Antifungals/Antivirals:  Medication:      Active Orders (From admission, onward)    None        Route:        NA  Stop Date:  NA  Reason:      NA  Antihypertensives/Cardiac:  Medication:    Orders (72h ago, onward)     Start     Ordered    08/22/19 0600  enalapril (VASOTEC) tablet 5 mg  Q DAY      08/21/19 1653    08/21/19 2100  atorvastatin (LIPITOR) tablet 80 mg  EVERY EVENING      08/21/19 1101    08/21/19 1101  hydrALAZINE (APRESOLINE) tablet 10 mg  EVERY 8 HOURS PRN      08/21/19 1101              Patient Vitals for the past 24 hrs:   BP Pulse   08/23/19 0640 124/66 74   08/23/19 0510 120/70 --   08/22/19 1850 127/68 85     Anticoagulation:  Medication: Aspirin      Other key medications: A review of the medication list reveals no issues at this time. Patient is currently on antihypertensive(s). Recommend home blood pressure monitoring/recording if antihypertensive(s) regimen(s) continue. Patient is currently on diabetic medication(s) and/or Insulin(s). Recommend home blood glucose monitoring/recording if these regimen(s) continue.    Section completed by: Jason Osborn [facility]

## 2019-08-23 NOTE — THERAPY
Occupational Therapy  Daily Treatment     Patient Name: Noemi Lopez  Age:  69 y.o., Sex:  female  Medical Record #: [id number]  Today's Date: 8/23/2019     Precautions  Precautions: (P) Fall Risk  Comments: (P) Guatemalan speaking    Safety   ADL Safety : (P) Requires Supervision for Safety  Bathroom Safety: (P) Requires Supervision for Safety    Subjective    Pt was supine in bed with family present. Pt was agreeable to therapy and grandson helped to translate     Objective       08/23/19 1031   Precautions   Precautions Fall Risk   Comments Guatemalan speaking   Safety    ADL Safety  Requires Supervision for Safety   Bathroom Safety Requires Supervision for Safety   Vision Screen   Visual Acuity   (Able to locate buttons on dynavision from 2.5 feet distance)   Passive ROM Upper Body   Passive ROM Upper Body WDL   Active ROM Upper Body   Active ROM Upper Body  WDL   Strength Upper Body   Upper Body Strength  Not Tested   Balance   Sitting Balance (Static) Fair +   Sitting Balance (Dynamic) Fair   Standing Balance (Static) Fair   Standing Balance (Dynamic) Fair   Weight Shift Sitting Good   Weight Shift Standing Fair   Skilled Intervention Verbal Cuing   Bed Mobility    Supine to Sit Stand by Assist   Dynavision   Patient Position Sitting   Application Bilateral integration;Visual-motor integration   Mode B   Time per trial (sec) 60   Number of Digits   (Trial 1: 38/38, Trial 2: 46/46)   Digit Speed (sec) 3   Number of Rings 3   Quadrants LLQ;RLQ   Number of Digits Correct 38  (38/38 trial one, 46/46 trial 2. Mode A: 40 then 42 )   Number of Hits 38  (38/38, 46/46, Mode A: 40,42)   Average Reaction Time 1.46   Clinical Observations Non-dominant hand is neglected;Seated balance intact;Standing balance impaired  (Pt required verbal cues to use LUE)   Interdisciplinary Plan of Care Collaboration   IDT Collaboration with  Family / Caregiver   Patient Position at End of Therapy Seated;Self Releasing Lap Belt  Applied;Family / Friend in Room;Tray Table within Reach;Call Light within Reach   Collaboration Comments pt son, wife and children present during session   OT Total Time Spent   OT Individual Total Time Spent (Mins) 30   OT Charge Group   OT Neuromuscular Re-education / Balance 2     Pt sat in w/c for Mode B activities on Dynavision and stood with FWW and CGA-SBA for balance during Mode A activities for 8 minutes. Pt took rest breaks in w/c between trials     Assessment    Pt tolerated session well. Pt demonstrated improved balance and visual scanning to both directions. Pt demonstrated reaction time WFL on the Dynavision. Pt was able to see the lights on the Dynavision from 2.5 feet without wearing patch or diplopia. Pt was observed leaning on the Dynavision for balance, however, was able to hold her balance without device with SBA for safety with verbal cues.     Plan    ADL and IADLs, functional mobility, standing balance, endurance, visual perceptual activities

## 2019-08-23 NOTE — CONSULTS
HOSPITAL MEDICINE CONSULTATION    Requesting Physician:  Dr. Henderson    Reason for Consult:  Hypertension, Diabetes    History of Present Illness:  The patient is a 69-year-old right handed  female who is Icelandic speaking only with past medical history significant for hypertension and diabetes.  She was admitted to [facility] on 8/18/19 for dizziness and nausea with vomiting.  MR imaging revealed acute lacunar infarcts in the right paramedian dorsal midbrain and right thalamus.  CT angiogram of the head and neck were unremarkable.  Echocardiogram showed ejection fraction 70%.  She was placed on Aspirin and statin therapy.  Due to her ongoing functional debility, the patient was transferred to [facility] on 8/21/19.  Hospital Medicine consultation is requested to assist in the management of this patient's HTN and DM    Review of Systems:  Review of Systems   Constitutional: Negative for chills and fever.   HENT: Negative.    Eyes: Negative.    Respiratory: Negative for cough and shortness of breath.    Cardiovascular: Negative for chest pain and palpitations.   Gastrointestinal: Negative for abdominal pain, nausea and vomiting.   Genitourinary: Negative.    Musculoskeletal: Negative.    Skin: Negative for itching and rash.   Neurological: Positive for dizziness.       Allergies:  Allergies   Allergen Reactions   • Pcn [Penicillins]    • Thiamine        Medications:    Current Facility-Administered Medications:   •  metFORMIN (GLUCOPHAGE) tablet 500 mg, 500 mg, Oral, BID WITH MEALS, Anais Carlos M.D.  •  magnesium oxide (MAG-OX) tablet 400 mg, 400 mg, Oral, DAILY, Anais Carlos M.D., 400 mg at 08/24/19 0802  •  insulin regular (HUMULIN R) injection 2-12 Units, 2-12 Units, Subcutaneous, 4X/DAY ACHS, 10 Units at 08/24/19 1111 **AND** Accu-Chek ACHS, , , Q AC AND BEDTIME(S) **AND** glucose 4 g chewable tablet 16 g, 16 g, Oral, Q15 MIN PRN **AND** dextrose 50% (D50W) injection 50  mL, 50 mL, Intravenous, Q15 MIN PRN, Anais Carlos M.D.  •  vitamin D (cholecalciferol) tablet 2,000 Units, 2,000 Units, Oral, DAILY, Tereza Henderson M.D., 2,000 Units at 08/24/19 0802  •  Respiratory Care per Protocol, , Nebulization, Continuous RT, Tereza Henderson M.D.  •  Pharmacy Consult Request ...Pain Management Review 1 Each, 1 Each, Other, PHARMACY TO DOSE, Tereza Henderson M.D.  •  acetaminophen (TYLENOL) tablet 650 mg, 650 mg, Oral, Q4HRS PRN, Tereza Henderson M.D.  •  hydrALAZINE (APRESOLINE) tablet 10 mg, 10 mg, Oral, Q8HRS PRN, Tereza Henderson M.D.  •  artificial tears ophthalmic solution 1 Drop, 1 Drop, Both Eyes, PRN, Tereza Henderson M.D.  •  benzocaine-menthol (CEPACOL) lozenge 1 Lozenge, 1 Lozenge, Mouth/Throat, Q2HRS PRN, Tereza Henderson M.D.  •  mag hydrox-al hydrox-simeth (MAALOX PLUS ES or MYLANTA DS) suspension 20 mL, 20 mL, Oral, Q2HRS PRN, Tereza Henderson M.D.  •  ondansetron (ZOFRAN ODT) dispertab 4 mg, 4 mg, Oral, 4X/DAY PRN **OR** ondansetron (ZOFRAN) syringe/vial injection 4 mg, 4 mg, Intramuscular, 4X/DAY PRN, Tereza Henderson M.D.  •  traZODone (DESYREL) tablet 50 mg, 50 mg, Oral, QHS PRN, Tereza Henderson M.D.  •  sodium chloride (OCEAN) 0.65 % nasal spray 2 Spray, 2 Spray, Nasal, PRN, Tereza Henderson M.D.  •  hydrOXYzine HCl (ATARAX) tablet 50 mg, 50 mg, Oral, Q6HRS PRN, Tereza J. Beatriz, M.D.  •  melatonin tablet 3 mg, 3 mg, Oral, HS PRN, Tereza Henderson M.D.  •  enoxaparin (LOVENOX) inj 40 mg, 40 mg, Subcutaneous, DAILY, Tereza Henderson M.D., 40 mg at 08/24/19 0804  •  senna-docusate (PERICOLACE or SENOKOT S) 8.6-50 MG per tablet 2 Tab, 2 Tab, Oral, BID, 2 Tab at 08/23/19 0803 **AND** polyethylene glycol/lytes (MIRALAX) PACKET 1 Packet, 1 Packet, Oral, QDAY PRN **AND** magnesium hydroxide (MILK OF MAGNESIA) suspension 30 mL, 30 mL, Oral, QDAY PRN **AND** bisacodyl (DULCOLAX) suppository 10 mg, 10 mg, Rectal, QDAY PRN, Tereza Henderson M.D.  •   atorvastatin (LIPITOR) tablet 80 mg, 80 mg, Oral, Q EVENING, Tereza Henderson M.D., 80 mg at 08/23/19 2051  •  aspirin EC (ECOTRIN) tablet 81 mg, 81 mg, Oral, DAILY, Tereza Henderson M.D., 81 mg at 08/24/19 0802  •  SITagliptin (JANUVIA) tablet 25 mg, 25 mg, Oral, DAILY, Tereza Henderson M.D., 25 mg at 08/24/19 0802  •  scopolamine (TRANSDERM-SCOP) patch 1 Patch, 1 Patch, Transdermal, Q72HRS, Tereza Henderson M.D., 1 Patch at 08/21/19 1737  •  enalapril (VASOTEC) tablet 5 mg, 5 mg, Oral, Q DAY, Tereza Henderson M.D., 5 mg at 08/24/19 0610    Past Medical/Surgical History:  Past Medical History:   Diagnosis Date   • Diabetes (HCC)    • Hypertension      History reviewed. No pertinent surgical history.    Social History:  Social History     Socioeconomic History   • Marital status:      Spouse name: Not on file   • Number of children: Not on file   • Years of education: Not on file   • Highest education level: Not on file   Occupational History   • Not on file   Social Needs   • Financial resource strain: Not on file   • Food insecurity:     Worry: Not on file     Inability: Not on file   • Transportation needs:     Medical: Not on file     Non-medical: Not on file   Tobacco Use   • Smoking status: Never Smoker   • Smokeless tobacco: Never Used   Substance and Sexual Activity   • Alcohol use: Never     Frequency: Never   • Drug use: Never   • Sexual activity: Not on file   Lifestyle   • Physical activity:     Days per week: Not on file     Minutes per session: Not on file   • Stress: Not on file   Relationships   • Social connections:     Talks on phone: Not on file     Gets together: Not on file     Attends Alevism service: Not on file     Active member of club or organization: Not on file     Attends meetings of clubs or organizations: Not on file     Relationship status: Not on file   • Intimate partner violence:     Fear of current or ex partner: Not on file     Emotionally abused: Not on file      Physically abused: Not on file     Forced sexual activity: Not on file   Other Topics Concern   • Not on file   Social History Narrative   • Not on file       Family History  Family History   Problem Relation Age of Onset   • Diabetes Child        Physical Examination:   Vitals:    08/23/19 1400 08/23/19 1908 08/24/19 0700 08/24/19 1400   BP: 106/63 120/62 136/71 134/76   Pulse: 89 88 70 82   Resp: 16 18 18 18   Temp: 36.4 °C (97.5 °F) 37.1 °C (98.8 °F) 36.6 °C (97.8 °F) 36.7 °C (98.1 °F)   TempSrc: Temporal Oral Oral Oral   SpO2: 95%      Weight:       Height:           Physical Exam   Constitutional: She is oriented to person, place, and time. No distress.   HENT:   Head: Normocephalic and atraumatic.   Right Ear: External ear normal.   Left Ear: External ear normal.   Eyes: Conjunctivae and EOM are normal. Right eye exhibits no discharge. Left eye exhibits no discharge.   Neck: Normal range of motion. Neck supple. No tracheal deviation present.   Cardiovascular: Normal rate and regular rhythm.   Pulmonary/Chest: Effort normal and breath sounds normal. No respiratory distress. She has no wheezes.   Abdominal: Soft. Bowel sounds are normal. She exhibits no distension. There is no tenderness.   Musculoskeletal: She exhibits no edema or tenderness.   Neurological: She is alert and oriented to person, place, and time.   Skin: Skin is warm and dry. She is not diaphoretic.   Vitals reviewed.      Laboratory Data:  Recent Labs     08/22/19  0553   WBC 7.7   RBC 4.37   HEMOGLOBIN 13.7   HEMATOCRIT 41.6   MCV 95.2   MCH 31.4   MCHC 32.9*   RDW 52.9*   PLATELETCT 155*   MPV 10.7     Recent Labs     08/22/19  0553   SODIUM 139   POTASSIUM 3.9   CHLORIDE 108   CO2 22   GLUCOSE 174*   BUN 20   CREATININE 0.63   CALCIUM 8.8       Imaging:  No orders to display       Impressions/Recommendations:  Stroke, lacunar (HCC)  On ASA and Lipitor  Scopolamine patch for dizziness    Type 2 diabetes mellitus with hyperglycemia, without  long-term current use of insulin (HCC)  HbA1c 9.8  Continue Januvia  Adjust SSI    Essential hypertension  Observe blood pressure trends on Enalapril    Hypomagnesemia  Start MgOx    Vitamin D deficiency  Vit D level 20  On supplementation    Full Code    Reviewed with patient and family.  Thank you for the opportunity to assist in this patient's care.  We will continue to follow along with you.

## 2019-08-23 NOTE — PROGRESS NOTES
Rehab Progress Note     Date of Service: 8/23/2019  Chief Complaint: follow up stroke    Interval Events (Subjective)    Patient is seen and examined during her occupational therapy session today.  She is working on finding objects in the kitchen.  She continues to report improvement in her dizziness and double vision.  As usual multiple family members are present.  She denies any headache or pain.  I advised the patient and family Monday will be in pick a discharge date but she will likely not need a long length of stay due to her mild stroke deficits as well as her great family support.    Objective:  VITAL SIGNS: /63   Pulse 89   Temp 36.4 °C (97.5 °F) (Temporal)   Resp 16   Ht 1.524 m (5')   Wt 68 kg (149 lb 14.6 oz)   SpO2 95%   BMI 29.28 kg/m²   Gen: alert, no apparent distress  CV: regular rate and rhythm, no murmurs, no peripheral edema  Resp: clear to ascultation bilaterally, normal respiratory effort  GI: soft, non-tender abdomen, bowel sounds present  Neuro: notable for double vision, nystagmus    Recent Results (from the past 72 hour(s))   ACCU-CHEK GLUCOSE    Collection Time: 08/20/19  5:50 PM   Result Value Ref Range    Glucose - Accu-Ck 230 (H) 65 - 99 mg/dL   ACCU-CHEK GLUCOSE    Collection Time: 08/20/19  9:11 PM   Result Value Ref Range    Glucose - Accu-Ck 306 (H) 65 - 99 mg/dL   ACCU-CHEK GLUCOSE    Collection Time: 08/21/19  8:01 AM   Result Value Ref Range    Glucose - Accu-Ck 152 (H) 65 - 99 mg/dL   ACCU-CHEK GLUCOSE    Collection Time: 08/21/19 11:39 AM   Result Value Ref Range    Glucose - Accu-Ck 295 (H) 65 - 99 mg/dL   ACCU-CHEK GLUCOSE    Collection Time: 08/21/19  5:05 PM   Result Value Ref Range    Glucose - Accu-Ck 156 (H) 65 - 99 mg/dL   ACCU-CHEK GLUCOSE    Collection Time: 08/21/19  9:54 PM   Result Value Ref Range    Glucose - Accu-Ck 198 (H) 65 - 99 mg/dL   CBC with Differential    Collection Time: 08/22/19  5:53 AM   Result Value Ref Range    WBC 7.7 4.8 - 10.8 K/uL     RBC 4.37 4.20 - 5.40 M/uL    Hemoglobin 13.7 12.0 - 16.0 g/dL    Hematocrit 41.6 37.0 - 47.0 %    MCV 95.2 81.4 - 97.8 fL    MCH 31.4 27.0 - 33.0 pg    MCHC 32.9 (L) 33.6 - 35.0 g/dL    RDW 52.9 (H) 35.9 - 50.0 fL    Platelet Count 155 (L) 164 - 446 K/uL    MPV 10.7 9.0 - 12.9 fL    Neutrophils-Polys 45.00 44.00 - 72.00 %    Lymphocytes 43.20 (H) 22.00 - 41.00 %    Monocytes 7.10 0.00 - 13.40 %    Eosinophils 4.00 0.00 - 6.90 %    Basophils 0.40 0.00 - 1.80 %    Immature Granulocytes 0.30 0.00 - 0.90 %    Nucleated RBC 0.00 /100 WBC    Neutrophils (Absolute) 3.48 2.00 - 7.15 K/uL    Lymphs (Absolute) 3.34 1.00 - 4.80 K/uL    Monos (Absolute) 0.55 0.00 - 0.85 K/uL    Eos (Absolute) 0.31 0.00 - 0.51 K/uL    Baso (Absolute) 0.03 0.00 - 0.12 K/uL    Immature Granulocytes (abs) 0.02 0.00 - 0.11 K/uL    NRBC (Absolute) 0.00 K/uL   Comp Metabolic Panel (CMP)    Collection Time: 08/22/19  5:53 AM   Result Value Ref Range    Sodium 139 135 - 145 mmol/L    Potassium 3.9 3.6 - 5.5 mmol/L    Chloride 108 96 - 112 mmol/L    Co2 22 20 - 33 mmol/L    Anion Gap 9.0 0.0 - 11.9    Glucose 174 (H) 65 - 99 mg/dL    Bun 20 8 - 22 mg/dL    Creatinine 0.63 0.50 - 1.40 mg/dL    Calcium 8.8 8.5 - 10.5 mg/dL    AST(SGOT) 18 12 - 45 U/L    ALT(SGPT) 20 2 - 50 U/L    Alkaline Phosphatase 72 30 - 99 U/L    Total Bilirubin 0.6 0.1 - 1.5 mg/dL    Albumin 3.4 3.2 - 4.9 g/dL    Total Protein 6.2 6.0 - 8.2 g/dL    Globulin 2.8 1.9 - 3.5 g/dL    A-G Ratio 1.2 g/dL   Magnesium    Collection Time: 08/22/19  5:53 AM   Result Value Ref Range    Magnesium 1.6 1.5 - 2.5 mg/dL   Vitamin D, 25-hydroxy (blood)    Collection Time: 08/22/19  5:53 AM   Result Value Ref Range    25-Hydroxy   Vitamin D 25 20 (L) 30 - 100 ng/mL   ESTIMATED GFR    Collection Time: 08/22/19  5:53 AM   Result Value Ref Range    GFR If African American >60 >60 mL/min/1.73 m 2    GFR If Non African American >60 >60 mL/min/1.73 m 2   ACCU-CHEK GLUCOSE    Collection Time: 08/22/19   8:15 AM   Result Value Ref Range    Glucose - Accu-Ck 197 (H) 65 - 99 mg/dL   ACCU-CHEK GLUCOSE    Collection Time: 08/22/19 10:55 AM   Result Value Ref Range    Glucose - Accu-Ck 380 (H) 65 - 99 mg/dL   ACCU-CHEK GLUCOSE    Collection Time: 08/22/19  5:24 PM   Result Value Ref Range    Glucose - Accu-Ck 133 (H) 65 - 99 mg/dL   ACCU-CHEK GLUCOSE    Collection Time: 08/22/19  8:50 PM   Result Value Ref Range    Glucose - Accu-Ck 280 (H) 65 - 99 mg/dL   ACCU-CHEK GLUCOSE    Collection Time: 08/23/19  7:56 AM   Result Value Ref Range    Glucose - Accu-Ck 170 (H) 65 - 99 mg/dL   ACCU-CHEK GLUCOSE    Collection Time: 08/23/19 11:20 AM   Result Value Ref Range    Glucose - Accu-Ck 256 (H) 65 - 99 mg/dL       Current Facility-Administered Medications   Medication Frequency   • magnesium oxide (MAG-OX) tablet 400 mg DAILY   • insulin regular (HUMULIN R) injection 2-12 Units 4X/DAY ACHS    And   • glucose 4 g chewable tablet 16 g Q15 MIN PRN    And   • dextrose 50% (D50W) injection 50 mL Q15 MIN PRN   • vitamin D (cholecalciferol) tablet 2,000 Units DAILY   • Respiratory Care per Protocol Continuous RT   • Pharmacy Consult Request ...Pain Management Review 1 Each PHARMACY TO DOSE   • acetaminophen (TYLENOL) tablet 650 mg Q4HRS PRN   • hydrALAZINE (APRESOLINE) tablet 10 mg Q8HRS PRN   • artificial tears ophthalmic solution 1 Drop PRN   • benzocaine-menthol (CEPACOL) lozenge 1 Lozenge Q2HRS PRN   • mag hydrox-al hydrox-simeth (MAALOX PLUS ES or MYLANTA DS) suspension 20 mL Q2HRS PRN   • ondansetron (ZOFRAN ODT) dispertab 4 mg 4X/DAY PRN    Or   • ondansetron (ZOFRAN) syringe/vial injection 4 mg 4X/DAY PRN   • traZODone (DESYREL) tablet 50 mg QHS PRN   • sodium chloride (OCEAN) 0.65 % nasal spray 2 Spray PRN   • hydrOXYzine HCl (ATARAX) tablet 50 mg Q6HRS PRN   • melatonin tablet 3 mg HS PRN   • enoxaparin (LOVENOX) inj 40 mg DAILY   • senna-docusate (PERICOLACE or SENOKOT S) 8.6-50 MG per tablet 2 Tab BID    And   •  polyethylene glycol/lytes (MIRALAX) PACKET 1 Packet QDAY PRN    And   • magnesium hydroxide (MILK OF MAGNESIA) suspension 30 mL QDAY PRN    And   • bisacodyl (DULCOLAX) suppository 10 mg QDAY PRN   • atorvastatin (LIPITOR) tablet 80 mg Q EVENING   • aspirin EC (ECOTRIN) tablet 81 mg DAILY   • SITagliptin (JANUVIA) tablet 25 mg DAILY   • scopolamine (TRANSDERM-SCOP) patch 1 Patch Q72HRS   • enalapril (VASOTEC) tablet 5 mg Q DAY       Orders Placed This Encounter   Procedures   • Diet Order Diabetic     Standing Status:   Standing     Number of Occurrences:   1     Order Specific Question:   Diet:     Answer:   Diabetic [3]     Order Specific Question:   Consistency/Fluid modifications:     Answer:   Thin Liquids [3]       Assessment:  Active Hospital Problems    Diagnosis   • *Stroke, lacunar (HCC)   • Elevated troponin   • Type 2 diabetes mellitus with hyperglycemia, without long-term current use of insulin (HCC)   • Essential hypertension     This patient is a 69 y.o. female admitted for acute inpatient rehabilitation with Stroke, lacunar (HCC).    Therapy update 8/23/2019  Supervision eating  Supervision grooming  Supervision bathing  Supervision upper body dressing  Supervision lower body dressing  Supervision toileting  Min assistbladder  Min assist bowel  Min assist bed/chair transfer  Supervision toilet transfer  Min assist tub/shower transfer  Min assist ambulation  Supervision wheelchair propulsion  Max assist stairs  Modified Independent comprehension  Modified Independent expression  Modified Independent social interaction  Min assist problem solving  Min assist memory    We will have her first weekly conference on Monday to discuss a discharge date.    Medical Decision Making and Plan:    Right paramedial midbrain/thalmic stroke  Non-dominant  Double vision  Nystagmus  Continue full rehab program  PT/OT, 1.5 hr each discipline, 5 days per week  SLP eval without cognitive deficits  Continue aspirin and  statin for secondary stroke prophylaxis  Eye patch PRN     Dizziness  Continue scopolamine patch  PRN Zofran     Hypertension  Continue Enalapril  Appreciate hospitalist assistance     Diabetes  Continue Januvia   Continue SSI  Appreciate hospitalist assistance    Vit D insufficiency  Continue supplementation    DVT prophylaxis  Lovenox    Total time:  15 minutes.  I spent greater than 50% of the time for patient care, counseling, and coordination on this date, including patient face-to face time, unit/floor time with review of records/pertinent lab data and studies, as well as discussing diagnostic evaluation/work up, planned therapeutic interventions, and future disposition of care, as per the interval events/subjective and the assessment and plan as noted above.    I have performed a physical exam, reviewed and updated ROS, as well as the assessment and plan today 8/23/2019. In review of note from 8/22/2019 there are no new changes except as documented above.            Tereza Henderson M.D.   Physical Medicine and Rehabilitation

## 2019-08-23 NOTE — CARE PLAN
Problem: Safety  Goal: Will remain free from falls  Intervention: Implement fall precautions  Note:   Use of call light encouraged to make needs known.Bed in low position, non skid socks/shoes on when out of bed.Alarms in place for safety.Daughter at bedside.Call light within reach.Will continue to monitor and assess needs and safety.     Problem: GLYCEMIA IMBALANCE  Goal: Clinical indication of glycemia balance is achieved  Intervention: MONITOR BLOOD GLUCOSE LEVELS AS ORDERED  Note:   FSBS 280 at hs, coverage given with snack.Will continue to monitor and assess for s/sx of hyper/hypoglycemia.

## 2019-08-24 PROBLEM — E55.9 VITAMIN D DEFICIENCY: Status: ACTIVE | Noted: 2019-08-24

## 2019-08-24 PROBLEM — E83.42 HYPOMAGNESEMIA: Status: ACTIVE | Noted: 2019-08-24

## 2019-08-24 LAB
GLUCOSE BLD-MCNC: 109 MG/DL (ref 65–99)
GLUCOSE BLD-MCNC: 165 MG/DL (ref 65–99)
GLUCOSE BLD-MCNC: 222 MG/DL (ref 65–99)
GLUCOSE BLD-MCNC: 391 MG/DL (ref 65–99)

## 2019-08-24 PROCEDURE — 700111 HCHG RX REV CODE 636 W/ 250 OVERRIDE (IP): Performed by: PHYSICAL MEDICINE & REHABILITATION

## 2019-08-24 PROCEDURE — 700102 HCHG RX REV CODE 250 W/ 637 OVERRIDE(OP): Performed by: HOSPITALIST

## 2019-08-24 PROCEDURE — 700102 HCHG RX REV CODE 250 W/ 637 OVERRIDE(OP): Performed by: PHYSICAL MEDICINE & REHABILITATION

## 2019-08-24 PROCEDURE — 99232 SBSQ HOSP IP/OBS MODERATE 35: CPT | Performed by: HOSPITALIST

## 2019-08-24 PROCEDURE — 770010 HCHG ROOM/CARE - REHAB SEMI PRIVAT*

## 2019-08-24 PROCEDURE — 82962 GLUCOSE BLOOD TEST: CPT | Mod: 91

## 2019-08-24 PROCEDURE — A9270 NON-COVERED ITEM OR SERVICE: HCPCS | Performed by: PHYSICAL MEDICINE & REHABILITATION

## 2019-08-24 PROCEDURE — A9270 NON-COVERED ITEM OR SERVICE: HCPCS | Performed by: HOSPITALIST

## 2019-08-24 RX ADMIN — ENALAPRIL MALEATE 5 MG: 5 TABLET ORAL at 06:10

## 2019-08-24 RX ADMIN — VITAMIN D, TAB 1000IU (100/BT) 2000 UNITS: 25 TAB at 08:02

## 2019-08-24 RX ADMIN — MAGNESIUM OXIDE TAB 400 MG (241.3 MG ELEMENTAL MG) 400 MG: 400 (241.3 MG) TAB at 08:02

## 2019-08-24 RX ADMIN — ENOXAPARIN SODIUM 40 MG: 100 INJECTION SUBCUTANEOUS at 08:04

## 2019-08-24 RX ADMIN — SCOPALAMINE 1 PATCH: 1 PATCH, EXTENDED RELEASE TRANSDERMAL at 17:42

## 2019-08-24 RX ADMIN — METFORMIN HYDROCHLORIDE 500 MG: 500 TABLET, FILM COATED ORAL at 17:38

## 2019-08-24 RX ADMIN — ATORVASTATIN CALCIUM 80 MG: 40 TABLET, FILM COATED ORAL at 20:52

## 2019-08-24 RX ADMIN — ASPIRIN 81 MG: 81 TABLET, COATED ORAL at 08:02

## 2019-08-24 RX ADMIN — SITAGLIPTIN 25 MG: 50 TABLET, FILM COATED ORAL at 08:02

## 2019-08-24 ASSESSMENT — LIFESTYLE VARIABLES
HOW MANY TIMES IN THE PAST YEAR HAVE YOU HAD 5 OR MORE DRINKS IN A DAY: 0
AVERAGE NUMBER OF DAYS PER WEEK YOU HAVE A DRINK CONTAINING ALCOHOL: 0
ON A TYPICAL DAY WHEN YOU DRINK ALCOHOL HOW MANY DRINKS DO YOU HAVE: 0
EVER FELT BAD OR GUILTY ABOUT YOUR DRINKING: NO
TOTAL SCORE: 0
TOTAL SCORE: 0
ALCOHOL_USE: NO
TOTAL SCORE: 0
EVER HAD A DRINK FIRST THING IN THE MORNING TO STEADY YOUR NERVES TO GET RID OF A HANGOVER: NO
CONSUMPTION TOTAL: NEGATIVE
HAVE YOU EVER FELT YOU SHOULD CUT DOWN ON YOUR DRINKING: NO
HAVE PEOPLE ANNOYED YOU BY CRITICIZING YOUR DRINKING: NO

## 2019-08-24 ASSESSMENT — ENCOUNTER SYMPTOMS
DIZZINESS: 1
CHILLS: 0
COUGH: 0
NAUSEA: 0
SHORTNESS OF BREATH: 0
FEVER: 0
ABDOMINAL PAIN: 0
PALPITATIONS: 0
VOMITING: 0
EYES NEGATIVE: 1
MUSCULOSKELETAL NEGATIVE: 1

## 2019-08-24 NOTE — PROGRESS NOTES
[facility] Medicine Daily Progress Note      Chief Complaint  HTN, DM    Interval Problem Update  Denies chest pain, shortness of breath, or palpitations.    Review of Systems  Review of Systems   Constitutional: Negative for chills and fever.   HENT: Negative.    Eyes: Negative.    Respiratory: Negative for cough and shortness of breath.    Cardiovascular: Negative for chest pain and palpitations.   Gastrointestinal: Negative for abdominal pain, nausea and vomiting.   Genitourinary: Negative.    Musculoskeletal: Negative.    Skin: Negative for itching and rash.   Neurological: Positive for dizziness.        Physical Exam  Temp:  [36.6 °C (97.8 °F)-37.1 °C (98.8 °F)] 36.7 °C (98.1 °F)  Pulse:  [70-88] 82  Resp:  [18] 18  BP: (120-136)/(62-76) 134/76    Physical Exam   Constitutional: No distress.   HENT:   Head: Normocephalic and atraumatic.   Right Ear: External ear normal.   Left Ear: External ear normal.   Eyes: Conjunctivae and EOM are normal. Right eye exhibits no discharge. Left eye exhibits no discharge.   Neck: Normal range of motion. Neck supple. No tracheal deviation present.   Cardiovascular: Normal rate and regular rhythm.   Pulmonary/Chest: Effort normal and breath sounds normal. No stridor. No respiratory distress. She has no wheezes.   Abdominal: Soft. Bowel sounds are normal. She exhibits no distension. There is no tenderness.   Musculoskeletal: She exhibits no edema or tenderness.   Neurological: She is alert.   Skin: Skin is warm and dry. She is not diaphoretic.   Vitals reviewed.      Fluids    Intake/Output Summary (Last 24 hours) at 8/24/2019 1604  Last data filed at 8/24/2019 1200  Gross per 24 hour   Intake 800 ml   Output --   Net 800 ml       Laboratory  Recent Labs     08/22/19  0553   WBC 7.7   RBC 4.37   HEMOGLOBIN 13.7   HEMATOCRIT 41.6   MCV 95.2   MCH 31.4   MCHC 32.9*   RDW 52.9*   PLATELETCT 155*   MPV 10.7     Recent Labs     08/22/19  0553   SODIUM 139   POTASSIUM 3.9   CHLORIDE 108    CO2 22   GLUCOSE 174*   BUN 20   CREATININE 0.63   CALCIUM 8.8                   Assessment/Plan  * Stroke, lacunar (HCC)- (present on admission)  Assessment & Plan  On ASA and Lipitor  Scopolamine patch for dizziness    Vitamin D deficiency  Assessment & Plan  Vit D level 20  On supplementation    Hypomagnesemia  Assessment & Plan  Continue MgOx supplements    Essential hypertension- (present on admission)  Assessment & Plan  Observe blood pressure trends on Enalapril    Type 2 diabetes mellitus with hyperglycemia, without long-term current use of insulin (HCC)- (present on admission)  Assessment & Plan  HbA1c 9.8  Continue Januvia and SSI  Observe serum glucose trends     Full Code

## 2019-08-24 NOTE — PROGRESS NOTES
Patient care assumed. Report received from day RN. Patient is alert and calm, resting in chair with family at bedside. Call light and bedside table within reach.

## 2019-08-24 NOTE — CARE PLAN
Problem: Infection  Goal: Will remain free from infection  Outcome: PROGRESSING AS EXPECTED  Note:   Patient remains free from s/s infection; afebrile.  Will continue to monitor.      Problem: Respiratory:  Goal: Respiratory status will improve  Outcome: PROGRESSING AS EXPECTED  Note:   Pt has diminished lung sounds in lower lobes but remains free from SOB or increased WOB. Pt is on room air. Will continue to monitor.

## 2019-08-24 NOTE — PROGRESS NOTES
[facility] Medicine Daily Progress Note      Chief Complaint  HTN, DM    Interval Problem Update  Pt seen and examined in dining room.  Doing well.    Review of Systems  Review of Systems   Constitutional: Negative for chills and fever.   HENT: Negative.    Eyes: Negative.    Respiratory: Negative for cough and shortness of breath.    Cardiovascular: Negative for chest pain and palpitations.   Gastrointestinal: Negative for abdominal pain, nausea and vomiting.   Genitourinary: Negative.    Musculoskeletal: Negative.    Skin: Negative for itching and rash.   Neurological: Positive for dizziness.        Physical Exam  Temp:  [36.6 °C (97.8 °F)-37.1 °C (98.8 °F)] 36.7 °C (98.1 °F)  Pulse:  [70-88] 82  Resp:  [18] 18  BP: (120-136)/(62-76) 134/76    Physical Exam   Constitutional: No distress.   HENT:   Head: Normocephalic and atraumatic.   Right Ear: External ear normal.   Left Ear: External ear normal.   Eyes: Conjunctivae and EOM are normal. Right eye exhibits no discharge. Left eye exhibits no discharge.   Neck: Normal range of motion. Neck supple. No tracheal deviation present.   Cardiovascular: Normal rate and regular rhythm.   Pulmonary/Chest: Effort normal and breath sounds normal. No stridor. No respiratory distress. She has no wheezes.   Abdominal: Soft. Bowel sounds are normal. She exhibits no distension. There is no tenderness.   Musculoskeletal: She exhibits no edema or tenderness.   Neurological: She is alert.   Skin: Skin is warm and dry. She is not diaphoretic.   Vitals reviewed.      Fluids    Intake/Output Summary (Last 24 hours) at 8/24/2019 1607  Last data filed at 8/24/2019 1200  Gross per 24 hour   Intake 800 ml   Output --   Net 800 ml       Laboratory  Recent Labs     08/22/19  0553   WBC 7.7   RBC 4.37   HEMOGLOBIN 13.7   HEMATOCRIT 41.6   MCV 95.2   MCH 31.4   MCHC 32.9*   RDW 52.9*   PLATELETCT 155*   MPV 10.7     Recent Labs     08/22/19  0553   SODIUM 139   POTASSIUM 3.9   CHLORIDE 108   CO2  22   GLUCOSE 174*   BUN 20   CREATININE 0.63   CALCIUM 8.8                   Assessment/Plan  * Stroke, lacunar (HCC)- (present on admission)  Assessment & Plan  On ASA and Lipitor  Scopolamine patch for dizziness    Vitamin D deficiency  Assessment & Plan  Vit D level 20  On supplementation    Hypomagnesemia  Assessment & Plan  Continue MgOx supplements  Check F/U labs in am    Essential hypertension- (present on admission)  Assessment & Plan  Observe blood pressure trends on Enalapril    Type 2 diabetes mellitus with hyperglycemia, without long-term current use of insulin (HCC)- (present on admission)  Assessment & Plan  HbA1c 9.8  Continue Januvia and SSI  Resume outpt Metformin     Full Code

## 2019-08-24 NOTE — SENIOR ADMIT NOTE
Pt refused stool softener. Pt educated on importance of stool softener to prevent constipation. Stated she has had loose stools all day and refused medication despite education.

## 2019-08-24 NOTE — ASSESSMENT & PLAN NOTE
Hba1c: 9.8 (8/18)  On Metformin: 500 mg bid --> 750 mg bid (8/28)  Off Januvia: 25 mg daily (last dose 8/27)  Note: home med was Metformin  Cont to monitor

## 2019-08-24 NOTE — CARE PLAN
Problem: Venous Thromboembolism (VTW)/Deep Vein Thrombosis (DVT) Prevention:  Goal: Patient will participate in Venous Thrombosis (VTE)/Deep Vein Thrombosis (DVT)Prevention Measures  Outcome: PROGRESSING AS EXPECTED  Flowsheets (Taken 8/24/2019 0930)  Pharmacologic Prophylaxis Used: LMWH: Enoxaparin(Lovenox) (ASA)  Note:   Pt is on Lovenox and ASA for DVT prophylaxis. No s/s of DVT or edema; will continue to monitor.     Problem: GLYCEMIA IMBALANCE  Goal: Clinical indication of glycemia balance is achieved  Outcome: PROGRESSING AS EXPECTED  Note:   Pt now on 500 mg metformin bid in addition to sliding scale Humulin per Dr Carlos. Pt's family brought smoothie for pt today, asked permission to give it to her and expressed understanding that it would raise her blood sugar and should be a special treat. Will continue to monitor and educate.

## 2019-08-24 NOTE — CARE PLAN
Problem: Communication  Goal: The ability to communicate needs accurately and effectively will improve  Outcome: PROGRESSING AS EXPECTED  Note:   Patient able to verbalize needs.     Problem: Safety  Goal: Will remain free from injury  Outcome: PROGRESSING AS EXPECTED  Note:   Pt uses call light consistently and appropriately. Waits for assistance does not attempt self transfer this shift. Able to verbalize needs.      Problem: Bowel/Gastric:  Goal: Normal bowel function is maintained or improved  Outcome: PROGRESSING AS EXPECTED  Note:   Patient uses bathroom during bowel elimination.

## 2019-08-25 LAB
ANION GAP SERPL CALC-SCNC: 10 MMOL/L (ref 0–11.9)
BUN SERPL-MCNC: 20 MG/DL (ref 8–22)
CALCIUM SERPL-MCNC: 9.1 MG/DL (ref 8.5–10.5)
CHLORIDE SERPL-SCNC: 110 MMOL/L (ref 96–112)
CO2 SERPL-SCNC: 21 MMOL/L (ref 20–33)
CREAT SERPL-MCNC: 0.61 MG/DL (ref 0.5–1.4)
ERYTHROCYTE [DISTWIDTH] IN BLOOD BY AUTOMATED COUNT: 51.8 FL (ref 35.9–50)
GLUCOSE BLD-MCNC: 166 MG/DL (ref 65–99)
GLUCOSE BLD-MCNC: 173 MG/DL (ref 65–99)
GLUCOSE BLD-MCNC: 197 MG/DL (ref 65–99)
GLUCOSE SERPL-MCNC: 128 MG/DL (ref 65–99)
HCT VFR BLD AUTO: 43.1 % (ref 37–47)
HGB BLD-MCNC: 13.5 G/DL (ref 12–16)
MAGNESIUM SERPL-MCNC: 1.6 MG/DL (ref 1.5–2.5)
MCH RBC QN AUTO: 30.1 PG (ref 27–33)
MCHC RBC AUTO-ENTMCNC: 31.3 G/DL (ref 33.6–35)
MCV RBC AUTO: 96 FL (ref 81.4–97.8)
PLATELET # BLD AUTO: 162 K/UL (ref 164–446)
PMV BLD AUTO: 10.9 FL (ref 9–12.9)
POTASSIUM SERPL-SCNC: 4 MMOL/L (ref 3.6–5.5)
RBC # BLD AUTO: 4.49 M/UL (ref 4.2–5.4)
SODIUM SERPL-SCNC: 141 MMOL/L (ref 135–145)
WBC # BLD AUTO: 9.7 K/UL (ref 4.8–10.8)

## 2019-08-25 PROCEDURE — 82962 GLUCOSE BLOOD TEST: CPT | Mod: 91

## 2019-08-25 PROCEDURE — 36415 COLL VENOUS BLD VENIPUNCTURE: CPT

## 2019-08-25 PROCEDURE — 770010 HCHG ROOM/CARE - REHAB SEMI PRIVAT*

## 2019-08-25 PROCEDURE — 700111 HCHG RX REV CODE 636 W/ 250 OVERRIDE (IP): Performed by: PHYSICAL MEDICINE & REHABILITATION

## 2019-08-25 PROCEDURE — 99232 SBSQ HOSP IP/OBS MODERATE 35: CPT | Performed by: HOSPITALIST

## 2019-08-25 PROCEDURE — 97112 NEUROMUSCULAR REEDUCATION: CPT

## 2019-08-25 PROCEDURE — 80048 BASIC METABOLIC PNL TOTAL CA: CPT

## 2019-08-25 PROCEDURE — A9270 NON-COVERED ITEM OR SERVICE: HCPCS | Performed by: HOSPITALIST

## 2019-08-25 PROCEDURE — 97530 THERAPEUTIC ACTIVITIES: CPT

## 2019-08-25 PROCEDURE — 97110 THERAPEUTIC EXERCISES: CPT

## 2019-08-25 PROCEDURE — 83735 ASSAY OF MAGNESIUM: CPT

## 2019-08-25 PROCEDURE — 85027 COMPLETE CBC AUTOMATED: CPT

## 2019-08-25 PROCEDURE — A9270 NON-COVERED ITEM OR SERVICE: HCPCS | Performed by: PHYSICAL MEDICINE & REHABILITATION

## 2019-08-25 PROCEDURE — 700102 HCHG RX REV CODE 250 W/ 637 OVERRIDE(OP): Performed by: HOSPITALIST

## 2019-08-25 PROCEDURE — 97116 GAIT TRAINING THERAPY: CPT

## 2019-08-25 PROCEDURE — 97535 SELF CARE MNGMENT TRAINING: CPT

## 2019-08-25 PROCEDURE — 700102 HCHG RX REV CODE 250 W/ 637 OVERRIDE(OP): Performed by: PHYSICAL MEDICINE & REHABILITATION

## 2019-08-25 RX ADMIN — METFORMIN HYDROCHLORIDE 500 MG: 500 TABLET, FILM COATED ORAL at 17:27

## 2019-08-25 RX ADMIN — ENOXAPARIN SODIUM 40 MG: 100 INJECTION SUBCUTANEOUS at 08:21

## 2019-08-25 RX ADMIN — SITAGLIPTIN 25 MG: 50 TABLET, FILM COATED ORAL at 08:15

## 2019-08-25 RX ADMIN — ASPIRIN 81 MG: 81 TABLET, COATED ORAL at 08:15

## 2019-08-25 RX ADMIN — ENALAPRIL MALEATE 5 MG: 5 TABLET ORAL at 05:30

## 2019-08-25 RX ADMIN — ATORVASTATIN CALCIUM 80 MG: 40 TABLET, FILM COATED ORAL at 22:01

## 2019-08-25 RX ADMIN — MAGNESIUM OXIDE TAB 400 MG (241.3 MG ELEMENTAL MG) 400 MG: 400 (241.3 MG) TAB at 08:15

## 2019-08-25 RX ADMIN — VITAMIN D, TAB 1000IU (100/BT) 2000 UNITS: 25 TAB at 08:15

## 2019-08-25 RX ADMIN — METFORMIN HYDROCHLORIDE 500 MG: 500 TABLET, FILM COATED ORAL at 08:16

## 2019-08-25 RX ADMIN — SENNOSIDES, DOCUSATE SODIUM 2 TABLET: 50; 8.6 TABLET, FILM COATED ORAL at 08:15

## 2019-08-25 ASSESSMENT — ENCOUNTER SYMPTOMS
NAUSEA: 0
COUGH: 0
EYES NEGATIVE: 1
ABDOMINAL PAIN: 0
MUSCULOSKELETAL NEGATIVE: 1
FEVER: 0
DIZZINESS: 1
PALPITATIONS: 0
CHILLS: 0
SHORTNESS OF BREATH: 0
VOMITING: 0

## 2019-08-25 NOTE — THERAPY
"Occupational Therapy  Daily Treatment     Patient Name: Noemi Lopez  Age:  69 y.o., Sex:  female  Medical Record #: [id number]  Today's Date: 2019     Precautions  Precautions: Fall Risk  Comments: Swedish speaking, dizziness    Safety   ADL Safety : (P) Requires Supervision for Safety  Bathroom Safety: (P) Requires Supervision for Safety  Comments: (P) SBA with family member or staff present for standing ADL pursuits.     Subjective    \"Quiero vero ducha.\"  Pt indicating the desire to take a shower     Objective       19 1031   Safety    ADL Safety  Requires Supervision for Safety   Bathroom Safety Requires Supervision for Safety   Comments SBA with family member or staff present for standing ADL pursuits.    Cognition    Level of Consciousness Alert   Interdisciplinary Plan of Care Collaboration   IDT Collaboration with  Family / Caregiver   Patient Position at End of Therapy Seated;Family / Friend in Room   Collaboration Comments Family present    OT Total Time Spent   OT Individual Total Time Spent (Mins) 30   OT Charge Group   Charges Yes   OT Self Care / ADL 2       FIM Eating Score:  5 - Standby Prompting/Supervision or Set-up  Eating Description:  Increased time, Supervision for safety(SBA with family present for meal.  No observable difficulty with loading/unloading utensils)    FIM Grooming Score:  5 - Standby Prompting/Supervision or Set-up  Grooming Description:  Increased time, Supervision for safety(SBA hair care, oral care at sinkside )    FIM Bathing Score:  5 - Standby Prompting/Supervision or Set-up  Bathing Description:       FIM Upper Body Dressin - Modified Independent  Upper Body Dressing Description:  Increased time(Seated UB dressing to don/doff bra/blouse )    FIM Lower Body Dressing Score:  5 - Standby Prompting/Supervsion or Set-up  Lower Body Dressing Description:  Increased time, Supervision for safety(Donned/doffed elastic band pants, underwear, nonskid socks " with use of grab bar for steadying assist at a SBA level with no LOB. )    FIM Toileting Body Dressin - Modified Independent  Toileting Description:  Grab bar, Increased time, Supervision for safety(SUP level secondary to safety concerns, no VQs or tactile cues to utilize grab bar before/after task.  No LOB )    FIM Bed/Chair/Wheelchair Transfers Score:    Bed/Chair/Wheelchair Transfers Description:       FIM Toilet Transfer Score:  5 - Standby Prompting/Supervision or Set-up  Toilet Transfer Description:  Grab bar, Supervision for safety(SBA FWW <> toilet)    FIM Tub/Shower Transfers Score:  5 - Standby Prompting/Supervision or Set-up  Tub/Shower Transfers Description:  Supervision for safety, Grab bar, Increased time, Verbal cueing(FWW to tub bench (SBA), tub bench to w/c close SBA with cue to utilize grab bar)        Assessment    Family training provided for shower activity.  Pt's daughter, Rachel, demonstrated good environmental safety awareness during supervised showering task with pt demonstrating showering, dressing, toileting, functional transfers at a supervision/SBA level.  Pt demonstrated Mod I in-room functional mobility with FWW but cont' to require SBA/SUP in bathroom environment secondary to potential environmental hazards.     Plan    Cont' overall strengthening, balance building, endurance building, visual perceptual activities to increase overall safety and independence with ADL/IADL pursuits.

## 2019-08-25 NOTE — THERAPY
Physical Therapy   Daily Treatment     Patient Name: Noemi Lopez  Age:  69 y.o., Sex:  female  Medical Record #: [id number]  Today's Date: 8/25/2019     Precautions  Precautions: (P) Fall Risk  Comments: (P) Bulgarian speaking, dizziness    Subjective    Pt is agreeable to PT,  used throughout session with pt and family. Family states they have been assisting pt in the room     Objective       08/25/19 0931   Pain 0 - 10 Group   Therapist Pain Assessment 0   Cognition    Level of Consciousness Alert   Sitting Lower Body Exercises   Nustep Resistance Level 2;Time (See Comments)  (B LE/UE x 10' for strength and improved activity tolerance)   Bed Mobility    Sit to Stand Contact Guard Assist  (cues for hand placement wc <> FWW)   Interdisciplinary Plan of Care Collaboration   IDT Collaboration with  Family / Caregiver   Patient Position at End of Therapy Seated;Family / Friend in Room   Collaboration Comments Daughter and other family members presnt for PT session   PT Charge Group   PT Gait Training 1   PT Therapeutic Exercise 1   PT Therapeutic Activities 2   Supervising Physical Therapist Cathleen Lewis       FIM Walking Score:  4 - Minimal Assistance  Walking Description:  Extra time, Requires incidental assist, Supervision for safety, Verbal cueing, Walker(150' x 1 and two bouts of 75' FWW CGA-during FT)    tx session focused on training with family on safe amb with FWW in room and room to/from dining room. Via  this therapist explained use of gait belt, proximity to walker, sequencing for STS, locking of wc and assisting with wayfinding for pt. Completed mock toilet transfer, pt amb from EOB > toilet with FWW. Instructed on CGA throughout all mobility.    Assessment    Pt's daughter, Rachel completed family training and demonstrated safety during amb with pt in facility. Updated room board, family cleared to amb in facility and in room with pt using gait belt and FWW. Pt with  good activity tolerance noted    Plan    Progress gait, standing balance, reinforce safety with functional mobility, family training for guarding, safety, and transfers, complete standardized balance assessment

## 2019-08-25 NOTE — THERAPY
"Occupational Therapy  Daily Treatment     Patient Name: Noemi Lopez  Age:  69 y.o., Sex:  female  Medical Record #: [id number]  Today's Date: 8/25/2019     Precautions  Precautions: Fall Risk  Comments: Sami speaking, dizziness    Safety   ADL Safety : Requires Supervision for Safety  Bathroom Safety: Requires Supervision for Safety  Comments: SBA with family member or staff present for standing ADL pursuits.     Subjective    \"Me gusta kamila machina\"  Re: [employer]     Objective       08/25/19 0831   Precautions   Precautions Fall Risk   Comments Sami speaking, dizziness   Safety    ADL Safety  Requires Supervision for Safety   Bathroom Safety Requires Supervision for Safety   Comments SBA with family member or staff present for standing ADL pursuits.    Pain   Intervention Declines   Pain 0 - 10 Group   Therapist Pain Assessment Post Activity Pain Same as Prior to Activity;0   Non Verbal Descriptors   Non Verbal Scale  Calm   Cognition    Cognition / Consciousness WDL   Orientation Level Oriented x 4   Level of Consciousness Alert   Comments Intermittent cues to engage brakes prior to w/c transfers   Vision Screen   Vision Tested   Visual Acuity Able to read clock/calander on wall without difficulty   Visual Screen Results Diplopia  (Between ~ 7 and 36 inches )   Fine Motor / Dexterity    Fine Motor / Dexterity Yes   Fine Motor / Dexterity Interventions Visual motor integration task with pt reading large print >10 ft from seated position and writing/reading small print within 10-12 inch range x 20 letters/words.     Neuro-Muscular Treatments   Neuro-Muscular Treatments Postural Changes;Sequencing;Tactile Cuing;Weight Shift Right;Weight Shift Left   Comments EOM seated/standing task with UE support in standing.  SBA for standing tasks with LUE support and no LOB with seated RBs after 3-5 minute standing duration segments.     Sitting Lower Body Exercises   Sitting Lower Body Exercises Yes   Sit to " Stand 1 set of 10  (EOM to FWW at a SBA/CGA level )   Standing Lower Body Exercises   Standing Lower Body Exercises Yes   Marching 2 sets of 10  (during in-facility ambulation from main gym to room at [facility])   Balance   Sitting Balance (Static) Good   Sitting Balance (Dynamic) Good   Standing Balance (Static) Fair   Standing Balance (Dynamic) Fair -   Weight Shift Sitting Good   Weight Shift Standing Fair   Skilled Intervention Sequencing;Tactile Cuing;Verbal Cuing   Comments Intermittent tactile/verbal cues during standing/in-facility ambulation with FWW    [employer]   Patient Position Standing   Application Visual-motor integration   Mode A   Number of Rings 4   Quadrants LUQ;LLQ;RUQ;RLQ   Average Reaction Time 1.67   Clinical Observations Coordination intact;Standing balance intact;Uses assistive device to maintain balance  (Pt maintained verbal count of number of hits spontaneously)   Interdisciplinary Plan of Care Collaboration   IDT Collaboration with  Family / Caregiver   Patient Position at End of Therapy Seated;Family / Friend in Room   Collaboration Comments Family present for session   OT Total Time Spent   OT Individual Total Time Spent (Mins) 60   OT Charge Group   Charges Yes   OT Neuromuscular Re-education / Balance 2   OT Therapy Activity 2       FIM Eating Score:  5 - Standby Prompting/Supervision or Set-up  Eating Description:  Increased time, Supervision for safety(SBA with family present for meal.  No observable difficulty with loading/unloading utensils)    Assessment    Pt attentive and cooperative with treatment activities.  Pt noted cont' diplopia in close range (<36 inches) and was provided with visual motor exercises to complete 10 minutes-15 minutes/day outside of therapy.  2 lateral LOB with self/family correction observed when turning with FWW towards the right side.  Family present throughout session and participative with therapeutic interventions.  Pt cont' to require cues to engage  w/c brakes prior to completing STS to FWW.   W/c not used throughout session; only upon initial arrival.     Plan    Cont' overall strengthening, balance building, endurance building to increase overall safety and independence with ADL/IADL pursuits.  Family training with showering task during 10:30 session.

## 2019-08-25 NOTE — THERAPY
Physical Therapy   Daily Treatment     Patient Name: Noemi Lopez  Age:  69 y.o., Sex:  female  Medical Record #: [id number]  Today's Date: 8/25/2019     Precautions  Precautions: Fall Risk  Comments: Telugu speaking, dizziness    Subjective    Agreed to therapy     Objective     08/25/19 1101   Interdisciplinary Plan of Care Collaboration   IDT Collaboration with  Family / Caregiver   Patient Position at End of Therapy Seated;Family / Friend in Room   Collaboration Comments son/inlaw present for PT   PT Total Time Spent   PT Individual Total Time Spent (Mins) 30   PT Charge Group   PT Gait Training 2   Supervising Physical Therapist Cathleen Lewis     Intro to 4WW, CGA pt amb 300' x 1 and 140' x 1. Instructed pt(via  of family) on use of breaks, sequencing, safety with 4WW.     Assessment    Pt had 1 LOB with transition for stand to sit following 2nd bout of gait with 4WW, her son was able to provide appropriate guarding/Juvenal to safely assist pt to chair    Plan    Progress gait, standing balance, reinforce safety with functional mobility, cont family training for guarding, safety, and transfers, complete standardized balance assessment

## 2019-08-25 NOTE — PROGRESS NOTES
Hospital Medicine Daily Progress Note      Chief Complaint  HTN, DM    Interval Problem Update  Pt seen and examined during therapies.  Doing well.    Review of Systems  Review of Systems   Constitutional: Negative for chills and fever.   HENT: Negative.    Eyes: Negative.    Respiratory: Negative for cough and shortness of breath.    Cardiovascular: Negative for chest pain and palpitations.   Gastrointestinal: Negative for abdominal pain, nausea and vomiting.   Genitourinary: Negative.    Musculoskeletal: Negative.    Skin: Negative for itching and rash.   Neurological: Positive for dizziness.        Physical Exam  Temp:  [36.4 °C (97.6 °F)-36.8 °C (98.2 °F)] 36.4 °C (97.6 °F)  Pulse:  [73-85] 73  Resp:  [18] 18  BP: (111-146)/(63-76) 146/67  SpO2:  [94 %-95 %] 95 %    Physical Exam   Constitutional: She is oriented to person, place, and time. No distress.   HENT:   Head: Normocephalic and atraumatic.   Right Ear: External ear normal.   Left Ear: External ear normal.   Eyes: Conjunctivae and EOM are normal. Right eye exhibits no discharge. Left eye exhibits no discharge.   Neck: Normal range of motion. Neck supple. No tracheal deviation present.   Cardiovascular: Normal rate and regular rhythm.   Pulmonary/Chest: Effort normal and breath sounds normal. No stridor. No respiratory distress. She has no wheezes.   Abdominal: Soft. Bowel sounds are normal. She exhibits no distension. There is no tenderness.   Musculoskeletal: She exhibits no edema or tenderness.   Neurological: She is alert and oriented to person, place, and time.   Skin: Skin is warm and dry. She is not diaphoretic.   Vitals reviewed.      Fluids    Intake/Output Summary (Last 24 hours) at 8/25/2019 1220  Last data filed at 8/24/2019 1808  Gross per 24 hour   Intake 400 ml   Output --   Net 400 ml       Laboratory  Recent Labs     08/25/19  0542   WBC 9.7   RBC 4.49   HEMOGLOBIN 13.5   HEMATOCRIT 43.1   MCV 96.0   MCH 30.1   MCHC 31.3*   RDW 51.8*    PLATELETCT 162*   MPV 10.9     Recent Labs     08/25/19  0542   SODIUM 141   POTASSIUM 4.0   CHLORIDE 110   CO2 21   GLUCOSE 128*   BUN 20   CREATININE 0.61   CALCIUM 9.1                   Assessment/Plan  * Stroke, lacunar (HCC)- (present on admission)  Assessment & Plan  On ASA and Lipitor  Scopolamine patch for dizziness    Vitamin D deficiency  Assessment & Plan  Vit D level 20  On supplementation    Hypomagnesemia  Assessment & Plan  F/U levels still borderline low  Continue MgOx supplements    Essential hypertension- (present on admission)  Assessment & Plan  Continue Enalapil for blood pressure control    Type 2 diabetes mellitus with hyperglycemia, without long-term current use of insulin (HCC)- (present on admission)  Assessment & Plan  HbA1c 9.8  Continue Januvia and Metformin  Serum glucose trends slowly improving w/ resumption of outpt meds     Full Code

## 2019-08-26 LAB
GLUCOSE BLD-MCNC: 125 MG/DL (ref 65–99)
GLUCOSE BLD-MCNC: 149 MG/DL (ref 65–99)
GLUCOSE BLD-MCNC: 162 MG/DL (ref 65–99)
GLUCOSE BLD-MCNC: 177 MG/DL (ref 65–99)
GLUCOSE BLD-MCNC: 219 MG/DL (ref 65–99)

## 2019-08-26 PROCEDURE — 99233 SBSQ HOSP IP/OBS HIGH 50: CPT | Performed by: PHYSICAL MEDICINE & REHABILITATION

## 2019-08-26 PROCEDURE — 99232 SBSQ HOSP IP/OBS MODERATE 35: CPT | Performed by: HOSPITALIST

## 2019-08-26 PROCEDURE — 97112 NEUROMUSCULAR REEDUCATION: CPT

## 2019-08-26 PROCEDURE — 97530 THERAPEUTIC ACTIVITIES: CPT

## 2019-08-26 PROCEDURE — 700102 HCHG RX REV CODE 250 W/ 637 OVERRIDE(OP): Performed by: HOSPITALIST

## 2019-08-26 PROCEDURE — 97116 GAIT TRAINING THERAPY: CPT

## 2019-08-26 PROCEDURE — A9270 NON-COVERED ITEM OR SERVICE: HCPCS | Performed by: HOSPITALIST

## 2019-08-26 PROCEDURE — A9270 NON-COVERED ITEM OR SERVICE: HCPCS | Performed by: PHYSICAL MEDICINE & REHABILITATION

## 2019-08-26 PROCEDURE — 97535 SELF CARE MNGMENT TRAINING: CPT

## 2019-08-26 PROCEDURE — 97110 THERAPEUTIC EXERCISES: CPT

## 2019-08-26 PROCEDURE — 700102 HCHG RX REV CODE 250 W/ 637 OVERRIDE(OP): Performed by: PHYSICAL MEDICINE & REHABILITATION

## 2019-08-26 PROCEDURE — 700111 HCHG RX REV CODE 636 W/ 250 OVERRIDE (IP): Performed by: PHYSICAL MEDICINE & REHABILITATION

## 2019-08-26 PROCEDURE — 770010 HCHG ROOM/CARE - REHAB SEMI PRIVAT*

## 2019-08-26 PROCEDURE — 82962 GLUCOSE BLOOD TEST: CPT

## 2019-08-26 RX ADMIN — MAGNESIUM OXIDE TAB 400 MG (241.3 MG ELEMENTAL MG) 400 MG: 400 (241.3 MG) TAB at 08:12

## 2019-08-26 RX ADMIN — ENALAPRIL MALEATE 5 MG: 5 TABLET ORAL at 06:04

## 2019-08-26 RX ADMIN — VITAMIN D, TAB 1000IU (100/BT) 2000 UNITS: 25 TAB at 08:11

## 2019-08-26 RX ADMIN — ENOXAPARIN SODIUM 40 MG: 100 INJECTION SUBCUTANEOUS at 08:12

## 2019-08-26 RX ADMIN — METFORMIN HYDROCHLORIDE 500 MG: 500 TABLET, FILM COATED ORAL at 18:01

## 2019-08-26 RX ADMIN — ASPIRIN 81 MG: 81 TABLET, COATED ORAL at 08:11

## 2019-08-26 RX ADMIN — METFORMIN HYDROCHLORIDE 500 MG: 500 TABLET, FILM COATED ORAL at 08:12

## 2019-08-26 RX ADMIN — SENNOSIDES, DOCUSATE SODIUM 2 TABLET: 50; 8.6 TABLET, FILM COATED ORAL at 21:17

## 2019-08-26 RX ADMIN — SITAGLIPTIN 25 MG: 50 TABLET, FILM COATED ORAL at 08:11

## 2019-08-26 RX ADMIN — ATORVASTATIN CALCIUM 80 MG: 40 TABLET, FILM COATED ORAL at 21:17

## 2019-08-26 ASSESSMENT — BALANCE ASSESSMENTS
LOOK OVER LEFT AND RIGHT SHOULDERS WHILE STANDING: 0
LONG VERSION TOTAL SCORE (MAX 56): 29
STANDING ON ONE LEG: 0
TRANSFERS: 3
STANDING UNSUPPORTED WITH FEET TOGETHER: 0
STANDING TO SITTING: 3
LONG VERSION TOTAL SCORE (MAX 56): 29
STANDING UNSUPPORTED WITH EYES CLOSED: 4
SITTING UNSUPPORTED: 4
STANDING UNSUPPORTED: 4
STANDING UNSUPPORTED ONE FOOT IN FRONT: 0
TURN 360 DEGREES: 1
SITTING TO STANDING: 4
PLACE ALTERNATE FOOT ON STEP OR STOOL WHILE STANDING UNSUPPORTED: 0
REACHING FORWARD WITH OUTSTRETCHED ARM WHILE STANDING: 3
PICK UP OBJECT FROM THE FLOOR FROM A STANDING POSITION: 3

## 2019-08-26 ASSESSMENT — ENCOUNTER SYMPTOMS
VOMITING: 0
FEVER: 0
MUSCULOSKELETAL NEGATIVE: 1
COUGH: 0
EYES NEGATIVE: 1
DIZZINESS: 1
ABDOMINAL PAIN: 0
PALPITATIONS: 0
CHILLS: 0
NAUSEA: 0
SHORTNESS OF BREATH: 0

## 2019-08-26 NOTE — CARE PLAN
Problem: Safety  Goal: Will remain free from falls  Outcome: PROGRESSING AS EXPECTED  Note:   Family has been cleared by therapy to assist pt with transfers. Pt and family demonstrate appropriate safety technique and call when help is needed. Will continue to monitor.     Problem: Pain Management  Goal: Pain level will decrease to patient's comfort goal  Outcome: PROGRESSING AS EXPECTED  Flowsheets (Taken 8/25/2019 0824)  Comfort Goal: Comfort at Rest;Comfort with Movement;Perform Activity;Stay Alert  Pain Rating Scale (NPRS): 0  Note:   Pt has no c/o of pain; able to participate in all therapies and activities this shift. Will continue to monitor.

## 2019-08-26 NOTE — THERAPY
Occupational Therapy  Daily Treatment     Patient Name: Noemi Lopez  Age:  69 y.o., Sex:  female  Medical Record #: [id number]  Today's Date: 8/26/2019     Precautions  Precautions: Fall Risk  Comments: Telugu speaking, Diplopia    Safety   ADL Safety : Requires Supervision for Safety  Bathroom Safety: Requires Supervision for Safety  Comments: Minimal cues for safety (ie lock w/c breaks prior to standing)    Subjective    Patient agreeable to participate in OT. Son present during session.      Objective       08/26/19 0701   Precautions   Precautions Fall Risk   Comments Telugu speaking, Diplopia   Safety    ADL Safety  Requires Supervision for Safety   Bathroom Safety Requires Supervision for Safety   Comments Minimal cues for safety (ie lock w/c breaks prior to standing)   Cognition    Cognition / Consciousness WDL   Orientation Level Oriented x 4   Level of Consciousness Alert   Sitting Lower Body Exercises   Sit to Stand 3 sets of 10  (PT performed STS exercises in II bars with SBA. No LOB noted)   Balance   Sitting Balance (Static) Good   Sitting Balance (Dynamic) Good   Standing Balance (Static) Fair   Standing Balance (Dynamic) Fair -   Interdisciplinary Plan of Care Collaboration   IDT Collaboration with  Family / Caregiver   Patient Position at End of Therapy Seated;Self Releasing Lap Belt Applied   Collaboration Comments Son present during OT session   OT Total Time Spent   OT Individual Total Time Spent (Mins) 60   OT Charge Group   OT Self Care / ADL 2   OT Neuromuscular Re-education / Balance 1   OT Therapy Activity 1       FIM Grooming Score:  6 - Modified Independent  Grooming Description:  Increased time(Patient brushed hair with mod I at w/c level)    FIM Bathing Score:  5 - Standby Prompting/Supervision or Set-up  Bathing Description:   FIM Bathing Score:  5 - Standby Prompting/Supervision or Set-up  Bathing Description:  Grab bar, Tub bench, Hand rails, Hand held shower, Increased  time, Supervision for safety, Set-up of equipment(Patient washed, rinsed and dried all body parts while incorporating sitting/standing with SBA. Used grab bar for steadying assist while standing to wash tia area/ buttocks. No LOB noted during bathing task. Set-up provided prior to task. )    FIM Upper Body Dressin - Modified Independent  Upper Body Dressing Description:  (Patient retrieved clothing from closet and donned bra and long-sleeve shirt with mod I (w/c level))    FIM Lower Body Dressing Score:  5 - Standby Prompting/Supervsion or Set-up  Lower Body Dressing Description:  Increased time, Supervision for safety(Patient retrieved clothing from closet and donned underwear, pants and non-skid socks with SBA for standing balance and safety. )    FIM Tub/Shower Transfers Score:  5 - Standby Prompting/Supervision or Set-up  Tub/Shower Transfers Description:  Increased time, Supervision for safety(Patient transferred w/c <> shower chair with supervision for safety (required cue to lock w/c breaks prior to standing).)    Patient participated in visual-cog activity (32 jumbo-piece puzzle) while standing. Required mod vc's for visual problem solving (ie placement of edge piece vs center piece).    Assessment    Patient tolerated OT session well with focus on ADLs, standing balance and visual perceptual activity. Overall patient demonstrates good safety awareness within ADL tasks, however continues to requires vc's to lock w/c brakes prior to standing or transferring to/from w/c. Educated patient and son re: importance and safety considerations related to always locking w/c brakes prior to any standing or functional transfer activities.     Plan    Continue overall strengthening, balance building, endurance building to increase overall safety and independence with ADL/IADL pursuits

## 2019-08-26 NOTE — CARE PLAN
Problem: Bathing  Goal: STG-Within one week, patient will bathe  Description  1) Individualized Goal:  Mod I with AE/DME PRN  2) Interventions:  OT Group Therapy, OT Self Care/ADL, OT Cognitive Skill Dev, OT Community Reintegration, OT Neuro Re-Ed/Balance, OT Sensory Int Techniques, OT Therapeutic Activity and OT Evaluation     Outcome: NOT MET     Problem: Grooming  Goal: STG-Within one week, patient will complete grooming  Description  1) Individualized Goal:  SBA with AE/DME PRN standing at sink  2) Interventions:  OT Group Therapy, OT Self Care/ADL, OT Cognitive Skill Dev, OT Community Reintegration, OT Neuro Re-Ed/Balance, OT Sensory Int Techniques, OT Therapeutic Activity and OT Evaluation     Outcome: MET     Problem: Dressing  Goal: STG-Within one week, patient will dress LB  Description  1) Individualized Goal:  SBA with AE/DME PRN  2) Interventions:  OT Group Therapy, OT Self Care/ADL, OT Cognitive Skill Dev, OT Community Reintegration, OT Neuro Re-Ed/Balance, OT Sensory Int Techniques, OT Therapeutic Activity and OT Evaluation     Outcome: MET

## 2019-08-26 NOTE — PROGRESS NOTES
Rehab Progress Note     Date of Service: 8/26/2019  Chief Complaint: follow up stroke    Interval Events (Subjective)    Patient seen and examined in her room today.  Using an  she reports continued double vision though this has improved and now occurs at about 6 feet in front of her compared about 3 feet.  She also reports intermittent dizziness that happens mostly when she has the double vision.  If she closes her eyes the dizziness improves.  Her family is at bedside and report they feel that she is ready for discharge at any time though that they will follow our recommendations.  Advised we will have her weekly conference today to pick a discharge date.    Objective:  VITAL SIGNS: /67   Pulse 74   Temp 36.3 °C (97.3 °F) (Oral)   Resp 20   Ht 1.524 m (5')   Wt 68 kg (149 lb 14.6 oz)   SpO2 94%   BMI 29.28 kg/m²   Gen: alert, no apparent distress  CV: regular rate and rhythm, no murmurs, no peripheral edema  Resp: clear to ascultation bilaterally, normal respiratory effort  GI: soft, non-tender abdomen, bowel sounds present  Neuro: notable for double vision    Recent Results (from the past 72 hour(s))   ACCU-CHEK GLUCOSE    Collection Time: 08/23/19  5:33 PM   Result Value Ref Range    Glucose - Accu-Ck 157 (H) 65 - 99 mg/dL   ACCU-CHEK GLUCOSE    Collection Time: 08/23/19  8:50 PM   Result Value Ref Range    Glucose - Accu-Ck 228 (H) 65 - 99 mg/dL   ACCU-CHEK GLUCOSE    Collection Time: 08/24/19  8:00 AM   Result Value Ref Range    Glucose - Accu-Ck 165 (H) 65 - 99 mg/dL   ACCU-CHEK GLUCOSE    Collection Time: 08/24/19 11:09 AM   Result Value Ref Range    Glucose - Accu-Ck 391 (H) 65 - 99 mg/dL   ACCU-CHEK GLUCOSE    Collection Time: 08/24/19  5:36 PM   Result Value Ref Range    Glucose - Accu-Ck 109 (H) 65 - 99 mg/dL   ACCU-CHEK GLUCOSE    Collection Time: 08/24/19  8:30 PM   Result Value Ref Range    Glucose - Accu-Ck 222 (H) 65 - 99 mg/dL   CBC WITHOUT DIFFERENTIAL    Collection Time:  08/25/19  5:42 AM   Result Value Ref Range    WBC 9.7 4.8 - 10.8 K/uL    RBC 4.49 4.20 - 5.40 M/uL    Hemoglobin 13.5 12.0 - 16.0 g/dL    Hematocrit 43.1 37.0 - 47.0 %    MCV 96.0 81.4 - 97.8 fL    MCH 30.1 27.0 - 33.0 pg    MCHC 31.3 (L) 33.6 - 35.0 g/dL    RDW 51.8 (H) 35.9 - 50.0 fL    Platelet Count 162 (L) 164 - 446 K/uL    MPV 10.9 9.0 - 12.9 fL   Basic Metabolic Panel    Collection Time: 08/25/19  5:42 AM   Result Value Ref Range    Sodium 141 135 - 145 mmol/L    Potassium 4.0 3.6 - 5.5 mmol/L    Chloride 110 96 - 112 mmol/L    Co2 21 20 - 33 mmol/L    Glucose 128 (H) 65 - 99 mg/dL    Bun 20 8 - 22 mg/dL    Creatinine 0.61 0.50 - 1.40 mg/dL    Calcium 9.1 8.5 - 10.5 mg/dL    Anion Gap 10.0 0.0 - 11.9   MAGNESIUM    Collection Time: 08/25/19  5:42 AM   Result Value Ref Range    Magnesium 1.6 1.5 - 2.5 mg/dL   ESTIMATED GFR    Collection Time: 08/25/19  5:42 AM   Result Value Ref Range    GFR If African American >60 >60 mL/min/1.73 m 2    GFR If Non African American >60 >60 mL/min/1.73 m 2   ACCU-CHEK GLUCOSE    Collection Time: 08/25/19  8:12 AM   Result Value Ref Range    Glucose - Accu-Ck 173 (H) 65 - 99 mg/dL   ACCU-CHEK GLUCOSE    Collection Time: 08/25/19 11:33 AM   Result Value Ref Range    Glucose - Accu-Ck 166 (H) 65 - 99 mg/dL   ACCU-CHEK GLUCOSE    Collection Time: 08/25/19  5:25 PM   Result Value Ref Range    Glucose - Accu-Ck 197 (H) 65 - 99 mg/dL   ACCU-CHEK GLUCOSE    Collection Time: 08/25/19 10:04 PM   Result Value Ref Range    Glucose - Accu-Ck 162 (H) 65 - 99 mg/dL   ACCU-CHEK GLUCOSE    Collection Time: 08/26/19  8:05 AM   Result Value Ref Range    Glucose - Accu-Ck 177 (H) 65 - 99 mg/dL   ACCU-CHEK GLUCOSE    Collection Time: 08/26/19 11:23 AM   Result Value Ref Range    Glucose - Accu-Ck 219 (H) 65 - 99 mg/dL       Current Facility-Administered Medications   Medication Frequency   • metFORMIN (GLUCOPHAGE) tablet 500 mg BID WITH MEALS   • magnesium oxide (MAG-OX) tablet 400 mg DAILY   •  insulin regular (HUMULIN R) injection 2-12 Units 4X/DAY ACHS    And   • glucose 4 g chewable tablet 16 g Q15 MIN PRN    And   • dextrose 50% (D50W) injection 50 mL Q15 MIN PRN   • vitamin D (cholecalciferol) tablet 2,000 Units DAILY   • Respiratory Care per Protocol Continuous RT   • Pharmacy Consult Request ...Pain Management Review 1 Each PHARMACY TO DOSE   • acetaminophen (TYLENOL) tablet 650 mg Q4HRS PRN   • hydrALAZINE (APRESOLINE) tablet 10 mg Q8HRS PRN   • artificial tears ophthalmic solution 1 Drop PRN   • benzocaine-menthol (CEPACOL) lozenge 1 Lozenge Q2HRS PRN   • mag hydrox-al hydrox-simeth (MAALOX PLUS ES or MYLANTA DS) suspension 20 mL Q2HRS PRN   • ondansetron (ZOFRAN ODT) dispertab 4 mg 4X/DAY PRN    Or   • ondansetron (ZOFRAN) syringe/vial injection 4 mg 4X/DAY PRN   • traZODone (DESYREL) tablet 50 mg QHS PRN   • sodium chloride (OCEAN) 0.65 % nasal spray 2 Spray PRN   • hydrOXYzine HCl (ATARAX) tablet 50 mg Q6HRS PRN   • melatonin tablet 3 mg HS PRN   • enoxaparin (LOVENOX) inj 40 mg DAILY   • senna-docusate (PERICOLACE or SENOKOT S) 8.6-50 MG per tablet 2 Tab BID    And   • polyethylene glycol/lytes (MIRALAX) PACKET 1 Packet QDAY PRN    And   • magnesium hydroxide (MILK OF MAGNESIA) suspension 30 mL QDAY PRN    And   • bisacodyl (DULCOLAX) suppository 10 mg QDAY PRN   • atorvastatin (LIPITOR) tablet 80 mg Q EVENING   • aspirin EC (ECOTRIN) tablet 81 mg DAILY   • SITagliptin (JANUVIA) tablet 25 mg DAILY   • scopolamine (TRANSDERM-SCOP) patch 1 Patch Q72HRS   • enalapril (VASOTEC) tablet 5 mg Q DAY       Orders Placed This Encounter   Procedures   • Diet Order Diabetic     Standing Status:   Standing     Number of Occurrences:   1     Order Specific Question:   Diet:     Answer:   Diabetic [3]     Order Specific Question:   Consistency/Fluid modifications:     Answer:   Thin Liquids [3]       Assessment:  Active Hospital Problems    Diagnosis   • *Stroke, lacunar (HCC)   • Elevated troponin   • Type  2 diabetes mellitus with hyperglycemia, without long-term current use of insulin (HCC)   • Essential hypertension     This patient is a 69 y.o. female admitted for acute inpatient rehabilitation with Stroke, lacunar (HCC).    I led and attended the weekly conference today, and agree with the IDT conference documentation and plan of care as noted below.    Date of conference: 8/26/2019    Goals and barriers: See IDT note.    Biggest barriers: double vision, moves quickly     Therapy update 8/26/2019  Supervision eating  Modified Independent grooming  Supervision bathing  Modified Independent upper body dressing  Supervision lower body dressing  Modified Independent toileting  Supervisionbladder  Min assist bowel  Min assist bed/chair transfer  Supervision toilet transfer  Supervision tub/shower transfer  Min assist ambulation  Supervision wheelchair propulsion  Max assist stairs  Modified Independent comprehension  Modified Independent expression  Modified Independent social interaction  Min assist problem solving  Min assist memory    CM/social support: lots of family support    Anticipated DC date: 8/28/2019    Outpatient PT    Equip: FWW     Follow up: PCP, neuro-ophthalmology Dr. Ireland    Medical Decision Making and Plan:    Right paramedial midbrain/thalmic stroke  Non-dominant  Double vision, improved  Nystagmus, improved  Continue full rehab program  PT/OT, 1.5 hr each discipline, 5 days per week  SLP eval without cognitive deficits  Continue aspirin and statin for secondary stroke prophylaxis  Eye patch PRN     Dizziness  Continue scopolamine patch  PRN Zofran     Hypertension  Continue Enalapril  Appreciate hospitalist assistance     Diabetes  Continue Januvia   Continue Metformin   Continue SSI  Appreciate hospitalist assistance    Vit D insufficiency  Continue supplementation    DVT prophylaxis  Discontinued Lovenox as patient is ambulating long distances    Total time:  40 minutes.  I spent greater  than 50% of the time for patient care, counseling, and coordination on this date, including patient face-to face time, unit/floor time with review of records/pertinent lab data and studies, as well as discussing diagnostic evaluation/work up, planned therapeutic interventions, and future disposition of care, as per the interval events/subjective and the assessment and plan as noted above.        Tereza Henderson M.D.   Physical Medicine and Rehabilitation

## 2019-08-26 NOTE — CARE PLAN
Problem: Communication  Goal: The ability to communicate needs accurately and effectively will improve  Note:   Encouraged to make needs known to staff and to use call light for staff assist.  Family stays with patient and translates to staff.     Problem: Safety  Goal: Will remain free from falls  Note:   Call light kept within reach and encouraged to use for assistance and with toileting needs. Encouraged to call staff and to wait for staff before transfers as needed.  Family cleared to transfer pt.

## 2019-08-26 NOTE — REHAB-OT IDT TEAM NOTE
Occupational Therapy   Activities of Daily Living  Eating Initial:  5 - Standby Prompting/Supervision or Set-up  Eating Current:  5 - Standby Prompting/Supervision or Set-up   Eating Description:  Increased time, Supervision for safety(SBA with family present for meal.  No observable difficulty with loading/unloading utensils)  Grooming Initial:  4 - Minimal Assistance  Grooming Current:  6 - Modified Independent   Grooming Description:  Increased time(Patient brushed hair with mod I at w/c level)  Bathing Initial:  5 - Standby Prompting/Supervision or Set-up  Bathing Current:  5 - Standby Prompting/Supervision or Set-up   Bathing Description:  Grab bar, Tub bench, Hand rails, Hand held shower, Increased time, Supervision for safety, Set-up of equipment(Patient washed, rinsed and dried all body parts while incorporating sitting/standing with SBA. Used grab bar for steadying assist while standing to wash tia area/ buttocks. No LOB noted during bathing task. Set-up provided prior to task. )  Upper Body Dressing Initial:  4 - Minimal Assistance  Upper Body Dressing Current:  6 - Modified Independent   Upper Body Dressing Description:  (Patient retrieved clothing from closet and donned bra and long-sleeve shirt with mod I (w/c level))  Lower Body Dressing Initial:  5 - Standby Prompting/Supervsion or Set-up  Lower Body Dressing Current:  5 - Standby Prompting/Supervsion or Set-up   Lower Body Dressing Description:  5 - Standby Prompting/Supervsion or Set-up  Toileting Initial:  5 - Standby Prompting/Supervision or Set-up  Toileting Current:  6 - Modified Independent   Toileting Description:  Grab bar, Increased time, Supervision for safety(SUP level secondary to safety concerns, no VQs or tactile cues to utilize grab bar before/after task.  No LOB )  Toilet Transfer Initial:  5 - Standby Prompting/Supervision or Set-up  Toilet Transfer Current:  5 - Standby Prompting/Supervision or Set-up   Toilet Transfer Description:   5 - Standby Prompting/Supervision or Set-up  Tub / Shower Transfer Initial:  4 - Minimal Assistance  Tub / Shower Transfer Current:  5 - Standby Prompting/Supervision or Set-up   Tub / Shower Transfer Description:  Increased time, Supervision for safety(Patient transferred w/c <> shower chair with supervision for safety (required cue to lock w/c breaks prior to standing)  IADL:  Patient completed meal prep task (cooked egg) on 8/22 with CGA to SBA for standing balance and safety. Used counter for UE support while ambulating in kitchen.   Family Training/Education:  Ongoing with family. family training completed during weekend for shower activity and safety/environmental considerations. Family is extremely supportive and involved in patient's care.   DME/DC Recommendations:  Shower chair, grab bars, non-skid bath mat, hand-held shower head recommended to facilitate safety and independence with bathing tasks.     Strengths:  Able to follow instructions, Adequate strength, Alert and oriented, Good insight into deficits/needs, Independent PLOF, Making steady progress towards goals, Manages pain appropriately, Motivated for self care and independence, Pleasant and cooperative, Supportive family and Willingly participates in therapeutic activities  Barriers:  Decreased endurance, Language barrier, non-fluent English and Other: decreased balance and diplopia     # of short term goals set= 3   # of short term goals met= 2    Occupational Therapy Goals     Problem: Bathing     Dates: Start: 08/21/19       Description:     Goal: STG-Within one week, patient will bathe     Dates: Start: 08/21/19       Description: 1) Individualized Goal:  Mod I with AE/DME PRN  2) Interventions:  OT Group Therapy, OT Self Care/ADL, OT Cognitive Skill Dev, OT Community Reintegration, OT Neuro Re-Ed/Balance, OT Sensory Int Techniques, OT Therapeutic Activity and OT Evaluation                   Problem: OT Long Term Goals     Dates: Start:  08/21/19       Description:     Goal: LTG-By discharge, patient will complete basic self care tasks     Dates: Start: 08/21/19       Description: 1) Individualized Goal:  Mod I with AE/DME PRN  2) Interventions:  OT Group Therapy, OT Self Care/ADL, OT Cognitive Skill Dev, OT Community Reintegration, OT Neuro Re-Ed/Balance, OT Sensory Int Techniques, OT Therapeutic Activity and OT Evaluation             Goal: LTG-By discharge, patient will perform bathroom transfers     Dates: Start: 08/21/19       Description: 1) Individualized Goal:  Mod I with AE/DME PRN  2) Interventions:  OT Group Therapy, OT Self Care/ADL, OT Cognitive Skill Dev, OT Community Reintegration, OT Neuro Re-Ed/Balance, OT Sensory Int Techniques, OT Therapeutic Activity and OT Evaluation                         Section completed by:  Shelia Barahona MS,OTR/L

## 2019-08-26 NOTE — REHAB-NURSING IDT TEAM NOTE
Nursing   Nursing  Diet/Nutrition:  Diabetic and Thin Liquids  Medication Administration:  Whole with Liquid Wash  % consumed at meals in last 24 hours:  Consumed % of meals per documentation.  Meal/Snack Consumption for the past 24 hrs:   Oral Nutrition   08/25/19 1830 Dinner;Between % Consumed   08/25/19 1300 Lunch;Between % Consumed     Snack schedule:  HS  Appetite:  Good  Fluid Intake/Output in past 24 hours: In: 650 [P.O.:650]  Out: -   Admit Weight:  Weight: 68 kg (149 lb 14.6 oz)  Weight Last 7 Days   [68 kg (149 lb 14.6 oz)] 68 kg (149 lb 14.6 oz) (08/21 1122)    Pain Issues:    Location:  Denies          Severity:  Denies   Scheduled pain medications:  None     PRN pain medications used in last 24 hours:  None   Non Pharmacologic Interventions:  distraction and emotional support  Effectiveness of pain management plan:  good=patient states acceptable comfort after interventions    Bowel:    Bowel Assist Initial Score:  4 - Minimal Assistance  Bowel Assist Current Score:  5 - Standby Prompting/Supervision or Set-up  Bowl Accidents in last 7 days:  0  Last bowel movement: 08/25/19(Per pt and family.)  Stool Description: Small, Loose     Usual bowel pattern:  daily  Scheduled bowel medications:  senna-docusate (PERICOLACE or SENOKOT S)   PRN bowel medications used in last 24 hours:  None  Nursing Interventions:  Increased time, Scheduled medication, Supervision, Verbal cueing  Effectiveness of bowel program:   good=regular, predictable, controlled emptying of bowel  Bladder:    Bladder Assist Initial Score:  4 - Minimal Assistance  Bladder Assist Current Score:  5 - Standby Prompting/Supervision or Set-up  Bladder Accidents in last 7 days:  0  PVR range for past 24-48 hours:  [22]  ()  Medications affecting bladder:  None    Time void schedule/voiding pattern:  Pt initiates voiding  Interventions:  Increased time, Supervision, Verbal cueing  Effectiveness of bladder training:  Good=regular,  predictable, emptying of bladder, patient initiates time voiding   Diabetes:  Blood Sugar Frequency:  Before meals and at bedtime    Range of BS for last 48 hours:     Recent Labs     08/24/19  0800 08/24/19  1109 08/24/19  1736 08/24/19  2030 08/25/19  0812 08/25/19  1133 08/25/19  1725 08/25/19  2204   POCGLUCOSE 165* 391* 109* 222* 173* 166* 197* 162*     Scheduled diabetic medications:  metformin (GLUCOPHAGE) , Januvia  Sliding scale usage in past 24 hours:  Yes  Total Short acting insulin in the past 24 hours:  Humulin 8 units  Total Long acting insulin in the past 24 hours:  None      Sleep/wake cycle:   Average hours slept:  Sleeps 4-6 hours without waking  Sleep medication usage:  None    Patient/Family Training/Education:  Diabetes Management, Fall Prevention, General Self Care, Safe Transfers, Safety and Skin Care  Discharge Supply Recommendations:  Glucometer and Strips  Strengths: Alert and oriented, Supportive family and Pleasant and cooperative   Barriers:   Generalized weakness       Nursing Problems     Problem: Bowel/Gastric:     Description:     Goal: Normal bowel function is maintained or improved     Description:           Goal: Will not experience complications related to bowel motility     Description:                 Problem: Communication     Description:     Goal: The ability to communicate needs accurately and effectively will improve     Description:                 Problem: Discharge Barriers/Planning     Description:     Goal: Patient's continuum of care needs will be met     Description:                 Problem: GLYCEMIA IMBALANCE     Description:     Goal: Clinical indication of glycemia balance is achieved     Description:                 Problem: Infection     Description:     Goal: Will remain free from infection     Description:                 Problem: Knowledge Deficit     Description:     Goal: Knowledge of disease process/condition, treatment plan, diagnostic tests, and  medications will improve     Description:           Goal: Knowledge of the prescribed therapeutic regimen will improve     Description:                 Problem: Pain Management     Description:     Goal: Pain level will decrease to patient's comfort goal     Description:     Flowsheet:     Taken at 08/25/19 0824    Pain Rating Scale (NPRS) 0 - No Pain by  Elayne Cobb R.N.    Comfort Goal Comfort at Rest;Comfort with Movement;Perform Activity;Stay Alert by  Elayne Cobb R.N.                      Problem: Respiratory:     Description:     Goal: Respiratory status will improve     Description:                 Problem: Safety     Description:     Goal: Will remain free from injury     Description:           Goal: Will remain free from falls     Description:                 Problem: Venous Thromboembolism (VTW)/Deep Vein Thrombosis (DVT) Prevention:     Description:     Goal: Patient will participate in Venous Thrombosis (VTE)/Deep Vein Thrombosis (DVT)Prevention Measures     Description:     Flowsheet:     Taken at 08/24/19 0930    Pharmacologic Prophylaxis Used LMWH: Enoxaparin(Lovenox) by  Elayne Cobb R.N. Comment:  ASA                             Long Term Goals:   At discharge patient will be able to function safely at home and in the community with support.    Section completed by:  Washington Vergara RCEE

## 2019-08-26 NOTE — PROGRESS NOTES
Received bedside shift report from Elayne JAY RN regarding patient and assumed care. Patient awake, calm and stable, currently positioned in bed for comfort and safety; call light within reach. Denies pain or discomfort at this time. Will continue to monitor.

## 2019-08-26 NOTE — PROGRESS NOTES
[facility] Medicine Daily Progress Note      Chief Complaint  HTN, DM    Interval Problem Update  Pt seen and examined in dining room.  Denies complaints.    Review of Systems  Review of Systems   Constitutional: Negative for chills and fever.   HENT: Negative.    Eyes: Negative.    Respiratory: Negative for cough and shortness of breath.    Cardiovascular: Negative for chest pain and palpitations.   Gastrointestinal: Negative for abdominal pain, nausea and vomiting.   Genitourinary: Negative.    Musculoskeletal: Negative.    Skin: Negative for itching and rash.   Neurological: Positive for dizziness.        Physical Exam  Temp:  [36.3 °C (97.3 °F)-37.1 °C (98.7 °F)] 36.4 °C (97.5 °F)  Pulse:  [74-88] 88  Resp:  [18-20] 18  BP: (118-136)/(60-72) 118/71  SpO2:  [93 %-94 %] 93 %    Physical Exam   Constitutional: She is oriented to person, place, and time. No distress.   HENT:   Head: Normocephalic and atraumatic.   Right Ear: External ear normal.   Left Ear: External ear normal.   Eyes: Conjunctivae and EOM are normal. Right eye exhibits no discharge. Left eye exhibits no discharge.   Neck: Normal range of motion. Neck supple. No tracheal deviation present.   Cardiovascular: Normal rate and regular rhythm.   Pulmonary/Chest: Effort normal and breath sounds normal. No stridor. No respiratory distress. She has no wheezes.   Abdominal: Soft. Bowel sounds are normal. She exhibits no distension. There is no tenderness.   Musculoskeletal: She exhibits no edema or tenderness.   Neurological: She is alert and oriented to person, place, and time.   Skin: Skin is warm and dry. She is not diaphoretic.   Vitals reviewed.      Fluids    Intake/Output Summary (Last 24 hours) at 8/26/2019 1321  Last data filed at 8/26/2019 1200  Gross per 24 hour   Intake 910 ml   Output --   Net 910 ml       Laboratory  Recent Labs     08/25/19  0542   WBC 9.7   RBC 4.49   HEMOGLOBIN 13.5   HEMATOCRIT 43.1   MCV 96.0   MCH 30.1   MCHC 31.3*   RDW  51.8*   PLATELETCT 162*   MPV 10.9     Recent Labs     08/25/19  0542   SODIUM 141   POTASSIUM 4.0   CHLORIDE 110   CO2 21   GLUCOSE 128*   BUN 20   CREATININE 0.61   CALCIUM 9.1                   Assessment/Plan  * Stroke, lacunar (HCC)- (present on admission)  Assessment & Plan  On ASA and Lipitor  Scopolamine patch for dizziness    Vitamin D deficiency  Assessment & Plan  Vit D level 20  On supplementation    Hypomagnesemia  Assessment & Plan  F/U levels still borderline low  Continue MgOx supplements    Essential hypertension- (present on admission)  Assessment & Plan  Blood pressure controlled on Enalapril    Type 2 diabetes mellitus with hyperglycemia, without long-term current use of insulin (HCC)- (present on admission)  Assessment & Plan  HbA1c 9.8  Continue Januvia and Metformin  Serum glucose trends slowly improving w/ resumption of outpt meds     Full Code    Reviewed w/ pt and family

## 2019-08-26 NOTE — THERAPY
Physical Therapy   Daily Treatment     Patient Name: Noemi Lopez  Age:  69 y.o., Sex:  female  Medical Record #: [id number]  Today's Date: 8/26/2019     Precautions  Precautions: (P) Fall Risk  Comments: (P) Estonian speaking, diplopia, dizziness    Subjective    Pt seated in room with son present, agreeable to PT.     Objective       08/26/19 0831   Precautions   Precautions Fall Risk   Comments Estonian speaking, diplopia, dizziness   Bed Mobility    Sit to Stand Stand by Assist  (with FWW)   Neuro-Muscular Treatments   Neuro-Muscular Treatments Other (See Comments)   Comments Standing passing ball for dynamic balance, SBA and intermittent UE on parallel bars, 6 mins without LOB   Lu Balance Scale   Sitting Unsupported (Score 0-4) 4   Change Of Positon: Sitting To Standing (Score 0-4) 4   Change Of Positon: Standing To Sitting (Score 0-4) 3   Transfers (Score 0-4) 3   Standing Unsupported (Score 0-4) 4   Standing With Eyes Closed (Score 0-4) 4   Standing With Feet Together (Score 0-4) 0   Tandem Standing (Score 0-4) 0   Standing On One Leg (Score 0-4) 0   Turning Trunk (Feet Fixed) (Score 0-4) 0   Retrieving Objects From Floor (Score 0-4) 3   Turning 360 Degrees (Score 0-4) 1   Stool Stepping (Score 0-4) 0   Reaching Forward While Standing (Score 0-4) 3   Lu Balance Total Score (0-56) 29   Interdisciplinary Plan of Care Collaboration   IDT Collaboration with  Family / Caregiver;Nursing   Patient Position at End of Therapy Seated;Family / Friend in Room;Call Light within Reach   Collaboration Comments Son present and participatory during session, RN admin meds   PT Total Time Spent   PT Individual Total Time Spent (Mins) 60   PT Charge Group   PT Gait Training 1   PT Neuromuscular Re-Education / Balance 2   PT Therapeutic Activities 1     Ambulation with FWW and SBA: 250', 200', 150' and 100' with seated rest breaks  Ambulation with HHA 2x 250' and 2x125' with son providing HHA with seated rest  breaks    Discussed POC, IDT conference today, results of Lu with patient and son.      Assessment    Pt eager to walk with improvements in ambulation endurance. Continues with L lateral lean during ambulation with HHA. Difficulty with items requiring SLS on Lu with score of 29/56 indicating that pt is at increased risk for falls and requires AD for mobility at this time    Plan    Progress gait, dynamic standing balance, reinforce safety with functional mobility, cont family training for guarding, safety, and transfers

## 2019-08-26 NOTE — REHAB-PT IDT TEAM NOTE
"Physical Therapy   Mobility  Bed mobility:   SBA  Bed /Chair/Wheelchair Transfer Initial:  1 - Total Assistance  Bed /Chair/Wheelchair Transfer Current:  4 - Minimal Assistance   Bed/Chair/Wheelchair Transfer Description:  Increased time, Verbal cueing, Set-up of equipment(Bed <> WC, CGA without AD)  Walk Initial:  4 - Minimal Assistance  Walk Current:  4 - Minimal Assistance   Walk Description:  Extra time, Requires incidental assist, Supervision for safety, Verbal cueing, Walker(150' x 1 and two bouts of 75' FWW CGA-during FT)  Wheelchair Initial:  5 - Standby Prompting/Supervision or Set-up  Wheelchair Current:  5 - Standby Prompting/Supervision or Set-up   Wheelchair Description:  Extra time, Supervision for safety(Wc propulsion 150' SBA with increased time and VC)  Stairs Initial:  2 - Max Assistance  Stairs Current: 2 - Max Assistance   Stairs Description: Extra time, Verbal cueing, Hand rails, Requires incidental assist(6 4\" steps with B HR, CGA, step to pattern )  Patient/Family Training/Education:  Ongoing with family  DME/DC Recommendations:  TBD, possible FWW and manual WC  Strengths:  Independent PLOF, Making steady progress towards goals, Motivated for self care and independence, Pleasant and cooperative, Supportive family and Willingly participates in therapeutic activities  Barriers:   Decreased endurance, Poor balance and Other: diplopia, dizziness, mild impulsivity  # of short term goals set= 3  # of short term goals met=1  Physical Therapy Problems     Problem: Mobility     Dates: Start: 08/22/19       Description:     Goal: STG-Within one week, patient will ambulate community distances     Dates: Start: 08/22/19       Description: 1) Individualized goal:  250' CGA, no AD  2) Interventions:  PT Group Therapy, PT Gait Training, PT Therapeutic Exercises, PT Neuro Re-Ed/Balance and PT Therapeutic Activity       Note:     Goal Note filed on 08/26/19 1213 by Mariza Rojas, Student    250' with A      " "                  Problem: Mobility Transfers     Dates: Start: 08/22/19       Description:     Goal: STG-Within one week, patient will transfer bed to chair     Dates: Start: 08/22/19       Description: 1) Individualized goal:  Bed <> Wc, SPV, no AD  2) Interventions:  PT Group Therapy, PT Gait Training, PT Therapeutic Exercises, PT Neuro Re-Ed/Balance and PT Therapeutic Activity        Note:     Goal Note filed on 08/26/19 1213 by Mariza Rojas, Student    To be assessed                        Problem: PT-Long Term Goals     Dates: Start: 08/22/19       Description:     Goal: LTG-By discharge, patient will maintain balance     Dates: Start: 08/22/19       Description: 1) Individualized goal:  Complete standardized balance assessment with score indicating low fall risk  2) Interventions:  PT Group Therapy, PT Gait Training, PT Therapeutic Exercises, PT Neuro Re-Ed/Balance and PT Therapeutic Activity             Goal: LTG-By discharge, patient will ambulate     Dates: Start: 08/22/19       Description: 1) Individualized goal:  500' over varied surfaces, mod I, LRAD  2) Interventions:  PT Group Therapy, PT Gait Training, PT Therapeutic Exercises, PT Neuro Re-Ed/Balance and PT Therapeutic Activity             Goal: LTG-By discharge, patient will ambulate up/down flight of stairs     Dates: Start: 08/22/19       Description: 1) Individualized goal:  12 6\" steps, SPV, B HRs  2) Interventions:  PT Group Therapy, PT Gait Training, PT Therapeutic Exercises, PT Neuro Re-Ed/Balance and PT Therapeutic Activity             Goal: LTG-By discharge, patient will transfer in/out of a car     Dates: Start: 08/22/19       Description: 1) Individualized goal:  SPV, LRAD  2) Interventions:  PT Group Therapy, PT Gait Training, PT Therapeutic Exercises, PT Neuro Re-Ed/Balance and PT Therapeutic Activity                           Section completed by:  Mariza Rojas, Student; Cathleen Lewis, PT, DPT     "

## 2019-08-26 NOTE — CARE PLAN
Problem: Balance  Goal: STG-Within one week, patient will maintain dynamic standing  Description  1) Individualized goal:  10 mins, intermittent UE support, SBA  2) Interventions:  PT Group Therapy, PT Gait Training, PT Therapeutic Exercises, PT Neuro Re-Ed/Balance and PT Therapeutic Activity     Outcome: MET     Problem: Mobility  Goal: STG-Within one week, patient will ambulate community distances  Description  1) Individualized goal:  250' CGA, no AD  2) Interventions:  PT Group Therapy, PT Gait Training, PT Therapeutic Exercises, PT Neuro Re-Ed/Balance and PT Therapeutic Activity     Outcome: NOT MET  Note:   250' with HHA     Problem: Mobility Transfers  Goal: STG-Within one week, patient will transfer bed to chair  Description  1) Individualized goal:  Bed <> Wc, SPV, no AD  2) Interventions:  PT Group Therapy, PT Gait Training, PT Therapeutic Exercises, PT Neuro Re-Ed/Balance and PT Therapeutic Activity      Outcome: NOT MET  Note:   To be assessed

## 2019-08-26 NOTE — THERAPY
"Occupational Therapy  Daily Treatment     Patient Name: Noemi Lopez  Age:  69 y.o., Sex:  female  Medical Record #: [id number]  Today's Date: 8/26/2019     Precautions  Precautions: (P) Fall Risk  Comments: (P) Saudi Arabian speaking, diplopia far sighted    Safety   ADL Safety : (P) Requires Supervision for Safety  Bathroom Safety: (P) Requires Supervision for Safety  Comments: Minimal cues for safety (ie lock w/c breaks prior to standing)    Subjective    Pt was seated in W/C with family present and agreeable to therapy. Pt declined completing cooking activity and requested to \"exercise\" to increase endurance. Family present during session.      Objective       08/26/19 1301   Precautions   Precautions Fall Risk   Comments [place] speaking, diplopia far sighted   Safety    ADL Safety  Requires Supervision for Safety   Bathroom Safety Requires Supervision for Safety   Cognition    Orientation Level Oriented x 4   Level of Consciousness Alert   Vision Screen   Visual Acuity Able to read employee name badge without difficulty   Passive ROM Upper Body   Passive ROM Upper Body WDL   Active ROM Upper Body   Active ROM Upper Body  WDL   Strength Upper Body   Upper Body Strength  Not Tested   Sitting Upper Body Exercises   Upper Extremity Bike Level 3 Resistance   Comments SPT from FWW to Nustep with SBA for safety. NuStep for 11min 30 sec   Balance   Sitting Balance (Static) Good   Sitting Balance (Dynamic) Good   Standing Balance (Static) Good   Standing Balance (Dynamic) Fair +   Weight Shift Sitting Good   Weight Shift Standing Good   Skilled Intervention Verbal Cuing   OT Total Time Spent   OT Individual Total Time Spent (Mins) 30   OT Charge Group   OT Therapy Activity 1   OT Therapeutic Exercise  1     Pt ambulated to/from therapy gym using FWW. Pt demonstrates a self-correcting LOB when turing to the R with her FWW.      Pt completed visual scanning activity to locate and retrieve colored block from the wall " while standing for 15 minutes with not rest breaks using FWW. Pt demonstrated smooth pursuits to locate various colored blocks and dynamic standing balance to retrieve them from the wall. Pt reported no diplopia during activity or therapeutic exercise.     Pt educated on visual scanning exercises including using points of reference in the room for saccades and vertical/horizontal pursuits    Assessment    Pt tolerated session well. Pt demonstrated fair standing balance and increased visual tracking and visual spatial skills. Pt continues to demonstrate some dynamic standing balance deficits.     Plan    Continue overall strengthening, balance building, endurance building to increase overall safety and independence with ADL/IADL pursuits

## 2019-08-26 NOTE — THERAPY
Physical Therapy   Daily Treatment     Patient Name: Noemi Lopez  Age:  69 y.o., Sex:  female  Medical Record #: [id number]  Today's Date: 8/26/2019     Precautions  Precautions: Fall Risk  Comments: Brazilian speaking, diplopia    Subjective    Pt seated in room with son present, agreeable to PT.     Objective       08/26/19 1501   Precautions   Precautions Fall Risk   Comments Brazilian speaking, diplopia   Interdisciplinary Plan of Care Collaboration   IDT Collaboration with  Family / Caregiver   Patient Position at End of Therapy Seated;Family / Friend in Room   Collaboration Comments Son present for session   PT Total Time Spent   PT Individual Total Time Spent (Mins) 30   PT Charge Group   PT Neuromuscular Re-Education / Balance 1   PT Therapeutic Activities 1     Ambulation with FWW and SBA,150'. Ambulation with HHA, 130'     Standing dynamic balance in // bars with focus on decreasing UE support: ~15 mins including marching in place, toe taps on bolster, stepping over bolster, stepping onto AirEx pad, side stepping, with SBA/BUE support - HHA/Juvenal    Discussed discharge plan with pt and son. Questions answered to pt and son's satisfaction.      Assessment    Pt continues with L lateral lean requiring either BUE or HHA/Juvenal  to maintain balance during dynamic balance activities and ambulation.      Plan    DC 8/28- complete DC FIMS and IRF Fatmata, fall prevention and recovery training, ambulation outdoors with FWW

## 2019-08-27 DIAGNOSIS — H53.2 DOUBLE VISION: ICD-10-CM

## 2019-08-27 LAB
GLUCOSE BLD-MCNC: 133 MG/DL (ref 65–99)
GLUCOSE BLD-MCNC: 148 MG/DL (ref 65–99)
GLUCOSE BLD-MCNC: 191 MG/DL (ref 65–99)
GLUCOSE BLD-MCNC: 197 MG/DL (ref 65–99)
GLUCOSE BLD-MCNC: 279 MG/DL (ref 65–99)

## 2019-08-27 PROCEDURE — 97112 NEUROMUSCULAR REEDUCATION: CPT

## 2019-08-27 PROCEDURE — 700102 HCHG RX REV CODE 250 W/ 637 OVERRIDE(OP): Performed by: HOSPITALIST

## 2019-08-27 PROCEDURE — 99232 SBSQ HOSP IP/OBS MODERATE 35: CPT | Performed by: PHYSICAL MEDICINE & REHABILITATION

## 2019-08-27 PROCEDURE — 97535 SELF CARE MNGMENT TRAINING: CPT

## 2019-08-27 PROCEDURE — 97530 THERAPEUTIC ACTIVITIES: CPT

## 2019-08-27 PROCEDURE — A9270 NON-COVERED ITEM OR SERVICE: HCPCS | Performed by: PHYSICAL MEDICINE & REHABILITATION

## 2019-08-27 PROCEDURE — 99232 SBSQ HOSP IP/OBS MODERATE 35: CPT | Performed by: HOSPITALIST

## 2019-08-27 PROCEDURE — 82962 GLUCOSE BLOOD TEST: CPT | Mod: 91

## 2019-08-27 PROCEDURE — A9270 NON-COVERED ITEM OR SERVICE: HCPCS | Performed by: HOSPITALIST

## 2019-08-27 PROCEDURE — 770010 HCHG ROOM/CARE - REHAB SEMI PRIVAT*

## 2019-08-27 PROCEDURE — 700102 HCHG RX REV CODE 250 W/ 637 OVERRIDE(OP): Performed by: PHYSICAL MEDICINE & REHABILITATION

## 2019-08-27 RX ADMIN — METFORMIN HYDROCHLORIDE 500 MG: 500 TABLET, FILM COATED ORAL at 08:05

## 2019-08-27 RX ADMIN — METFORMIN HYDROCHLORIDE 500 MG: 500 TABLET, FILM COATED ORAL at 17:18

## 2019-08-27 RX ADMIN — VITAMIN D, TAB 1000IU (100/BT) 2000 UNITS: 25 TAB at 08:05

## 2019-08-27 RX ADMIN — SCOPALAMINE 1 PATCH: 1 PATCH, EXTENDED RELEASE TRANSDERMAL at 17:19

## 2019-08-27 RX ADMIN — ENALAPRIL MALEATE 5 MG: 5 TABLET ORAL at 04:03

## 2019-08-27 RX ADMIN — ASPIRIN 81 MG: 81 TABLET, COATED ORAL at 08:05

## 2019-08-27 RX ADMIN — MAGNESIUM OXIDE TAB 400 MG (241.3 MG ELEMENTAL MG) 400 MG: 400 (241.3 MG) TAB at 08:05

## 2019-08-27 RX ADMIN — ATORVASTATIN CALCIUM 80 MG: 40 TABLET, FILM COATED ORAL at 21:01

## 2019-08-27 RX ADMIN — SITAGLIPTIN 25 MG: 50 TABLET, FILM COATED ORAL at 08:05

## 2019-08-27 ASSESSMENT — ENCOUNTER SYMPTOMS
FEVER: 0
ABDOMINAL PAIN: 0
CHILLS: 0
NERVOUS/ANXIOUS: 0
NAUSEA: 0
SHORTNESS OF BREATH: 0
DIZZINESS: 1
DIARRHEA: 0
VOMITING: 0

## 2019-08-27 ASSESSMENT — ACTIVITIES OF DAILY LIVING (ADL)
TOILETING_LEVEL_OF_ASSIST: ABLE TO COMPLETE TOILETING WITHOUT ASSIST
TOILET_TRANSFER_LEVEL_OF_ASSIST: REQUIRES SUPERVISION WITH TOILET TRANSFER
SHOWER_TRANSFER_LEVEL_OF_ASSIST: REQUIRES SUPERVISION WITH SHOWER TRANSFER

## 2019-08-27 NOTE — DISCHARGE PLANNING
DME referral sent to [facility].  Outpatient therapy referral sent to [facility] per choice form.  Awaiting responses.

## 2019-08-27 NOTE — THERAPY
Physical Therapy   Daily Treatment     Patient Name: Noemi Lopez  Age:  69 y.o., Sex:  female  Medical Record #: [id number]  Today's Date: 8/27/2019     Precautions  Precautions: Fall Risk  Comments: Urdu speaking, diplopia    Subjective    Pt seated in room with family present, agreeable to PT.     Objective       08/27/19 0831   Precautions   Precautions Fall Risk   Comments Urdu speaking, diplopia   Bed Mobility    Supine to Sit Supervised   Sit to Supine Supervised   Sit to Stand Supervised   Scooting Independent   Rolling Independent   Interdisciplinary Plan of Care Collaboration   IDT Collaboration with  Family / Caregiver   Patient Position at End of Therapy Seated;Family / Friend in Room   Collaboration Comments multiple family members present for session   PT Total Time Spent   PT Individual Total Time Spent (Mins) 60   PT Charge Group   PT Neuromuscular Re-Education / Balance 1   PT Therapeutic Activities 3     DC FIMs and IRF Fatmata completed- see patient chart for details.    Multiple bouts of ambulation indoors and outdoors over varied surfaces from 100-300' with seated rest breaks, with FWW, SBA and VCs for obstacle navigation    Ambulation with HHA, 250'    Falls recovery training: discussed safety and sequencing for fall recovery with visual demonstration. Pt able to demonstrate safe fall recovery Sitting EOM <> Supine on floor with close SBA and VCs    Dynamic balance activity with HHA, stepping over bolsters, 24 reps.    Curb training on standard curb with FWW: 1x with SBA and verbal cuing    Discussion of safety and guarding with pt and family with family members providing guarding and cueing of pt throughout session.    Assessment    Pt with improvements in functional mobility, continues with L lateral lean and slight impulsivity during activities, requiring supervision and cueing for safety. Family able to demonstrate safe cueing and supervision of pt. Pt ready for discharge home  under supervision of family.    Plan    Standing balance, issue falls information packet, DC 8/28

## 2019-08-27 NOTE — CARE PLAN
"  Problem: Mobility Transfers  Goal: STG-Within one week, patient will transfer bed to chair  Description  1) Individualized goal:  Bed <> Wc, SPV, no AD  2) Interventions:  PT Group Therapy, PT Gait Training, PT Therapeutic Exercises, PT Neuro Re-Ed/Balance and PT Therapeutic Activity      Outcome: MET     Problem: PT-Long Term Goals  Goal: LTG-By discharge, patient will ambulate up/down flight of stairs  Description  1) Individualized goal:  12 6\" steps, SPV, B HRs  2) Interventions:  PT Group Therapy, PT Gait Training, PT Therapeutic Exercises, PT Neuro Re-Ed/Balance and PT Therapeutic Activity     Outcome: MET  Goal: LTG-By discharge, patient will transfer in/out of a car  Description  1) Individualized goal:  SPV, LRAD  2) Interventions:  PT Group Therapy, PT Gait Training, PT Therapeutic Exercises, PT Neuro Re-Ed/Balance and PT Therapeutic Activity     Outcome: MET     Problem: Mobility  Goal: STG-Within one week, patient will ambulate community distances  Description  1) Individualized goal:  250' CGA, no AD  2) Interventions:  PT Group Therapy, PT Gait Training, PT Therapeutic Exercises, PT Neuro Re-Ed/Balance and PT Therapeutic Activity     Outcome: DISCHARGED-GOAL NOT MET     Problem: PT-Long Term Goals  Goal: LTG-By discharge, patient will maintain balance  Description  1) Individualized goal:  Complete standardized balance assessment with score indicating low fall risk  2) Interventions:  PT Group Therapy, PT Gait Training, PT Therapeutic Exercises, PT Neuro Re-Ed/Balance and PT Therapeutic Activity     Outcome: DISCHARGED-GOAL NOT MET  Goal: LTG-By discharge, patient will ambulate  Description  1) Individualized goal:  500' over varied surfaces, mod I, LRAD  2) Interventions:  PT Group Therapy, PT Gait Training, PT Therapeutic Exercises, PT Neuro Re-Ed/Balance and PT Therapeutic Activity     Outcome: DISCHARGED-GOAL NOT MET     "

## 2019-08-27 NOTE — PROGRESS NOTES
Rehab Progress Note     Date of Service: 8/27/2019  Chief Complaint: follow up stroke    Interval Events (Subjective)    Patient seen and examined in the therapy gym today with multiple family members present.  We discussed plan for discharge home tomorrow.  She is going to use a front wheeled walker.  She does continue to have intermittent dizziness so we will continue the scopolamine patch after discharge.  We will also get her glucometer so her family can monitor her blood sugars to decrease risk factors for another stroke.  Patient feels ready as well as the family for discharge tomorrow.    Objective:  VITAL SIGNS: /74   Pulse 82   Temp 36.3 °C (97.4 °F) (Oral)   Resp 18   Ht 1.524 m (5')   Wt 68 kg (149 lb 14.6 oz)   SpO2 94%   BMI 29.28 kg/m²   Gen: alert, no apparent distress  CV: regular rate and rhythm, no murmurs, no peripheral edema  Resp: clear to ascultation bilaterally, normal respiratory effort  GI: soft, non-tender abdomen, bowel sounds present  Neuro: notable for double vision    Recent Results (from the past 72 hour(s))   ACCU-CHEK GLUCOSE    Collection Time: 08/24/19  5:36 PM   Result Value Ref Range    Glucose - Accu-Ck 109 (H) 65 - 99 mg/dL   ACCU-CHEK GLUCOSE    Collection Time: 08/24/19  8:30 PM   Result Value Ref Range    Glucose - Accu-Ck 222 (H) 65 - 99 mg/dL   CBC WITHOUT DIFFERENTIAL    Collection Time: 08/25/19  5:42 AM   Result Value Ref Range    WBC 9.7 4.8 - 10.8 K/uL    RBC 4.49 4.20 - 5.40 M/uL    Hemoglobin 13.5 12.0 - 16.0 g/dL    Hematocrit 43.1 37.0 - 47.0 %    MCV 96.0 81.4 - 97.8 fL    MCH 30.1 27.0 - 33.0 pg    MCHC 31.3 (L) 33.6 - 35.0 g/dL    RDW 51.8 (H) 35.9 - 50.0 fL    Platelet Count 162 (L) 164 - 446 K/uL    MPV 10.9 9.0 - 12.9 fL   Basic Metabolic Panel    Collection Time: 08/25/19  5:42 AM   Result Value Ref Range    Sodium 141 135 - 145 mmol/L    Potassium 4.0 3.6 - 5.5 mmol/L    Chloride 110 96 - 112 mmol/L    Co2 21 20 - 33 mmol/L    Glucose 128 (H)  65 - 99 mg/dL    Bun 20 8 - 22 mg/dL    Creatinine 0.61 0.50 - 1.40 mg/dL    Calcium 9.1 8.5 - 10.5 mg/dL    Anion Gap 10.0 0.0 - 11.9   MAGNESIUM    Collection Time: 08/25/19  5:42 AM   Result Value Ref Range    Magnesium 1.6 1.5 - 2.5 mg/dL   ESTIMATED GFR    Collection Time: 08/25/19  5:42 AM   Result Value Ref Range    GFR If African American >60 >60 mL/min/1.73 m 2    GFR If Non African American >60 >60 mL/min/1.73 m 2   ACCU-CHEK GLUCOSE    Collection Time: 08/25/19  8:12 AM   Result Value Ref Range    Glucose - Accu-Ck 173 (H) 65 - 99 mg/dL   ACCU-CHEK GLUCOSE    Collection Time: 08/25/19 11:33 AM   Result Value Ref Range    Glucose - Accu-Ck 166 (H) 65 - 99 mg/dL   ACCU-CHEK GLUCOSE    Collection Time: 08/25/19  5:25 PM   Result Value Ref Range    Glucose - Accu-Ck 197 (H) 65 - 99 mg/dL   ACCU-CHEK GLUCOSE    Collection Time: 08/25/19 10:04 PM   Result Value Ref Range    Glucose - Accu-Ck 162 (H) 65 - 99 mg/dL   ACCU-CHEK GLUCOSE    Collection Time: 08/26/19  8:05 AM   Result Value Ref Range    Glucose - Accu-Ck 177 (H) 65 - 99 mg/dL   ACCU-CHEK GLUCOSE    Collection Time: 08/26/19 11:23 AM   Result Value Ref Range    Glucose - Accu-Ck 219 (H) 65 - 99 mg/dL   ACCU-CHEK GLUCOSE    Collection Time: 08/26/19  4:50 PM   Result Value Ref Range    Glucose - Accu-Ck 149 (H) 65 - 99 mg/dL   ACCU-CHEK GLUCOSE    Collection Time: 08/26/19  9:18 PM   Result Value Ref Range    Glucose - Accu-Ck 125 (H) 65 - 99 mg/dL   ACCU-CHEK GLUCOSE    Collection Time: 08/27/19  3:58 AM   Result Value Ref Range    Glucose - Accu-Ck 148 (H) 65 - 99 mg/dL   ACCU-CHEK GLUCOSE    Collection Time: 08/27/19  7:39 AM   Result Value Ref Range    Glucose - Accu-Ck 191 (H) 65 - 99 mg/dL   ACCU-CHEK GLUCOSE    Collection Time: 08/27/19 11:22 AM   Result Value Ref Range    Glucose - Accu-Ck 197 (H) 65 - 99 mg/dL       Current Facility-Administered Medications   Medication Frequency   • metFORMIN (GLUCOPHAGE) tablet 500 mg BID WITH MEALS   •  magnesium oxide (MAG-OX) tablet 400 mg DAILY   • insulin regular (HUMULIN R) injection 2-12 Units 4X/DAY ACHS    And   • glucose 4 g chewable tablet 16 g Q15 MIN PRN    And   • dextrose 50% (D50W) injection 50 mL Q15 MIN PRN   • vitamin D (cholecalciferol) tablet 2,000 Units DAILY   • Respiratory Care per Protocol Continuous RT   • Pharmacy Consult Request ...Pain Management Review 1 Each PHARMACY TO DOSE   • acetaminophen (TYLENOL) tablet 650 mg Q4HRS PRN   • hydrALAZINE (APRESOLINE) tablet 10 mg Q8HRS PRN   • artificial tears ophthalmic solution 1 Drop PRN   • benzocaine-menthol (CEPACOL) lozenge 1 Lozenge Q2HRS PRN   • mag hydrox-al hydrox-simeth (MAALOX PLUS ES or MYLANTA DS) suspension 20 mL Q2HRS PRN   • ondansetron (ZOFRAN ODT) dispertab 4 mg 4X/DAY PRN    Or   • ondansetron (ZOFRAN) syringe/vial injection 4 mg 4X/DAY PRN   • traZODone (DESYREL) tablet 50 mg QHS PRN   • sodium chloride (OCEAN) 0.65 % nasal spray 2 Spray PRN   • hydrOXYzine HCl (ATARAX) tablet 50 mg Q6HRS PRN   • melatonin tablet 3 mg HS PRN   • senna-docusate (PERICOLACE or SENOKOT S) 8.6-50 MG per tablet 2 Tab BID    And   • polyethylene glycol/lytes (MIRALAX) PACKET 1 Packet QDAY PRN    And   • magnesium hydroxide (MILK OF MAGNESIA) suspension 30 mL QDAY PRN    And   • bisacodyl (DULCOLAX) suppository 10 mg QDAY PRN   • atorvastatin (LIPITOR) tablet 80 mg Q EVENING   • aspirin EC (ECOTRIN) tablet 81 mg DAILY   • SITagliptin (JANUVIA) tablet 25 mg DAILY   • scopolamine (TRANSDERM-SCOP) patch 1 Patch Q72HRS   • enalapril (VASOTEC) tablet 5 mg Q DAY       Orders Placed This Encounter   Procedures   • Diet Order Diabetic     Standing Status:   Standing     Number of Occurrences:   1     Order Specific Question:   Diet:     Answer:   Diabetic [3]     Order Specific Question:   Consistency/Fluid modifications:     Answer:   Thin Liquids [3]       Assessment:  Active Hospital Problems    Diagnosis   • *Stroke, lacunar (HCC)   • Elevated troponin    • Type 2 diabetes mellitus with hyperglycemia, without long-term current use of insulin (HCC)   • Essential hypertension     This patient is a 69 y.o. female admitted for acute inpatient rehabilitation with Stroke, lacunar (HCC).    I led and attended the weekly conference, and agree with the IDT conference documentation and plan of care as noted below.    Date of conference: 8/26/2019    Goals and barriers: See IDT note.    Biggest barriers: double vision, moves quickly     Admission FIM 78 --> 85 (8/26)    CM/social support: lots of family support    Anticipated DC date: 8/28/2019    Outpatient PT    Equip: FWW     Follow up: PCP, neuro-ophthalmology Dr. Ireland    Medical Decision Making and Plan:    Right paramedial midbrain/thalmic stroke  Non-dominant  Double vision, improved  Nystagmus, improved  Continue full rehab program  PT/OT, 1.5 hr each discipline, 5 days per week  SLP eval without cognitive deficits  Continue aspirin and statin for secondary stroke prophylaxis  Eye patch PRN  Referral made to Dr. Ireland     Dizziness, improved  Continue scopolamine patch  PRN Zofran     Hypertension  Continue Enalapril  Appreciate hospitalist assistance     Diabetes  Continue Januvia   Continue Metformin   Continue SSI  Appreciate hospitalist assistance    Vit D insufficiency  Continue supplementation    DVT prophylaxis  Discontinued Lovenox as patient is ambulating long distances    Total time:  25 minutes.  I spent greater than 50% of the time for patient care, counseling, and coordination on this date, including patient face-to face time, unit/floor time with review of records/pertinent lab data and studies, as well as discussing diagnostic evaluation/work up, planned therapeutic interventions, and future disposition of care, as per the interval events/subjective and the assessment and plan as noted above.    I have performed a physical exam, reviewed and updated ROS, as well as the assessment and plan  today 8/27/2019. In review of note from 8/26/2019 there are no new changes except as documented above.              Tereza Henderson M.D.   Physical Medicine and Rehabilitation

## 2019-08-27 NOTE — THERAPY
"Occupational Therapy  Daily Treatment     Patient Name: Noemi Lopez  Age:  69 y.o., Sex:  female  Medical Record #: [id number]  Today's Date: 8/27/2019     Precautions  Precautions: Fall Risk  Comments: Belarusian speaking, Diplopia    Safety   ADL Safety : Modified Independent  Bathroom Safety: Requires Supervision for Safety  Comments: Supervision recommended when pt transferes <> toilet and <> shower chair    Subjective    Pt agreeable to participate in OT.      Objective       08/27/19 1301   Precautions   Precautions Fall Risk   Comments Belarusian speaking, Diplopia   Cognition    Cognition / Consciousness WDL   Orientation Level Oriented x 4   Level of Consciousness Alert   Vision Screen   Visual Screen Results Diplopia   Standing Lower Body Exercises   Step Up 3 sets of 10  (1st set using 2\" step, 2nd/3rd set using 5.5\" step)   Balance   Sitting Balance (Static) Good   Sitting Balance (Dynamic) Good   Standing Balance (Static) Good   Standing Balance (Dynamic) Fair +   Interdisciplinary Plan of Care Collaboration   IDT Collaboration with  Family / Caregiver   Patient Position at End of Therapy Seated;Family / Friend in Room   Collaboration Comments multiple family members present for session   OT Total Time Spent   OT Individual Total Time Spent (Mins) 30   OT Charge Group   OT Neuromuscular Re-education / Balance 2       Assessment    Patient tolerated OT session well with focus on functional mobility and dynamic standing balance. Ambulated outside of building (~500') with FWW and SBA. 1 LOB episode noted during Step-up exercises, however patient self-corrected with FWW. Reinforced importance of family member providing supervision at home during functional transfers and ambulation activities.     Plan    Pt to d/c home tomorrow with family support.     "

## 2019-08-27 NOTE — DISCHARGE PLANNING
On 8/26/2019, met with patient and her son using  service, following Team Conference, to provide status report and discharge date set for 8/28/2019. Patient and son agreeable to plan. Choice for OP PT obtained. They really prefer [facility] OP services, but if PT booked too far out, [facility] is the second choice. No preference for DME vendor for FWW.   Discussed referral to Dr. Ireland and setting up PCP appointment.   Opportunity for questions and discussion provided. Son had question whether patient will go home on insulin. I advised to speak with Dr. Henderson about that on 8/27/2019, when she rounds.

## 2019-08-27 NOTE — PROGRESS NOTES
Hospital Medicine Daily Progress Note      Chief Complaint:  Hypertension  Diabetes    Interval History:  No significant events or changes since last visit    Review of Systems  Review of Systems   Constitutional: Negative for chills and fever.   Respiratory: Negative for shortness of breath.    Cardiovascular: Negative for chest pain.   Gastrointestinal: Negative for abdominal pain, diarrhea, nausea and vomiting.   Neurological: Positive for dizziness.   Psychiatric/Behavioral: The patient is not nervous/anxious.         Physical Exam  Temp:  [36.4 °C (97.5 °F)-36.8 °C (98.2 °F)] 36.7 °C (98.1 °F)  Pulse:  [] 92  Resp:  [18] 18  BP: (112-150)/(64-90) 112/64  SpO2:  [93 %-96 %] 96 %    Physical Exam   Constitutional: She is oriented to person, place, and time. She appears well-nourished.   HENT:   Head: Atraumatic.   Eyes: Pupils are equal, round, and reactive to light. Conjunctivae are normal.   Neck: Normal range of motion. Neck supple.   Cardiovascular: Normal rate, regular rhythm, S1 normal and S2 normal.   No murmur heard.  Pulmonary/Chest: Effort normal. She has no wheezes. She has no rales.   Abdominal: Soft. She exhibits no distension. There is no tenderness.   Musculoskeletal: She exhibits no edema.   Neurological: She is alert and oriented to person, place, and time. No sensory deficit.   Skin: Skin is warm and dry. No rash noted. No cyanosis.   Psychiatric: She has a normal mood and affect. Her behavior is normal.   Nursing note and vitals reviewed.      Fluids    Intake/Output Summary (Last 24 hours) at 8/27/2019 1120  Last data filed at 8/27/2019 0825  Gross per 24 hour   Intake 960 ml   Output --   Net 960 ml       Laboratory  Recent Labs     08/25/19  0542   WBC 9.7   RBC 4.49   HEMOGLOBIN 13.5   HEMATOCRIT 43.1   MCV 96.0   MCH 30.1   MCHC 31.3*   RDW 51.8*   PLATELETCT 162*   MPV 10.9     Recent Labs     08/25/19  0542   SODIUM 141   POTASSIUM 4.0   CHLORIDE 110   CO2 21   GLUCOSE 128*   BUN  20   CREATININE 0.61   CALCIUM 9.1                   Assessment/Plan  * Stroke, lacunar (HCC)- (present on admission)  Assessment & Plan  On ASA and Lipitor  Scopolamine patch for dizziness    Vitamin D deficiency  Assessment & Plan  Vit D: 20  On supplements    Hypomagnesemia  Assessment & Plan  M.6 () --> 1.6 ()  On supplements    Essential hypertension- (present on admission)  Assessment & Plan  BP ok  On Enalapril: 5 mg daily  Monitor    Type 2 diabetes mellitus with hyperglycemia, without long-term current use of insulin (HCC)- (present on admission)  Assessment & Plan  Hba1c: 9.8 ()  BS labile: 125-219  On Metformin: 500 mg bid --> will increase to 750 mg bid (starting )  On Januvia: 25 mg daily --> will d/c (last dose )  Note: home med was Metformin  Cont to monitor

## 2019-08-27 NOTE — DISCHARGE PLANNING
Met with patient and two sons, using  service, to discuss patient not having Medicare Part B so there is currently no reimbursement for medical equipment, physician office visits and outpatient therapy services. Dr. Henderson stated patient is not homebound and would not benefit from home health PT.   Patient needs a FWW for discharge. [employer] application and information provided in Latvian given to son. They will need to obtain patient's income statements and take proof of residency and her ID to [employer]. They are to call first.   We discussed appointment to establish with PCP at [facility] on Thursday and items needed for this too.   Patient and family are open to assistance for Medicaid application or assistance with cost of Medicare Part B. Virgen in Medicaid assistance office contacted. Virgen will contact Patient Financial Assistance to make sure they haven't worked on this. Virgen will try to meet with patient and her family prior to discharge tomorrow.   Family and patient would still like referral to Dr. Ireland and appointment to be set up at a later time, when insurance coverage may be in place to assist with the cost of this service.

## 2019-08-27 NOTE — THERAPY
Occupational Therapy  Daily Treatment     Patient Name: Noemi Lopez  Age:  69 y.o., Sex:  female  Medical Record #: [id number]  Today's Date: 8/27/2019     Precautions  Precautions: Fall Risk  Comments: Andorran speaking, Diplopia    Safety   ADL Safety : Modified Independent  Bathroom Safety: Requires Supervision for Safety  Comments: Supervision recommended when pt transferes <> toilet and <> shower chair    Subjective    Pt agreeable to participate in OT.      Objective     08/27/19 0931   Precautions   Precautions Fall Risk   Comments Andorran speaking, Diplopia   Safety    ADL Safety  Modified Independent   Bathroom Safety Requires Supervision for Safety   Comments Supervision recommended when pt transferes <> toilet and <> shower chair   Vitals   Pulse 89   Patient BP Position Sitting   Blood Pressure  112/74   Cognition    Cognition / Consciousness WDL   Orientation Level Oriented x 4   Level of Consciousness Alert   Vision Screen   Visual Screen Results Diplopia   Balance   Sitting Balance (Static) Good   Sitting Balance (Dynamic) Good   Standing Balance (Static) Good   Standing Balance (Dynamic) Fair +   Interdisciplinary Plan of Care Collaboration   IDT Collaboration with  Family / Caregiver   Patient Position at End of Therapy Seated;Family / Friend in Room   Collaboration Comments multiple family members present for session   OT Total Time Spent   OT Individual Total Time Spent (Mins) 60   OT Charge Group   OT Self Care / ADL 2   OT Neuromuscular Re-education / Balance 2     FIM Grooming Score:  6 - Modified Independent  Grooming Description:  Increased time(Patient combed hair with Mod I while seated in w/c)    FIM Bathing Score:  6 - Modified Independent  Bathing Description:   FIM Bathing Score:  6 - Modified Independent  Bathing Description:  Grab bar, Tub bench, Hand rails, Hand held shower, Increased time(Patient washed, rinsed and dried all body parts with mod I while incorporating sitting  and standing with grab bar for steadying assist. )    FIM Upper Body Dressin - Modified Independent  Upper Body Dressing Description:  Increased time(Patient demonstrated ability to retrieve clothing from closet and donned bra and long-sleeved shirt with mod I while seated in w/c )    FIM Lower Body Dressing Score:  6 - Modified Independent  Lower Body Dressing Description:  Increased time(Patient demonstrated ability to retrieve clothing from closet  and donned underwear , pants and non-skid socks with mod I while seated in w/c.)    FIM Tub/Shower Transfers Score:  5 - Standby Prompting/Supervision or Set-up  Tub/Shower Transfers Description:      Patient tolerated bean bag toss while standing in // bars with SBA. Patient threw bean bags 4.5' away from bucket (as >4.5' pt reported diplopia present)      Assessment    Pt tolerated OT session well with focus on ADLs, continued family/caregiver education, and functional mobility/dynamic standing balance. Patient's daughter present for shower and assisted patient with safely performing task. No safety concerns noted. Discussed importance of caregiver being present during functional transfers and ambulation tasks at home, as patient presents with decreased dynamic standing balance (with periodic L lateral lean).     Plan    D/C home tomorrow with family support

## 2019-08-27 NOTE — CARE PLAN
Problem: Venous Thromboembolism (VTW)/Deep Vein Thrombosis (DVT) Prevention:  Goal: Patient will participate in Venous Thrombosis (VTE)/Deep Vein Thrombosis (DVT)Prevention Measures  Outcome: PROGRESSING AS EXPECTED  Note:   Pt is ambulating frequently; Lovenox discontinued per MD. No s/s of DVT or edema present. Will continue to monitor.     Problem: Discharge Barriers/Planning  Goal: Patient's continuum of care needs will be met  Outcome: PROGRESSING AS EXPECTED  Note:   Pt is scheduled to discharge home on Wednesday 8/28. Pt will require outpatient PT and follow up with neurophthalmologist but has very supportive extended family. Pt and family aware of and in agreement with treatment plan.

## 2019-08-27 NOTE — CARE PLAN
Problem: OT Long Term Goals  Goal: LTG-By discharge, patient will perform bathroom transfers  Description  1) Individualized Goal:  Mod I with AE/DME PRN  2) Interventions:  OT Group Therapy, OT Self Care/ADL, OT Cognitive Skill Dev, OT Community Reintegration, OT Neuro Re-Ed/Balance, OT Sensory Int Techniques, OT Therapeutic Activity and OT Evaluation     Outcome: NOT MET  Note:   SBA recommended for safety and use of grab bar       Problem: Bathing  Goal: STG-Within one week, patient will bathe  Description  1) Individualized Goal:  Mod I with AE/DME PRN  2) Interventions:  OT Group Therapy, OT Self Care/ADL, OT Cognitive Skill Dev, OT Community Reintegration, OT Neuro Re-Ed/Balance, OT Sensory Int Techniques, OT Therapeutic Activity and OT Evaluation     Outcome: MET     Problem: OT Long Term Goals  Goal: LTG-By discharge, patient will complete basic self care tasks  Description  1) Individualized Goal:  Mod I with AE/DME PRN  2) Interventions:  OT Group Therapy, OT Self Care/ADL, OT Cognitive Skill Dev, OT Community Reintegration, OT Neuro Re-Ed/Balance, OT Sensory Int Techniques, OT Therapeutic Activity and OT Evaluation     Outcome: MET

## 2019-08-27 NOTE — THERAPY
"Physical Therapy   Daily Treatment     Patient Name: Noemi Lopez  Age:  69 y.o., Sex:  female  Medical Record #: [id number]  Today's Date: 8/27/2019     Precautions  Precautions: Fall Risk  Comments: Venezuelan speaking, Diplopia    Subjective    Pt seated in wc, sleeping upon arrival, multiple family members present in room, agreeable to PT once aroused.     Objective       08/27/19 1401   Precautions   Precautions Fall Risk   Comments Venezuelan speaking, Diplopia   Neuro-Muscular Treatments   Neuro-Muscular Treatments Weight Shift Right;Weight Shift Left   Interdisciplinary Plan of Care Collaboration   IDT Collaboration with  Family / Caregiver;   Patient Position at End of Therapy Seated;Family / Friend in Room   Collaboration Comments multiple family members present for session   PT Total Time Spent   PT Individual Total Time Spent (Mins) 30   PT Charge Group   PT Neuromuscular Re-Education / Balance 1   PT Therapeutic Activities 1     Ambulation with FWW, SBA, 2x~130' with seated rest breaks.    Standing weightshift activity in // bars for neuro-re-ed/dynamic balance: ~10 mins including bilateral forward stepping using dots for visual cueing and toe taps on 4\" step without UE support, CGA-min A at hips and manual cueing at pelvis for weightshift to R/correction of L lateral lean.    Issued and reviewed falls information packet and home exercise handout with pt and family.    Assessment    Pt responded well to manual cueing during standing weightshift activity with in-session improvements in stability during SLS.     Plan    Dc home tomorrow with family support.    "

## 2019-08-27 NOTE — PROGRESS NOTES
Pt requested to have her blood sugar checked, it was 148 at this time.  Complains of some dizziness, /90 and , Vasotec given at this time.  Will recheck blood pressure and continue to monitor patient.

## 2019-08-28 VITALS
HEIGHT: 60 IN | BODY MASS INDEX: 29.43 KG/M2 | TEMPERATURE: 97.3 F | SYSTOLIC BLOOD PRESSURE: 110 MMHG | DIASTOLIC BLOOD PRESSURE: 60 MMHG | WEIGHT: 149.91 LBS | RESPIRATION RATE: 18 BRPM | HEART RATE: 70 BPM | OXYGEN SATURATION: 95 %

## 2019-08-28 PROBLEM — E83.42 HYPOMAGNESEMIA: Status: RESOLVED | Noted: 2019-08-24 | Resolved: 2019-08-28

## 2019-08-28 PROBLEM — R79.89 ELEVATED TROPONIN: Status: RESOLVED | Noted: 2019-08-19 | Resolved: 2019-08-28

## 2019-08-28 LAB
GLUCOSE BLD-MCNC: 163 MG/DL (ref 65–99)
GLUCOSE BLD-MCNC: 310 MG/DL (ref 65–99)

## 2019-08-28 PROCEDURE — 82962 GLUCOSE BLOOD TEST: CPT

## 2019-08-28 PROCEDURE — A9270 NON-COVERED ITEM OR SERVICE: HCPCS | Performed by: HOSPITALIST

## 2019-08-28 PROCEDURE — 99232 SBSQ HOSP IP/OBS MODERATE 35: CPT | Performed by: HOSPITALIST

## 2019-08-28 PROCEDURE — A9270 NON-COVERED ITEM OR SERVICE: HCPCS | Performed by: PHYSICAL MEDICINE & REHABILITATION

## 2019-08-28 PROCEDURE — 700102 HCHG RX REV CODE 250 W/ 637 OVERRIDE(OP): Performed by: HOSPITALIST

## 2019-08-28 PROCEDURE — 700102 HCHG RX REV CODE 250 W/ 637 OVERRIDE(OP): Performed by: PHYSICAL MEDICINE & REHABILITATION

## 2019-08-28 PROCEDURE — 99239 HOSP IP/OBS DSCHRG MGMT >30: CPT | Performed by: PHYSICAL MEDICINE & REHABILITATION

## 2019-08-28 RX ORDER — ASPIRIN 81 MG/1
81 TABLET ORAL DAILY
Qty: 30 TAB | Refills: 0 | COMMUNITY
Start: 2019-08-28

## 2019-08-28 RX ORDER — SCOLOPAMINE TRANSDERMAL SYSTEM 1 MG/1
1 PATCH, EXTENDED RELEASE TRANSDERMAL
Qty: 4 PATCH | Refills: 3 | Status: SHIPPED | OUTPATIENT
Start: 2019-08-30 | End: 2019-10-09

## 2019-08-28 RX ORDER — ATORVASTATIN CALCIUM 80 MG/1
80 TABLET, FILM COATED ORAL EVERY EVENING
Qty: 30 TAB | Refills: 0 | Status: SHIPPED | OUTPATIENT
Start: 2019-08-28

## 2019-08-28 RX ORDER — ENALAPRIL MALEATE 5 MG/1
5 TABLET ORAL DAILY
Qty: 30 TAB | Refills: 0 | Status: SHIPPED | OUTPATIENT
Start: 2019-08-29

## 2019-08-28 RX ADMIN — ASPIRIN 81 MG: 81 TABLET, COATED ORAL at 08:10

## 2019-08-28 RX ADMIN — VITAMIN D, TAB 1000IU (100/BT) 2000 UNITS: 25 TAB at 08:10

## 2019-08-28 RX ADMIN — METFORMIN HYDROCHLORIDE 850 MG: 850 TABLET, FILM COATED ORAL at 08:10

## 2019-08-28 RX ADMIN — MAGNESIUM OXIDE TAB 400 MG (241.3 MG ELEMENTAL MG) 400 MG: 400 (241.3 MG) TAB at 08:10

## 2019-08-28 RX ADMIN — ENALAPRIL MALEATE 5 MG: 5 TABLET ORAL at 05:27

## 2019-08-28 ASSESSMENT — ENCOUNTER SYMPTOMS
HEADACHES: 0
NAUSEA: 0
HALLUCINATIONS: 0
DIZZINESS: 1
SHORTNESS OF BREATH: 0
BLURRED VISION: 0
VOMITING: 0
PALPITATIONS: 0
FEVER: 0

## 2019-08-28 NOTE — PROGRESS NOTES
Patient discharged to home per order.  Discharge instructions reviewed with patient and family;  in room they verbalize understanding and signed copies placed in chart.  Patient has all belongings; signed copy of form in chart.  Patient left facility at 1500 via FWW accompanied by rehab staff and family.  Have enjoyed working with this pleasant patient.

## 2019-08-28 NOTE — DISCHARGE PLANNING
Case Management Discharge Instructions  Discharge Date August 28, 2019                 NOTE: This information can be found in your final discharge packet that your nurse will give you.         Follow-up Information:     Ramez Traylor, APRN  [address]  [address]  [facility] [address]  [phone]  Primary Care - thursday, August 29, 2019 @ 3:30PM check in, 4PM appointment. Need to take 2 months proof of income, ID and  proof of address.      Moshe Ireland M.D.  [address] [place] [address] [address]  [facility] [id number] [phone]  Neuro Ophthalmologist   Office to call you to schedule an appointment.

## 2019-08-28 NOTE — PROGRESS NOTES
Hospital Medicine Daily Progress Note      Chief Complaint:  Hypertension  Diabetes    Interval History:  No significant events or changes since last visit    Review of Systems  Review of Systems   Constitutional: Negative for fever.   Eyes: Negative for blurred vision.   Respiratory: Negative for shortness of breath.    Cardiovascular: Negative for palpitations.   Gastrointestinal: Negative for nausea and vomiting.   Neurological: Positive for dizziness. Negative for headaches.   Psychiatric/Behavioral: Negative for hallucinations.        Physical Exam  Temp:  [36.3 °C (97.3 °F)-36.7 °C (98.1 °F)] 36.3 °C (97.3 °F)  Pulse:  [70-92] 70  Resp:  [18] 18  BP: (110-124)/(60-74) 110/60  SpO2:  [94 %-96 %] 95 %    Physical Exam   HENT:   Mouth/Throat: Oropharynx is clear and moist.   Eyes: No scleral icterus.   Cardiovascular: Normal rate and regular rhythm.   No murmur heard.  Pulmonary/Chest: Effort normal and breath sounds normal. No stridor.   Abdominal: Soft. She exhibits no distension. There is no tenderness.   Musculoskeletal: She exhibits no edema.   Skin: Skin is warm. No rash noted. She is not diaphoretic.   Psychiatric: Her behavior is normal.   Nursing note and vitals reviewed.      Fluids    Intake/Output Summary (Last 24 hours) at 2019 0739  Last data filed at 2019 0527  Gross per 24 hour   Intake 900 ml   Output --   Net 900 ml       Laboratory                        Assessment/Plan  * Stroke, lacunar (HCC)- (present on admission)  Assessment & Plan  On ASA and Lipitor  Scopolamine patch for dizziness    Vitamin D deficiency  Assessment & Plan  Vit D: 20  On supplements    Hypomagnesemia  Assessment & Plan  M.6 () --> 1.6 ()  On supplements    Essential hypertension- (present on admission)  Assessment & Plan  BP ok  On Enalapril: 5 mg daily  Monitor    Type 2 diabetes mellitus with hyperglycemia, without long-term current use of insulin (HCC)- (present on admission)  Assessment &  Plan  Hba1c: 9.8 (8/18)  On Metformin: 500 mg bid --> 750 mg bid (8/28)  Off Januvia: 25 mg daily (last dose 8/27)  Note: home med was Metformin  Cont to monitor

## 2019-08-28 NOTE — PROGRESS NOTES
Patient care assumed. Report received from [facility]ST[facility] Hoffmann. Patient is alert and calm, resting in bed. Call light and bedside table within reach. Will continue to monitor.

## 2019-08-28 NOTE — DISCHARGE INSTRUCTIONS
[facility] NURSING DISCHARGE INSTRUCTIONS    Blood Pressure : 110/60  Weight: 68 kg (149 lb 14.6 oz)  Nursing recommendations for Noemi Lopez at time of discharge are as follows:  Family Member verbalized understanding of all discharge instructions and prescriptions.     Review all your home medications and newly ordered medications with your doctor and/or pharmacist. Follow medication instructions as directed by your doctor and/or pharmacist.    Pain Management:   Discharge Pain Medication Instructions:  Comfort Goal: Comfort at Rest, Comfort with Movement, Perform Activity  Notify your primary care provider if pain is unrelieved with these measures, if the pain is new, or increased in intensity.    Discharge Skin Characteristics: Warm, Dry  Discharge Skin Exam: Clear     Skin / Wound Care Instructions: Please contact your primary care physician for any change in skin integrity. clear    If You Have Surgical Incisions / Wounds:  Monitor surgical site(s) for signs of increased swelling, redness or symptoms of drainage from the site or fever as this could indicate signs and symptoms of infection. If these symptoms are noted, notifiy your primary care provider.      Discharge Safety Instructions: Should Not Be Left Alone In The House     Discharge Safety Concerns: Unsteady Gait  The interdisciplinary team has made recommendation that you should have adult supervision in the house due to unsteady gait  Anti-embolic stockings are required during the day and off at night to increase circulation to the lower extremities.    Discharge Diet: diabetic     Discharge Liquids: thin  Discharge Bowel Function: Continent  Please contact your primary care physician for any changes in bowel habits.  Discharge Bowel Program:    Discharge Bladder Function: Continent  Discharge Urinary Devices:        Nursing Discharge Plan:   Influenza Vaccine Indication: Indicated: Not available from  distributor/    Case Management Discharge Instructions:   Discharge Location:    Agency Name/Address/Phone:    Home Health:    Outpatient Services:    DME Provider/Phone:    Medical Equipment Ordered:    Prescription Faxed to:        Discharge Medication Instructions:  Below are the medications your physician expects you to take upon discharge:    Control del nivel de glucosa en la anna - Adultos  (Blood Glucose Monitoring, Adult)  El control de la glucosa en la anna (también llamada azúcar en la anna) lo ayudará a tener la diabetes bajo control. También ayuda a que usted y el médico controlen la diabetes y determinen si el tratamiento es eficaz.  ¿POR QUÉ HAY QUE CONTROLAR LA GLUCOSA EN LA ANNA?  · [place] puede ayudar a comprender de qué manera los alimentos, la actividad física y los medicamentos inciden en los niveles de glucosa.  · Le permite conocer el nivel de glucosa en la anna en cualquier momento dado. Puede saber rápidamente si el nivel es bajo (hipoglucemia) o alto (hiperglucemia).  · Puede ser de ayuda para que usted y el médico sepan cómo ajustar los medicamentos,  · y para entender cómo controlar vero enfermedad o ajustar los medicamentos para hacer ejercicio.  ¿CUÁNDO DEBE HACERSE LAS PRUEBAS?  El médico lo ayudará a decidir con qué frecuencia deberá controlar los niveles de glucosa en la anna. [place] puede depender del tipo de diabetes que tenga, ann control de la diabetes o los tipos de medicamentos que tome. Asegúrese de anotar todos los valores de la glucosa en la anna, de modo que esta información pueda ser revisada por ann médico. A continuación puede sarita ejemplos de los momentos para realizar la prueba que el médico puede sugerir.  Diabetes tipo 1  · Mídaselo al menos 2 veces al día si la diabetes está danny controlada, si usa vero bomba de insulina o si se aplica muchas inyecciones diarias.  · Si la diabetes no está danny controlada o si está enfermo, puede ser necesario que se  controle con más frecuencia.  · Es recomendable que también lo mida en estas oportunidades:  ¨ Antes de cada inyección de insulina.  ¨ Antes y después de hacer ejercicio.  ¨ Entre las comidas y 2 horas después de comer.  ¨ Ocasionalmente, entre las 2:00 a. m. y las 3:00 a. m.  Diabetes tipo 2  · Si está utilizando insulina, realice la medición al menos 2 veces al día. Sin embargo, es mejor hacer vero medición antes de cada inyección de insulina.  · Si liseth medicamentos por boca (vía oral), hágase la prueba 2 veces por día.  · Si sigue vero dieta controlada, hágase la prueba vero vez por día.  · Si la diabetes no está danny controlada o si está enfermo, puede ser necesario que se controle con más frecuencia.  CÓMO CONTROLAR EL NIVEL DE GLUCOSA EN LA ANNA  Insumos necesarios  · Medidor de glucosa en la anna.  · Tiras reactivas para el medidor. Cada medidor tiene jessenia propias tiras reactivas. Debe usar las tiras reactivas correspondientes a ann medidor.  · Vero aguja para pinchar (lanceta).  · Un dispositivo que sujeta la lanceta (dispositivo de punción).  · Un diario o libro de anotaciones para anotar los resultados.  Procedimiento  · Lave jessenia gerald con agua y jabón. No se recomienda usar alcohol.  · Pínchese el costado del dedo (no la punta) con la lanceta.  · Apriete suavemente el dedo hasta que aparezca vero pequeña gota de anna.  · Siga las instrucciones que vienen con el medidor para insertar la anabela reactiva, aplicar la anna sobre la anabela y usar el medidor de glucosa en la anna.  Otras zonas de las que se puede alex anna para la prueba  Algunos medidores le permiten alex anna para la prueba de otras zonas del cuerpo (que no son el dedo). Estas áreas se llaman sitios alternativos. Los sitios alternativos más comunes son los siguientes:  · El antebrazo.  · El muslo.  · La brian posterior de la parte inferior de la pierna.  · La mercado de la mano.  El flujo de anna en estas zonas es más lento. Por lo tanto,  "los valores de glucosa en la anna que obtenga pueden estar demorados, y los números son diferentes de los que obtiene de los dedos. No saque anna de sitios alternativos si vj que tiene hipoglucemia. Los valores no serán precisos. Siempre extraiga del dedo si tiene hipoglucemia. Además, si no puede darse cuenta cuando tiene bajos los niveles (hipoglucemia asintomática), siempre extraiga anna de los dedos para los controles de glucosa en la anna.  CONSEJOS ADICIONALES PARA EL CONTROL DE LA GLUCOSA  · No vuelva a utilizar las lancetas.  · Siempre tenga los insumos a mano.  · Todos los medidores de glucosa incluyen un número de teléfono \"directo\", disponible las 24 horas, al que podrá llamar si tiene preguntas o necesita ayuda.  · Ajuste (calibre) el medidor de glucosa con vero solución de control después de terminar algunas laverne de tiras reactivas.  LLEVE REGISTROS DE LOS NIVELES DE GLUCOSA EN LA ANNA  Es recomendable llevar un diario o un registro de los valores de glucosa en la anna. La mayoría de los medidores de glucosa, sino todos, conservan el registro de la glucosa en el dispositivo. Algunos medidores permiten descargar los registros a ann computadora. Llevar un registro de los valores de glucosa en la anna es especialmente útil si desea observar los patrones. [place] anotaciones simultáneas con la lectura de los valores de glucosa en la anna debido a que podría olvidar lo que ocurrió en el momento exacto. Llevar un buen registro los ayudará a usted y al médico a trabajar juntos para lograr un buen control de la diabetes.      Esta información no tiene amelia fin reemplazar el consejo del médico. Asegúrese de hacerle al médico cualquier pregunta que tenga.     Document Released: 12/18/2006 Document Revised: 01/08/2016  Elsevier Interactive Patient Education ©2016 Elsevier Inc.    Hipoglucemia  (Hypoglycemia)  La hipoglucemia se produce cuando el nivel de azúcar (glucosa) en la anna es demasiado " bajo. Los síntomas de la glucemia baja pueden incluir los siguientes:  · Sentir que tiene lo siguiente:  ¨ Apetito.  ¨ Preocupación o nervios (ansioso).  ¨ Sudoración y piel húmeda.  ¨ Confusión.  ¨ Mareos.  ¨ Somnolencia.  ¨ Náuseas.  · Tener lo siguiente:  ¨ Latidos cardíacos acelerados.  ¨ Dolor de giovana.  ¨ Cambios en la visión.  ¨ Vero crisis de movimientos que no puede controlar (convulsiones).  ¨ [place].  ¨ Hormigueo y falta de sensibilidad (adormecimiento) alrededor de la boca, los labios o la lengua.  · Dificultades para hacer lo siguiente:  ¨ Hablar.  ¨ Prestar atención (concentrarse).  ¨ Moverse (coordinación).  ¨ Dormir.  · Temblores.  · Desmayos.  · Molestarse con facilidad (irritabilidad).  Las personas que tienen diabetes y las que no tienen la enfermedad pueden tener la glucemia baja. El nivel bajo de glucemia en la anna puede ocurrir rápidamente y ser vero emergencia.  Tratamiento de la glucemia baja   Generalmente, el tratamiento de la glucemia baja consiste en ingerir de inmediato un alimento o vero bebida que contengan azúcar. Si puede pensar con claridad y tragar de manera priest, siga la kerry 15/15, que consiste en lo siguiente:  · Consumir 15 gramos de un hidrato de carbono de acción rápida. Algunos hidratos de carbono de acción rápida son los siguientes:  ¨ 1 pomo de glucosa en gel.  ¨ 3 comprimidos de azúcar (comprimidos de glucosa).  ¨ 6 a 8 unidades de caramelos duros.  ¨ 4 onzas (120 ml) de jugo de frutas.  ¨ 4 onzas (120 ml) de gaseosa común (no dietética).  · Contrólese la glucemia 15 minutos después de ingerir el hidrato de carbono.  · Si el nivel de glucosa en la anna todavía es igual o nilesh que 70 mg/dl (3,9 mmol/l), ingiera nuevamente 15 gramos de un hidrato de carbono.  · Si el nivel de glucosa en la anna no supera los 70 mg/dl (3,9 mmol/l) después de 3 intentos, solicite ayuda de inmediato.  · Ingiera vero comida o vero colación en el transcurso de 1 hora después de que la  glucemia se haya normalizado.  Tratamiento de la glucemia muy baja   Si el nivel de glucosa en la anna es igual o nilesh que 54 mg/dl (3 mmol/l), significa que está muy bajo (hipoglucemia grave). [place] es vero emergencia. No espere hasta que los síntomas desaparezcan. Solicite atención médica de inmediato. Comuníquese con el servicio de emergencias de ann localidad (911 en los [place]). No conduzca por jessenia propios medios hasta el hospital.  Si ann nivel de glucosa en la anna muy bajo y no puede ingerir ningún alimento ni bebida, jase vez deba aplicarse vero inyección de glucagón. Un familiar o un amigo deben aprender a controlarle la glucemia y a aplicarle vero inyección de glucagón. Pregúntele al médico si debe tener un kit de inyecciones de glucagón en ann casa.  CUIDADOS EN EL HOGAR  Instrucciones generales   · Evite cualquier dieta que le impida ingerir la cantidad suficiente de comida. Hable con el médico antes de comenzar vero dieta nueva.  · [place] los medicamentos de venta arlyn y los recetados solamente amelia se lo haya indicado el médico.  · Limite el consumo de alcohol a no más de 1 medida por día si es adela y no está embarazada y a 2 medidas por día si es hombre. Vero medida equivale a 12 onzas de cerveza, 5 onzas de vino o 1½ onzas de bebidas alcohólicas de martha graduación.  · Concurra a todas las visitas de control amelia se lo haya indicado el médico. [place] es importante.  Si usted tiene diabetes:   · Asegúrese de conocer los síntomas de la hipoglucemia.  · Siempre tenga a mano vero keisha de azúcar, amelia por ejemplo:  ¨ Azúcar.  ¨ Comprimidos de azúcar.  ¨ Gel de glucosa.  ¨ Jugo de frutas.  ¨ Gaseosa común (gaseosa que no sea dietética).  ¨ Leche.  ¨ Caramelos duros.  ¨ Miel.  · [place] los medicamentos según las indicaciones.  · Siga el plan de ejercicios y de alimentación.  ¨ Coma a horario. No omita comidas.  ¨ Siga el plan para los días de enfermedad cuando no pueda comer o beber normalmente. Arme kamila  plan de antemano con el médico.  · Contrólese la glucemia con la frecuencia que le haya indicado el médico. Contrólesela siempre antes y después de hacer actividad física.  · Comparta ann plan de atención de la diabetes con estas personas:  ¨ Compañeros de trabajo o de la escuela.  ¨ Aquellas con las que convive.  · Hágase análisis de orina para detectar la presencia de cetonas:  ¨ Cuando esté enfermo.  ¨ Christos se lo haya indicado el médico.  · Lleve consigo vero tarjeta, o use un brazalete o vero medalla que indiquen que es diabético.  Si tiene un nivel bajo de glucosa en la anna debido a otras causas:   · Contrólese la glucemia con la frecuencia que le haya indicado el médico.  · Siga las indicaciones del médico respecto de lo que no puede comer o beber.  SOLICITE AYUDA SI:  · Tiene problemas para mantener el nivel de glucosa en la anna dentro del rango indicado.  · Tiene la glucemia baja con frecuencia.  SOLICITE AYUDA DE INMEDIATO SI:  · Continúa teniendo síntomas después de cindy comido o ingerido algo con azúcar.  · La glucemia es igual o inferior a 54 mg/dl (3 mmol/dl).  · Tiene vero crisis de movimientos que no puede controlar.  · Se desmaya.  Estos síntomas pueden indicar vero emergencia. No espere hasta que los síntomas desaparezcan. Solicite atención médica de inmediato. Comuníquese con el servicio de emergencias de ann localidad (911 en los [place]). No conduzca por jessenia propios medios hasta el hospital.   Esta información no tiene christos fin reemplazar el consejo del médico. Asegúrese de hacerle al médico cualquier pregunta que tenga.  Document Released: 01/20/2012 Document Revised: 04/10/2017 Document Reviewed: 01/20/2017  [employer] Interactive Patient Education © 2017 [employer]    Dejar de fumar, consejos para lograrlo  (Smoking Cessation, Tips for Success)  Si está preparado para dejar de fumar, ¡felicitaciones! Apodaca elegido tener vero daniel más keiry. El cigarrillo aporta nicotina, alquitrán,  "monóxido de carbono y otras sustancias irritantes al organismo. Los pulmones, el corazón y los vasos sanguíneos funcionarán mejor sin estas sustancias tóxicas. Hay muchas formas de dejar de fumar. La goma de mascar, los parches, los inhaladores con nicotina o un aerosol nasal lo ayudarán a superar el deseo físico. La hipnosis, los grupos de apoyo y algunos medicamentos también pueden ayudarlo.  ¿QUÉ PUEDO HACER PARA QUE SEA MÁS FÁCIL?   A continuación le brindamos algunos consejos:  · Seleccione vero fecha en la que dejará de fumar por completo. Cuéntele a jessenia amigos y familiares ann plan de abandonar el hábito en naila fecha.  · No intente reducir progresivamente la cantidad de cigarrillos que fuma. Elija vero fecha para dejar y abandone el hábito por completo desde pamela día.  · Deseche todos los cigarrillos.  · Limpie y deshágase de todos los margo de ann casa, ann lugar de trabajo y ann automóvil.  · Escriba los motivos para abandonar el hábito en vero tarjeta. Lleve naila tarjeta encima y léala cuando sienta ganas de fumar.  · Elimine la nicotina de ann organismo. Leyla gran cantidad de líquido para mantener la orina de geri maya o color amarillo pálido. Hágalo después de abandonar para limpiar la nicotina de ann organismo.  · Aprenda a predecir jessenia estados de ánimo. No deje que un mal momento sea vero excusa para fumar. Algunas situaciones podrán tentarlo para fumar.  · Nunca diga \"solo savana\". Lo llevará a querer otro más y otro más. Recuérdese a usted mismo ann decisión de dejar de fumar.  · Modifique los hábitos asociados con el cigarrillo. Si fuma mientras conduce o cuando se siente estresado, busque otras actividades que reemplacen al cigarrillo. Leyla el café de pie. Cepíllese los dientes después de comer. Siéntese en vero silla diferente para leer el periódico. Evite beber alcohol mientras intenta abandonar y consuma nilesh cantidad de bebidas que contengan cafeína. El alcohol y la cafeína podrían darle ganas de " "fumar.  · Evite los alimentos y las bebidas que puedan activar el deseo de fumar, por ejemplo, el alcohol y los alimentos dulces o picantes.  · [place] a la gente que fuma que no lo tremayne a ann alrededor.  · Planifique algo para hacer ni danny termina de comer o cuando liseth vero taza de café. Por ejemplo, tamiko vero caminata o hacer ejercicio.  · Practique ejercicios de relajación para calmarse y disminuir el estrés. Recuerde, puede estar tenso y nervioso ernst las dos primeras semanas lucia esto pasará.  · Encuentre nuevas actividades para mantener las gerald ocupadas. Juegue con vero lapicera, vero moneda o vero bandita elástica. Garabatee o dibuje en un papel.  · Cepíllese los dientes inmediatamente después de comer. Media lo ayudará a eliminar el deseo del tabaco luego de las comidas. También puede usar enjuague bucal.  · Use sustitutos orales para reemplazar los cigarrillos. Pruebe con caramelos de kaia, zanahorias, ramas de aris o goma de mascar. Téngalos a mano para poder recurrir a ellos cuando sienta deseos de fumar.  · Cuando tenga ganas de fumar, tremayne vero respiración profunda.  · Designe ann hogar amelia área de no fumadores.  · Si fuma mucho, solicite a ann médico que le recete goma de mascar con nicotina. Puede ser útil para atravesar el período de abstinencia de nicotina.  · Recompénsese. Ahorre el dinero que usaría para comprar cigarrillos y cómprese algo que le agrade.  · Busque apoyo en otras personas. Únase a un sandoval de apoyo o programa para dejar de fumar. Pídale a otra persona de ann hogar o del trabajo que lo ayude con ann plan para dejar de fumar.  · Siempre pregúntese: \"¿Necesito kamila cigarrillo o solo es un reflejo?\" Dígase a sí mismo: \"Hoy elijo no fumar\" o \"No quiero fumar\". Es vero forma de recordarse ann decisión de dejar de fumar.  · No reemplace los cigarrillos comunes por cigarrillos electrónicos. La seguridad de los cigarrillos electrónicos se desconoce, y podrían tener químicos tóxicos.  · Si sufre " vero recaída ¡no abandone! Anticípese, planifique y piense qué hará la próxima vez que sienta deseos de fumar.  ¿CÓMO ME SENTIRÉ CUANDO DEJE DE FUMAR?  Podrá sufrir síndrome de abstinencia porque ann cuerpo está acostumbrado a la nicotina (la sustancia adictiva que contienen los cigarrillos). Podrá sentir el deseo compulsivo de fumar, irritabilidad, enojo, tos, cefaleas y dificultad para concentrarse. Estos síntomas son transitorios. Son más intensos al principio, maura desaparecerán en 10 a 14 días. Cuando aparece el síndrome de abstinencia, mantenga el control. Piense en los motivos que tiene para dejar de fumar. Recuérdese a usted mismo que estos son signos de que ann organismo se está curando y se está acostumbrando a vivir sin fumar. Recuerde que el síndrome de abstinencia es más fácil de tratar que las graves enfermedades que causa el cigarrillo.   Aun después que los síntomas de abstinencia hayan desaparecido, habrá momentos en los que tendrá ganas de fumar. Maura kamila deseo será breve y desaparecerá, fume o no. ¡No fume!  ¿CUÁLES SON LOS RECURSOS DISPONIBLES PARA ABANDONAR EL HÁBITO DE FUMAR?  Ann médico puede indicarle recursos comunitarios u hospitales donde recibir apoyo, por ejemplo:  · Grupos de apoyo.  · Educación.  · Hipnosis.  · Terapia.     Esta información no tiene amelia fin reemplazar el consejo del médico. Asegúrese de hacerle al médico cualquier pregunta que tenga.     Document Released: 06/05/2009 Document Revised: 01/08/2016  [employer] Interactive Patient Education ©2016 [employer]  Diplopia  (Diplopia)  La diplopia es un trastorno que se caracteriza por la visión doble, es decir, sarita dos objetos donde hay savana solo. Existen muchas causas de diplopia. Algunas no son peligrosas y pueden corregirse fácilmente. La diplopia también puede ser un síntoma de un problema médico grave.  Hay dos tipos de diplopia.  · Diplopia monocular. En kamila tipo, la visión doble afecta un solo calvin. La diplopia monocular a  menudo es consecuencia de vero opacidad en el cristalino del calvin (catarata) o de alteraciones en el modo en el que el calvin direcciona la nima.  · Diplopia binocular. En kamila tipo, la visión doble afecta ambos ojos. Sin embargo, al cerrar un calvin, la visión doble desaparece. La diplopia binocular puede ser más grave. Las causas pueden ser las siguientes:  ¨ Problemas en los nervios o los músculos responsables del movimiento de los ojos.  ¨ Enfermedades neurológicas.  ¨ Problemas de tiroides.  ¨ Tumores.  ¨ Vero infección cerca de los ojos.  ¨ Ictus.  Es posible que deba consultar a un médico especialista en enfermedades oculares (oftalmólogo) o del sistema nervioso (neurólogo) para hallar la causa.  INSTRUCCIONES PARA EL CUIDADO EN EL HOGAR  · Informe al médico si nota cambios en ann visión.  · No conduzca ni opere maquinaria pesada si la diplopia interfiere en ann visión.  · Concurra a todas las visitas de control amelia se lo haya indicado el médico. [place] es importante.  SOLICITE ATENCIÓN MÉDICA SI:  · La diplopia empeora.  · Presenta otros síntomas además de diplopia, amelia los siguientes:  ¨ Debilidad.  ¨ Entumecimiento.  ¨ Dolor de giovana.  ¨ Dolor en el calvin.  ¨ Torpeza.  ¨ Náuseas.  ¨ Caída del párpado.  ¨ Movimiento anómalo de savana de los ojos.  SOLICITE ATENCIÓN MÉDICA DE INMEDIATO SI:  · Pierde la visión de forma repentina.  · Siente un dolor de giovana muy intenso de manera repentina.  · Siente debilidad o adormecimiento repentinos.  · Pierde la capacidad de hablar o comprender el habla, o ambos, de forma repentina.  Esta información no tiene amelia fin reemplazar el consejo del médico. Asegúrese de hacerle al médico cualquier pregunta que tenga.  Document Released: 11/30/2009 Document Revised: 05/03/2016 Document Reviewed: 11/11/2015  Elsevier Interactive Patient Education © 2017 [employer]vier Inc.    Ictus isquémico  (Ischemic Stroke)  Un ictus isquémico (accidente cerebrovascular o ACV) es la muerte repentina de tejido  cerebral que ocurre cuando el oxígeno no llega a vero brian del cerebro. Es vero emergencia médica que debe tratarse de inmediato. Un ictus isquémico puede causar vero pérdida permanente de la actividad cerebral. [place] puede causar problemas con el funcionamiento de diferentes partes del cuerpo.  CAUSAS  Esta afección es causada por la falta de oxígeno en vero brian del cerebro, que puede ser el resultado de lo siguiente:  · Un pequeño coágulo sanguíneo (émbolos) o vero acumulación de placas en los vasos sanguíneos (ateroesclerosis) que bloqueen el torrente sanguíneo en el cerebro.  · Ritmo cardíaco anormal (fibrilación auricular).  · Vero arteria obstruida o dañada en la giovana o el nikko.  FACTORES DE RIESGO  Hay ciertos factores que pueden hacer que sea más propenso a sufrir esta afección. Algunos de estos factores los puede controlar, amelia por ejemplo:  · Obesidad.  · Fumar cigarrillos.  · Bharat anticonceptivos por vía oral, en especial si consume tabaco.  · Falta de actividad física.  · Consumo excesivo de bebidas alcohólicas.  · Consumo de drogas ilegales, especialmente cocaína y metanfetamina.  Otros factores de riesgo son los siguientes:  · Presión arterial elevada (hipertensión arterial).  · Colesterol elevado.  · Diabetes mellitus.  · Cardiopatía coronaria.  · Ser afroamericano, norteamericano nativo, hispano o nativo de [place].  · Ser mayor de 60 años.  · Tener antecedentes familiares de ictus.  · Tener antecedentes de coágulos sanguíneos, ictus o ataques isquémicos transitorios (AIT).  · Anemia drepanocítica.  · Ser adela con antecedentes de preeclampsia.  · Cefalea migrañosa.  · Apnea del sueño.  · Tener un ritmo cardíaco irregular, amelia fibrilación auricular.  · Tener enfermedades inflamatorias crónicas, amelia artritis reumatoide o lupus.  · Trastornos de coagulación (estado hipercoagulable).  SIGNOS Y SÍNTOMAS  Los síntomas de esta afección, por lo general, aparecen de forma repentina, o puede notarlos después  de despertarse. Entre los síntomas repentinos se pueden incluir los siguientes:  · Debilidad o adormecimiento de la johan, el brazo o la pierna, especialmente en un lado del cuerpo.  · Dificultad para caminar, o para  los brazos o las piernas.  · Pérdida del equilibrio o de la coordinación.  · Confusión.  · Habla arrastrada (disartria).  · Dificultad para hablar o comprender el lenguaje, o ambas (afasia).  · Cambios en la visión (amelia visión doble, visión borrosa o pérdida de la visión) en savana o ambos ojos.  · Mareos.  · Náuseas y vómitos.  · Dolor de giovana intenso sin causa aparente. Dolor de giovana que generalmente se describe amelia el peor dolor de giovana que haya sufrido.  En lo posible, tome nota de la hora exacta en la que se sintió normal por última vez y de la hora en que comenzaron los síntomas. Infórmele a ann médico.  Si los síntomas van y vienen, esto podría ser un signo de ictus de advertencia o AIT. [place] ayuda de inmediato, incluso si se siente mejor.  DIAGNÓSTICO  Esta afección se puede diagnosticar en función de lo siguiente:  · Los síntomas, jessenia antecedentes médicos y un examen físico.  · Tomografía computarizada (TC) del cerebro.  · Resonancia magnética (RM).  · Angiografía por tomografía computarizada (TC). En esta prueba, se utiliza vero computadora para tomarle radiografías de las arterias. Pueden inyectarle un colorante en la anna para observar el interior de los vasos sanguíneos con más claridad.  · Angiografía por resonancia magnética (RM). Se trata de un tipo de resonancia magnética que se usa para estudiar los vasos sanguíneos.  · Angiografía cerebral. En kamila estudio se utilizan tereza X y un colorante para observar los vasos sanguíneos del cerebro y el nikko.  Chuy vez deba consultar a un médico especialista en ictus. Puede consultar a un especialista en persona, por teléfono o mediante tecnología a distancia (telemedicina).  Se pueden realizar otros estudios para hallar la causa del  ictus, que pueden incluir los siguientes:  · Electrocardiograma (ECG).  · Monitorización electrocardiográfica continua.  · Ecocardiograma.  · Ecografía de la carótida.  · Estudio de la circulación cerebral.  · Análisis de anna.  · Estudio del sueño para verificar si tiene apnea del sueño.  TRATAMIENTO  El tratamiento de esta afección dependerá de la duración, la gravedad y la causa de los síntomas, y de la brian del cerebro afectada. Es muy importante recibir tratamiento tras aparecer los primeros síntomas del ictus. Algunos tratamientos resultan más eficaces si se realizan en el plazo de las 3 a 6 horas del comienzo de los síntomas del ictus. Estos tratamientos pueden incluir los siguientes:  · Aspirina.  · Medicamentos para controlar la presión arterial.  · Un medicamento inyectable para disolver el coágulo sanguíneo (trombolítico).  · Tratamientos aplicados directamente en la arteria afectada para eliminar o disolver el coágulo sanguíneo.  Otras opciones de tratamiento son las siguientes:  · Oxígeno.  · Líquidos por vía IV.  · Medicamentos para diluir la anna (anticoagulantes o inhibidores plaquetarios).  · Procedimientos para aumentar el flujo sanguíneo.  La administración de medicamentos y los cambios en la dieta pueden ayudar a tratar y controlar los factores de riesgo de ictus, amelia la diabetes, el colesterol alto y la hipertensión arterial.  Después de un ictus, puede trabajar con fisioterapeutas, fonoaudiólogos o terapeutas ocupacionales para ayudarlo a recuperarse.  INSTRUCCIONES PARA EL CUIDADO EN EL HOGAR  Medicamentos   · [place] los medicamentos de venta arlyn y los recetados solamente amelia se lo haya indicado el médico.  · Si le indicaron que tomara medicamentos para diluir la anna, amelia aspirinas o anticoagulantes, tómelos exactamente amelia se lo haya indicado el médico.  ¨ El exceso de anticoagulantes puede causar hemorragias.  ¨ Si no liseth la cantidad suficiente, no contará con la protección que  necesita contra otro ictus y demás problemas.  · Conozca los efectos secundarios de alex anticoagulantes. Al alex kamila tipo de medicamentos, asegúrese de lo siguiente:  ¨ Ejercer presión sobre las heridas por más tiempo que lo habitual.  ¨ Informe a ann dentista y otros médicos que liseth anticoagulantes antes de que le realicen alguna intervención quirúrgica que pueda causar hemorragias.  ¨ Evite realizar actividades que puedan causarle traumatismos o lesiones.  Comida y bebida   · Siga las indicaciones del médico acerca de la dieta.  · Consuma alimentos saludables.  · Si el ictus afectó ann capacidad para tragar, es posible que necesite alex medidas para no ahogarse, amelia las siguientes:  ¨ [place] de a porciones pequeñas.  ¨ [place] comidas blandas o en puré.  Seguridad   · Siga las indicaciones del equipo médico con respecto a la actividad física.  · Use un andador o un bastón amelia se lo haya indicado el médico.  · [place] medidas para crear un entorno seguro en ann casa a fin de reducir el riesgo de caídas. [place] puede incluir lo siguiente:  ¨ Hacer que profesionales inspeccionen ann casa.  ¨ Colocar barras para sostén en la habitación y el baño.  ¨ Colocar inodoros elevados y un asiento en la ducha amelia medidas de seguridad.  Instrucciones generales   · No consuma ningún producto que contenga tabaco, lo que incluye cigarrillos, tabaco de mascar y cigarrillos electrónicos. Si necesita ayuda para dejar de fumar, consulte al médico.  · Limite el consumo de alcohol a no más de 1 medida por día si es adela y no está embarazada, y 2 medidas por día si es hombre. [place] medida equivale a 12 onzas de cerveza, 5 onzas de vino o 1½ onzas de bebidas alcohólicas de martha graduación.  · Si necesita ayuda para dejar de consumir drogas o alcohol, pídale al médico que le recomiende un programa o que lo derive a un especialista.  · Mantenga un estilo de daniel activo y saludable. [place] actividad física habitualmente amelia se lo haya indicado el  médico.  · Acuda a todas las consultas de control amelia se lo haya indicado el médico, incluidas las consultas con todos los especialistas de ann equipo médico. [place] es importante.  PREVENCIÓN  El riesgo de sufrir otro ictus puede disminuir al tratar, de manera adecuada, la hipertensión arterial, el colesterol alto, la diabetes, las cardiopatías coronarias, la apnea del sueño y la obesidad. También puede disminuir si jordan de fumar, limita el consumo de alcohol y se mantiene físicamente activo.  El médico trabajará con usted para alex medidas a fin de evitar las complicaciones del ictus a corto y a deondre plazo.  SOLICITE ATENCIÓN MÉDICA DE INMEDIATO SI:  Tiene los siguientes síntomas:   · Tiene debilidad o adormecimiento súbito en el nawaf, el brazo o la pierna, especialmente en un lado del cuerpo.  · Confusión súbita.  · Dificultad repentina para hablar o comprender el lenguaje, o ambas (afasia).  · Dificultad repentina para sarita con savana o ambos ojos.  · Dificultad repentina para caminar o para  los brazos o las piernas.  · Mareos repentinos.  · Pérdida repentina del equilibrio o de la coordinación.  · Dolor de giovana súbito e intenso sin causa aparente.  · Pérdida parcial o total del conocimiento.  · Convulsiones.  Cualquiera de estos síntomas puede representar un problema grave y es vero emergencia. No espere hasta que los síntomas desaparezcan. Solicite atención médica de inmediato. Comuníquese con el servicio de emergencias de nan localidad (911 en los [place]). No conduzca por jessenia propios medios hasta el hospital.   Esta información no tiene amelia fin reemplazar el consejo del médico. Asegúrese de hacerle al médico cualquier pregunta que tenga.  Document Released: 09/27/2006 Document Revised: 01/08/2016 Document Reviewed: 03/15/2017  [employer] Interactive Patient Education © 2017 [employer].    Hypertension  Hypertension is another name for high blood pressure. High blood pressure forces your heart to  work harder to pump blood. A blood pressure reading has two numbers, which includes a higher number over a lower number (example: 110/72).  Follow these instructions at home:  · Have your blood pressure rechecked by your doctor.  · Only take medicine as told by your doctor. Follow the directions carefully. The medicine does not work as well if you skip doses. Skipping doses also puts you at risk for problems.  · Do not smoke.  · Monitor your blood pressure at home as told by your doctor.  Contact a doctor if:  · You think you are having a reaction to the medicine you are taking.  · You have repeat headaches or feel dizzy.  · You have puffiness (swelling) in your ankles.  · You have trouble with your vision.  Get help right away if:  · You get a very bad headache and are confused.  · You feel weak, numb, or faint.  · You get chest or belly (abdominal) pain.  · You throw up (vomit).  · You cannot breathe very well.  This information is not intended to replace advice given to you by your health care provider. Make sure you discuss any questions you have with your health care provider.  Document Released: 06/05/2009 Document Revised: 05/25/2017 Document Reviewed: 10/10/2014  [employer] Interactive Patient Education © 2017 [employer]  Prevención de caídas en el hogar  (Fall Prevention in the Home)  Las caídas pueden causar lesiones y afectar a personas de todas las edades. Hay muchas cosas simples que puede hacer para que ann casa sea un lugar seguro y ayudar a prevenir las caídas.  ¿QUÉ PUEDO HACER EN EL EXTERIOR DE MI CASA?  · Repare habitualmente los bordes de las aceras y las calzadas, así amelia las grietas.  · Retire los umbrales altos.  · Recorte los arbustos en el rajani principal de ingreso a ann hogar.  · Use vero iluminación brillante en el exterior.  · Elimine los residuos y las cosas amontonadas en los pasillos, lo que incluye herramientas y piedras.  · Verifique con frecuencia que los pasamanos estén danny  ajustados y en buen estado. Todas las escaleras deben tener pasamanos en ambos lados.  · Instale barandillas de protección en los bordes de las bhatia y galerías elevadas.  · Limpie regularmente las hojas, la pam y el hielo.  · Utilice arena o sal en los pasillos ernst los meses de invierno.  · En el garaje, limpie de inmediato cualquier derrame, incluidos los derrames de grasa y aceite.  ¿QUÉ PUEDO HACER EN EL BAÑO?  · Use luces nocturnas.  · Instale barras de apoyo en el inodoro, en la bañera y en la ducha. No use los toalleros amelia barras de apoyo.  · Utilice alfombras o calcomanías antideslizantes en el piso de la bañera o ducha.  · Si necesita sentarse mientras está debajo de la ducha, use un banco plástico antideslizante.  · Mantenga el piso seco. Seque de inmediato cualquier derrame de agua en el piso.  · Elimine regularmente la acumulación de jabón en la bañera o la ducha.  · Asegure las alfombras del baño con vero cinta antideslizante doble karyn para alfombras.  · Quite las alfombras y todo lo que sea un riesgo de tropiezo.  ¿QUÉ PUEDO HACER EN LA HABITACIÓN?  · Use luces nocturnas.  · Asegúrese de tener vero nima de fácil acceso al lado de la cama.  · No use sábanas o mantas muy grandes que se amontonen sobre el piso.  · Tenga vero silla firme con apoyabrazos para usar cuando se vista.  · Quite las alfombras y todo lo que sea un riesgo de tropiezo.  ¿QUÉ PUEDO HACER EN LA COCINA?  · Limpie de inmediato cualquier derrame.  · Evite caminar sobre pisos mojados.  · Coloque los objetos que usa con frecuencia en lugares de fácil acceso.  · Si necesita alcanzar algo que esté elevado, use vero beena firme con vero pérez de apoyo.  · Mantenga los cables eléctricos fuera del rajani.  · No use un pulidor o cera para pisos que dejen los pisos resbaladizos. Si debe usar cera, asegúrese de que sea cera antideslizante para pisos.  · Quite las alfombras y todo lo que sea un riesgo de tropiezo.  ¿QUÉ PUEDO HACER EN LAS  ESCALERAS?  · No deje ningún objeto en las escaleras.  · Asegúrese de que haya pasamanos en ambos lados de las escaleras. Repare los pasamanos que estén flojos o rotos. Asegúrese de que los pasamanos tengan la misma longitud que la beena.  · Verifique que las alfombras estén danny adheridas a las escaleras. Arregle las alfombras flojas o gastadas.  · Evite colocar alfombras en la parte superior o inferior de las escaleras, o asegure las alfombras con cinta adhesiva para alfombras a fin de evitar que se muevan.  · Asegúrese de tener un interruptor de nima en la parte superior e inferior de las escaleras. Si no lo tiene, instale savana.  ¿HAY OTROS CONSEJOS PARA PREVENIR CAÍDAS?  · Use calzado cerrado en los dedos que le quede danny y le amortigüe los pies. Use calzado con suela de goma o taco bajo.  · Cuando use vero beena de mano, asegúrese de que esté abierta por completo y de que los lados estén danny asegurados. Pídale a alguien que le sostenga la beena mientras la esté usando. No suba a vero beena de mano cerrada.  · Añada pintura de contraste o cinta de colores a las barras de apoyo y los pasamanos en ann casa. Coloque tiras de contraste de color en el primer y el último escalón.  · Utilice dispositivos de ayuda para la movilidad, amelia bastones, andadores, patinetes con soporte para el pie y muletas.  · Prenda las luces si está oscuro. Reemplace las bombillas que se hayan quemado.  · Disponga los muebles de modo de que el rajani esté despejado. Deje los muebles siempre en el mismo lugar.  · Arregle las superficies desparejas del piso.  · Para la beena, elija un diseño de alfombra que no oculte el borde de los escalones.  · Esté atento a las mascotas.  · Revise los medicamentos con el médico. Algunos medicamentos pueden causar mareos o cambios en la presión arterial, lo que aumenta el riesgo de caídas.  Hable con el médico sobre otras maneras de reducir el riesgo de caídas. [place] puede incluir trabajar con  un fisioterapeuta o un entrenador para mejorar la fuerza, el equilibrio y la resistencia.  Esta información no tiene amelia fin reemplazar el consejo del médico. Asegúrese de hacerle al médico cualquier pregunta que tenga.  Document Released: 03/26/2009 Document Revised: 05/03/2016 Document Reviewed: 01/22/2016  [employer] Interactive Patient Education © 2017 [employer]    Depresión en el adulto   (Depression, Adult)  La depresión es un sentimiento de tristeza, decaimiento, sufrimiento espiritual o vacío. Hay dos tipos de depresión:   1. La depresión que todos experimentamos de tanto en tanto debido a experiencias de la daniel inquietantes, amelia la pérdida del trabajo o el final de vero relación (tristeza normal o duelo normal). Jacki tipo de depresión se considera normal, es de corta duración y se resuelve entre unos pocos días y 2 semanas. (La depresión que se experimenta tras la pérdida de un ser querido se llama duelo. El duelo en general dura más de 2 semanas, lucia normalmente mejora con el tiempo).  2. La depresión clínica, es la que dura más que la tristeza o duelo normal o la que interfiere con ann capacidad de desenvolverse en el hogar, en el trabajo y en la escuela. También interfiere en las relaciones personales. Afecta vivian todos los aspectos de la daniel. La depresión clínica es vero enfermedad.  Los síntomas de depresión pueden tener ann origen en otras afecciones que no sabrina la tristeza y el duelo o la depresión clínica. Ejemplos de estas afecciones son:   · Enfermedades físicas: Algunas enfermedades físicas, incluyendo poca actividad de la glándula tiroides (hipotiroidismo), anemia grave, ciertos tipos de cáncer, diabetes, convulsiones incontrolables, problemas cardíacos y pulmonares, ictus y el dolor crónico se asocian con síntomas de depresión.  · Efectos secundarios de algún medicamento recetado: En algunas personas, ciertos tipos de medicamentos recetados pueden causar síntomas de depresión.  · Abuso de  sustancias: El abuso de alcohol y drogas puede causar síntomas de depresión.  SÍNTOMAS  Los síntomas de tristeza y duelo normal son:   · Sentirse cassandra o llorar ernst períodos cortos de tiempo.  · Falta de preocupación por todo (apatía).  · Dificultad para dormir o dormir demasiado.  · No poder disfrutar de las cosas que antes disfrutaba.  · El deseo de estar solo todo el tiempo (aislamiento social).  · Falta de energía o motivación.  · Dificultad para concentrarse o recordar.  · Cambios en el apetito o en el peso.  · Inquietud o agitación.  Los síntomas de la depresión clínica son los mismos de la tristeza o duelo normal y también incluyen los siguientes síntomas:   · Sentirse cassandra o llorar todo el tiempo.  · Sentimientos de culpa o inutilidad.  · Sentimientos de desesperanza o desamparo.  · Pensamientos de suicidio o el deseo de dañarse a sí mismo (ideas suicidas).  · Pérdida de contacto con la realidad (síntomas psicóticos). [place] o escuchar cosas que no son reales (alucinaciones)o tener creencias falsas acerca de ann daniel o de las personas que lo rodean (delirios y paranoia).  DIAGNÓSTICO   El diagnóstico de la depresión clínica se basa en la gravedad y duración de los síntomas. El médico le hará preguntas sobre ann historia clínica y el uso de sustancias para determinar si vero enfermedad física, el uso de medicamentos recetados, o el abuso de sustancias es la causa de ann depresión. Ann médico también puede indicar análisis de anna.   TRATAMIENTO   Por lo general, la tristeza y el duelo normal no requieren tratamiento. Maura a veces se recetan antidepresivos ernst el duelo para aliviar los síntomas de depresión hasta que se resuelven.   El tratamiento para la depresión clínica depende de la gravedad de los síntomas, maura suele incluir antidepresivos, psicoterapia con un profesional de la barbi mental o vero combinación de ambos. El médico le ayudará a determinar qué tratamiento es el mejor para usted.   La  depresión causada por vero enfermedad física generalmente desaparece con tratamiento médico adecuado de la enfermedad. Si un medicamento recetado le causa depresión, hable con ann médico acerca de suspenderlo, disminuir la dosis o sustituirlo por otro medicamento.   La depresión causada por el abuso de alcohol o abuso de drogas ilícitas se va con la abstinencia de estas sustancias. Algunos adultos necesitan ayuda profesional con el fin de dejar de beber o usar drogas.   SOLICITE ATENCIÓN MÉDICA DE INMEDIATO SI:   · Tiene pensamientos acerca de lastimarse o dañar a otras personas.  · Pierde el contacto con la realidad (tiene síntomas psicóticos).  · Está tomando medicamentos para la depresión y tiene efectos colaterales graves.  PARA OBTENER MÁS INFORMACIÓN  National [place] on Mental Illness: www.anna.org    National [place] of Mental Health: www.nimh.nih.gov    Document Released: 12/18/2006 Document Revised: 06/18/2013  [employer]® Patient Information ©2014 [employer].        Occupational Therapy Discharge Instructions for Noemi Lopez    8/27/2019    Level of Assist Required for Eating: Able to Complete Eating without Assist  Level of Assist Required for Grooming: Able to Complete Grooming without Assist  Level of Assist Required for Dressing: Able to Complete Dressing without Assist  Level of Assist Required for Toileting: Able to Complete Toileting without Assist  Level of Assist Required for Toilet Transfer: Requires Supervision with Toilet Transfer  Equipment for Toilet Transfer: Grab Bars by Toilet  Level of Assist Required for Bathing: Able to Complete Bathing without Assist  Equipment for Bathing: Shower Chair, Grab Bars in Tub / Shower, Hand Held Shower Head  Level of Assist Required for Shower Transfer: Requires Supervision with Shower Transfer  Equipment for Shower Transfer: Grab Bars in Tub / Shower, Shower Chair  Level of Assist Required for Home Mgmt: Requires Supervision with Home  Management  Level of Assist Required for Meal Prep: Requires Supervision with Meal Preparation  Driving: Please Contact Physician Prior to Driving  Home Exercise Program: None Issued    Physical Therapy Discharge Instructions for Noemi Lopez    8/27/2019    Level of Assist Required for Ambulation: Supervision on Flat Surfaces, Supervision on Curbs, Assist for Balance on Stairs  Device Recommended for Ambulation: Front-Wheeled Walker  Level of Assist Required for Transfers: Supervision  Device Recommended for Transfers: None, Front-Wheeled Walker  Home Exercise Program: Refer to Home Exercise Program Handout for Details    Nivel de asistencia requerido para la deambulación: supervisión en superficies maureen, supervisión en bordillos   Dispositivo recomendado para deambulación: andador con lavon delanteras   Dispositivo recomendado para transferencias: ninguno   Programa de ejercicio en el hogar: consulte el folleto del programa de ejercicio para obtener más información     Amber por todo ann trabajo! Fue un gusto trabajar con [employer] suerte con ann recuperación continua.  -Cathleen Lewis, PT, DPT y Mariza Rojas SPT

## 2019-08-28 NOTE — PROGRESS NOTES
Received bedside shift report from Edelmira GUZMAN RN regarding patient and assumed care. Patient awake, calm and stable, currently positioned in bed for comfort and safety; call light within reach. Denies pain or discomfort at this time. Will continue to monitor.

## 2019-08-28 NOTE — PROGRESS NOTES
This patient, and spouse with patient permission, received individualized medication counseling regarding the current regimen they are receiving in this facility. Potential adverse effects, monitoring parameters, and proper administration were covered in preparation for their discharge. This patient is being treated for hypertension and it is recommended that they monitor and record with a blood pressure device. The patient has been instructed to contact their primary care physician if the HR or blood pressure is abnormal. Blood sugar monitoring was discussed as well as the signs and symptoms of hypoglycemia as the patient is currently on insulin.The patient asked relevant questions regarding the medications for which answers were provided. Our pharmacy hours and phone number were provided for follow up questions should they arise.    Carlos Fletcher  [facility]

## 2019-08-29 NOTE — DISCHARGE SUMMARY
Rehab Discharge Note    Admission: 8/21/2019    Discharge: 8/28/2019    Admission Diagnosis:   Active Hospital Problems    Diagnosis   • *Stroke, lacunar (HCC)   • Vitamin D deficiency   • Type 2 diabetes mellitus with hyperglycemia, without long-term current use of insulin (HCC)   • Essential hypertension       Discharge Diagnosis:  Active Hospital Problems    Diagnosis   • *Stroke, lacunar (HCC)   • Vitamin D deficiency   • Type 2 diabetes mellitus with hyperglycemia, without long-term current use of insulin (HCC)   • Essential hypertension       HPI prior to rehab   Patient is a 69 y.o. Wol[place] speaking female  with a past medical history of hypertension, diabetes, admitted to [facility] on 8/18, with dizziness, nausea/emesis.  Head CT negative. MRI with right paramedian dorsal midbrain and right thalamic strokes. HgbA1c 9.8, LDL 100, ECHO EF 70 %.      Using the , patient currently reports dizziness and nausea when she is her head and moves.  She also reports double vision.  She denies any focal weakness but reports generalized weakness.  She is right-hand dominant.  She does wear glasses.  Denies any pain or paresthesias.  Multiple family members are present.  They are asking if someone can stay with her at night for the patient's comfort.  She reports she does not have a glucometer at home.     Patient was evaluated by Rehab Medicine physician and Physical Therapy and Occupational Therapy and determined to be appropriate for acute inpatient rehab and was transferred to [facility] on 8/21/2019.     Rehab Hospital Course    Right paramedial midbrain/thalmic stroke  Non-dominant  Double vision, improved  Nystagmus, improved  Continue aspirin and statin for secondary stroke prophylaxis  Eye patch PRN  Referral made to Dr. Ireland     Dizziness, improved  Continue scopolamine patch, explained to patient/family they can discontinued this at home as her symptoms  improve/resolve  PRN Zofran     Hypertension  Continue Enalapril  Appreciate hospitalist assistance     Diabetes  Continue Metformin   Continue SSI  Appreciate hospitalist assistance     Vit D insufficiency  Continue supplementation     DVT prophylaxis  Discontinued Lovenox as patient is ambulating long distances       Functional Status at Discharge  Eatin - Modified Independent  Eating Description:  Increased time, Supervision for safety(SBA with family present for meal.  No observable difficulty with loading/unloading utensils)  Groomin - Modified Independent  Grooming Description:  Increased time(Patient combed hair with Mod I while seated in w/c)  Bathin - Modified Independent  Bathing Description:  Grab bar, Tub bench, Hand rails, Hand held shower, Increased time(Patient washed, rinsed and dried all body parts with mod I while incorporating sitting and standing with grab bar for steadying assist. )  Upper Body Dressin - Modified Independent  Upper Body Dressing Description:  Increased time(Patient demonstrated ability to retrieve clothing from closet and donned bra and long-sleeved shirt with mod I while seated in w/c )  Lower Body Dressin - Modified Independent  Lower Body Dressing Description:  6 - Modified Independent  Discharge Location : Home  Patient Discharging with Assist of: Family   Level of Supervision Required: Intermittent Supervision  Recommended Equipment for Discharge: Front-Wheeled Walker  Recommended Services Upon Discharge: No Follow-Up Occupational Therapy Recommended  Long Term Goals Met: 1  Long Term Goals Not Met: 1  Reason(s) for Goals Not Met: SBA recommended for functional transfers at this time  Criteria for Termination of Services: Maximum Function Achieved for Inpatient Rehabilitation  Walk:  5 - Standby Prompting/Supervision or Set-up  Distance Walked:  Walks a minimum of 150 feet  Walk Description:  Extra time, Safety concerns, Supervision for safety,  "Walker(Multiple bouts of ambulation indoors and outdoors over varied surfaces from 100-300' with seated rest breaks, with FWW, SBA and VCs for obstacle navigation)  Wheelchair:  0 - Not tested, patient refused  Distance Propelled:  Propels a minimum of 150 feet   Wheelchair Description:  Extra time, Supervision for safety(Wc propulsion 150' SBA with increased time and VC)  Stairs 5 - Standby Prompting/Supervision or Set-up  Stairs DescriptionHand rails, Extra time, Supervision for safety(12 6\" steps with 2 HRs, SBA)  Discharge Location: Home  Patient Discharging with Assist of: Family  Level of Supervision Required Upon Discharge: Twenty Four Hour Supervision  Recommended Equipment for Discharge: Front-Wheeled Walker  Recommeded Services Upon Discharge: Outpatient Physical Therapy  Criteria for Termination of Services: Maximum Function Achieved for Inpatient Rehabilitation  Comprehension Mode:  Auditory  Comprehension:  6 - Modified Independent  Comprehension Description:     Expression Mode:  Vocal  Expression:  6 - Modified Independent  Expression Description:     Social Interaction:  6 - Modified Independent  Social Interaction Description:     Problem Solvin - Minimal Assistance  Problem Solving Description:     Memory:  4 - Minimal Assistance  Memory Description:          Discharge Medication:     Medication List      START taking these medications      Instructions   magnesium oxide 400 (241.3 Mg) MG Tabs tablet  Commonly known as:  MAG-OX  Notes to patient:  Magnesium supplement   Take 1 Tab by mouth every day.  Dose:  400 mg     scopolamine 1 mg/72hr Pt72  Start taking on:  2019  Commonly known as:  TRANSDERM-SCOP  Notes to patient:  dizziness   Apply 1 Patch to skin as directed every 72 hours.  Dose:  1 Patch     vitamin D 1000 UNIT Tabs  Start taking on:  2019  Commonly known as:  cholecalciferol  Notes to patient:  Vitamin D   Take 2 Tabs by mouth every day.  Dose:  2,000 Units      "   CHANGE how you take these medications      Instructions   enalapril 5 MG Tabs  Start taking on:  8/29/2019  What changed:    · medication strength  · how much to take  Commonly known as:  VASOTEC  Notes to patient:  Blood pressure   Take 1 Tab by mouth every day.  Dose:  5 mg        CONTINUE taking these medications      Instructions   aspirin 81 MG EC tablet  Notes to patient:  Blood thinner/ heart health   Take 1 Tab by mouth every day.  Dose:  81 mg     atorvastatin 80 MG tablet  Commonly known as:  LIPITOR  Notes to patient:  cholesterol   Take 1 Tab by mouth every evening.  Dose:  80 mg     metFORMIN 850 MG Tabs  Commonly known as:  GLUCOPHAGE  Notes to patient:  Diabetes control   Take 1 Tab by mouth 2 times a day, with meals.  Dose:  850 mg        STOP taking these medications    acetaminophen 325 MG Tabs  Commonly known as:  TYLENOL     glucosamine Sulfate 500 MG Caps     LORazepam 0.5 MG Tabs  Commonly known as:  ATIVAN     meclizine 25 MG Tabs  Commonly known as:  ANTIVERT     Non Formulary Request     ondansetron 4 MG Tbdp  Commonly known as:  ZOFRAN ODT     SITagliptin 25 MG Tabs  Commonly known as:  JANUVIA          Follow-up Information:     Ramez Traylor, APRN  [address]  [address]  [place] [address]  [phone]  Primary Care - thursday, August 29, 2019 @ 3:30PM check in, 4PM appointment. Need to take 2 months proof of income, ID and  proof of address.      Moshe Ireland M.D.  [address]  [place] [id number] [phone]  Neuro Ophthalmologist   Office to call you to schedule an appointment.     Condition on Discharge:  Good.    More than 32 minutes was spent on discharging this patient, including face-to-face time, prescription management, and the dictation of this note.

## 2019-08-29 NOTE — DISCHARGE PLANNING
Case management Summary:   Met with patient, her sons, using [employer] to assist with translation, prior to discharge.   Patient financial assistance met with sons and patient this AM to assist with financial assistance application.  Reviewed follow up appointment with primary care on 8/29/2019.  Patient needed a FWW and glucometer, but the family had not completed the [facility] application.[employer] Sarah, assisted son to complete the application. I faxed completed application with face sheet to [facility].   Son went to  [employer].  Patient has no payer source OP therapy and is too high functioning for home health care.   During hospitalization, I have provided support and education and have been available for questions and information during hours of operation, communicated with therapy team and MD along with providing links/resources  to outside services.    Patient verbalizes agreement with all plans and has an understanding of the next steps within the post acute services.     Individualized Goals:   1. Improve functional status to return home with family support  2. Establish with PCP  3. Patient and family knowledgeable of post acute care plan and aware how to proceed      Outcome:   1. Met  2. Met - appointment 8/29/2019  3. Language was a barrier, but nursing, pharmacy and I spent considerable time with patient and two sons to respond to all questions. My name and number was provided on the paperwork in case of any questions.

## 2019-09-17 ENCOUNTER — OFFICE VISIT (OUTPATIENT)
Dept: OPHTHALMOLOGY | Facility: MEDICAL CENTER | Age: 69
End: 2019-09-17

## 2019-09-17 DIAGNOSIS — H52.03 HYPEROPIA OF BOTH EYES WITH ASTIGMATISM: ICD-10-CM

## 2019-09-17 DIAGNOSIS — H53.2 DOUBLE VISION: ICD-10-CM

## 2019-09-17 DIAGNOSIS — E11.65 TYPE 2 DIABETES MELLITUS WITH HYPERGLYCEMIA, WITHOUT LONG-TERM CURRENT USE OF INSULIN (HCC): ICD-10-CM

## 2019-09-17 DIAGNOSIS — H52.203 HYPEROPIA OF BOTH EYES WITH ASTIGMATISM: ICD-10-CM

## 2019-09-17 DIAGNOSIS — H25.13 AGE-RELATED NUCLEAR CATARACT OF BOTH EYES: ICD-10-CM

## 2019-09-17 PROCEDURE — 99203 OFFICE O/P NEW LOW 30 MIN: CPT | Performed by: OPHTHALMOLOGY

## 2019-09-17 PROCEDURE — 92015 DETERMINE REFRACTIVE STATE: CPT | Performed by: OPHTHALMOLOGY

## 2019-09-17 RX ORDER — MECLIZINE HYDROCHLORIDE 25 MG/1
25 TABLET ORAL 3 TIMES DAILY PRN
COMMUNITY
End: 2019-10-09

## 2019-09-17 ASSESSMENT — REFRACTION
OS_CYLINDER: +1.25
OD_SPHERE: +2.00
OS_AXIS: 168
OD_CYLINDER: +2.00
OD_AXIS: 180
OS_SPHERE: +2.00

## 2019-09-17 ASSESSMENT — REFRACTION_MANIFEST
OD_AXIS: 176
OD_CYLINDER: +1.75
OD_SPHERE: +2.00
OS_AXIS: 169
METHOD_AUTOREFRACTION: 1
OS_CYLINDER: +1.25
OS_SPHERE: +2.00

## 2019-09-17 ASSESSMENT — EXTERNAL EXAM - LEFT EYE: OS_EXAM: NORMAL

## 2019-09-17 ASSESSMENT — VISUAL ACUITY
METHOD: SNELLEN - LINEAR
OD_SC: 20/70+1
OS_SC: 20/60-1
OD_PH_SC: 20/50+1
OS_PH_SC: 20/40-1

## 2019-09-17 ASSESSMENT — CUP TO DISC RATIO
OS_RATIO: 0.2
OD_RATIO: 0.2

## 2019-09-17 ASSESSMENT — ENCOUNTER SYMPTOMS
DIZZINESS: 1
BLURRED VISION: 1
EYE DISCHARGE: 1

## 2019-09-17 ASSESSMENT — CONF VISUAL FIELD
OD_NORMAL: 1
OS_NORMAL: 1

## 2019-09-17 ASSESSMENT — SLIT LAMP EXAM - LIDS
COMMENTS: NORMAL
COMMENTS: NORMAL

## 2019-09-17 ASSESSMENT — TONOMETRY
OD_IOP_MMHG: 13
OS_IOP_MMHG: 13
IOP_METHOD: APPLANATION

## 2019-09-17 ASSESSMENT — EXTERNAL EXAM - RIGHT EYE: OD_EXAM: NORMAL

## 2019-09-17 NOTE — PROGRESS NOTES
Peds/Neuro Ophthalmology:   Moshe Ireland M.D.    Date & Time note created:    9/17/2019   3:08 PM     Referring MD / APRN:  No primary care provider on file., No att. providers found    Patient ID:  Name:             Noemi Lopez   YOB: 1950  Age:                 69 y.o.  female   MRN:               [id number]    Chief Complaint/Reason for Visit:     Possible Stroke      History of Present Illness:    Noemi Lopez is a 69 y.o. female   Stroke approx 1 mo ago,released from rehab hospital.Pt had some double vision right after stroke but it has resolved.Blurred vision mainly at distance but worse post stroke.Some stinging and tearing in the wind but better with eyeglass protection.      Review of Systems:  Review of Systems   Eyes: Positive for blurred vision and discharge.   Neurological: Positive for dizziness.   All other systems reviewed and are negative.      Past Medical History:   Past Medical History:   Diagnosis Date   • Depression    • Diabetes (HCC)    • Hypertension        Past Surgical History:  History reviewed. No pertinent surgical history.    Current Outpatient Medications:  Current Outpatient Medications   Medication Sig Dispense Refill   • meclizine (ANTIVERT) 25 MG Tab Take 25 mg by mouth 3 times a day as needed.     • SITagliptin (JANUVIA) 25 MG Tab Take 25 mg by mouth every day.     • aspirin 81 MG EC tablet Take 1 Tab by mouth every day. 30 Tab 0   • atorvastatin (LIPITOR) 80 MG tablet Take 1 Tab by mouth every evening. 30 Tab 0   • enalapril (VASOTEC) 5 MG Tab Take 1 Tab by mouth every day. 30 Tab 0   • metFORMIN (GLUCOPHAGE) 850 MG Tab Take 1 Tab by mouth 2 times a day, with meals. 60 Tab 0   • magnesium oxide (MAG-OX) 400 (241.3 Mg) MG Tab tablet Take 1 Tab by mouth every day. 30 Tab 0   • scopolamine (TRANSDERM-SCOP) 1 mg/72hr PATCH 72 HR Apply 1 Patch to skin as directed every 72 hours. 4 Patch 3   • vitamin D (CHOLECALCIFEROL) 1000 UNIT Tab  Take 2 Tabs by mouth every day. 30 Tab      No current facility-administered medications for this visit.        Allergies:  Allergies   Allergen Reactions   • Pcn [Penicillins]    • Thiamine        Family History:  Family History   Problem Relation Age of Onset   • Diabetes Child    • Diabetes Other        Social History:  Social History     Socioeconomic History   • Marital status:      Spouse name: Not on file   • Number of children: Not on file   • Years of education: Not on file   • Highest education level: Not on file   Occupational History   • Not on file   Social Needs   • Financial resource strain: Not on file   • Food insecurity:     Worry: Not on file     Inability: Not on file   • Transportation needs:     Medical: Not on file     Non-medical: Not on file   Tobacco Use   • Smoking status: Never Smoker   • Smokeless tobacco: Never Used   Substance and Sexual Activity   • Alcohol use: Never     Frequency: Never   • Drug use: Never   • Sexual activity: Not on file   Lifestyle   • Physical activity:     Days per week: Not on file     Minutes per session: Not on file   • Stress: Not on file   Relationships   • Social connections:     Talks on phone: Not on file     Gets together: Not on file     Attends [employer] service: Not on file     Active member of club or organization: Not on file     Attends meetings of clubs or organizations: Not on file     Relationship status: Not on file   • Intimate partner violence:     Fear of current or ex partner: Not on file     Emotionally abused: Not on file     Physically abused: Not on file     Forced sexual activity: Not on file   Other Topics Concern   • Not on file   Social History Narrative    68 yo retired female          Physical Exam:  Physical Exam    Oriented x 3  Weight/BMI: There is no height or weight on file to calculate BMI.  There were no vitals taken for this visit.    Base Eye Exam     Visual Acuity (Snellen - Linear)       Right Left    Dist sc  20/70+1 20/60-1    Dist ph sc 20/50+1 20/40-1          Tonometry (Applanation, 2:29 PM)       Right Left    Pressure 13 13          Pupils       Pupils    Right PERRL    Left PERRL          Visual Fields       Right Left     Full Full          Extraocular Movement       Right Left     Full, Ortho Full, Ortho          Neuro/Psych     Oriented x3:  Yes    Mood/Affect:  Normal            Additional Tests     Color       Right Left    Ishihara 10/10 10/10          Stereo     Fly:  -    Animals:  0/3    Circles:  0/9            Slit Lamp and Fundus Exam     External Exam       Right Left    External Normal Normal          Slit Lamp Exam       Right Left    Lids/Lashes Normal Normal    Conjunctiva/Sclera White and quiet Pinguecula    Cornea Clear Clear    Anterior Chamber Deep and quiet Deep and quiet    Iris Round and reactive Round and reactive    Lens Nuclear sclerosis Nuclear sclerosis    Vitreous Normal Normal          Fundus Exam       Right Left    Disc Normal Normal    C/D Ratio 0.2 0.2    Macula Normal Normal    Vessels Normal Normal    Periphery One cotton wool spot off of the inferior arcade Normal            Refraction     Manifest Refraction (Auto)       Sphere Cylinder Axis Dist VA    Right +2.00 +1.75 176 20/30    Left +2.00 +1.25 169 20/40          Cycloplegic Refraction       Sphere Cylinder Axis    Right +2.00 +2.00 180    Left +2.00 +1.25 168          Final Rx       Sphere Cylinder Axis Add    Right +2.00 +2.00 180 +3.00    Left +2.00 +1.25 168 +3.00                Pertinent Lab/Test/Imaging Review:      Assessment and Plan:     Double vision  9/17/2019 - Following lacunar stroke. Has resolved. Motility full    Age-related nuclear cataract of both eyes  9/17/2019 - mild NS cataracts. Can manifest to 20/30    Hyperopia of both eyes with astigmatism  9/17/2019 - hyperopia and astigmatism. Will give glasses rx    Type 2 diabetes mellitus with hyperglycemia, without long-term current use of insulin  (HCC)  9/17/2019 - Small cotton wool spot off of the inferior arcade? Early BDR or related to HTN and strolke - Will re-eval in 6 months        Moshe Ireland M.D.

## 2019-09-17 NOTE — ASSESSMENT & PLAN NOTE
9/17/2019 - Small cotton wool spot off of the inferior arcade? Early BDR or related to HTN and strolke - Will re-eval in 6 months

## 2019-10-09 ENCOUNTER — OFFICE VISIT (OUTPATIENT)
Dept: CARDIOLOGY | Facility: MEDICAL CENTER | Age: 69
End: 2019-10-09

## 2019-10-09 VITALS
SYSTOLIC BLOOD PRESSURE: 114 MMHG | BODY MASS INDEX: 29.25 KG/M2 | HEIGHT: 60 IN | WEIGHT: 149 LBS | HEART RATE: 74 BPM | DIASTOLIC BLOOD PRESSURE: 74 MMHG

## 2019-10-09 DIAGNOSIS — I63.81 STROKE, LACUNAR (HCC): ICD-10-CM

## 2019-10-09 PROCEDURE — 99204 OFFICE O/P NEW MOD 45 MIN: CPT | Performed by: INTERNAL MEDICINE

## 2019-10-09 ASSESSMENT — ENCOUNTER SYMPTOMS
NERVOUS/ANXIOUS: 0
EYE DISCHARGE: 0
FEVER: 0
SHORTNESS OF BREATH: 0
PALPITATIONS: 0
BRUISES/BLEEDS EASILY: 0
DIZZINESS: 1
CHILLS: 0
COUGH: 0
BLURRED VISION: 1
NAUSEA: 0
MYALGIAS: 0
PND: 0
HEADACHES: 0
DEPRESSION: 0
HEARTBURN: 0

## 2019-10-09 NOTE — LETTER
[facility]   [address] [facility] [address]  [place] [address]  Phone: [phone]  Fax: [phone]              Noemi Lopez  1950    Encounter Date: 10/9/2019    Jasmeet Gerber M.D.          PROGRESS NOTE:  Chief Complaint   Patient presents with   • Abnormal EKG       Subjective:   Noemi Lopez is a 69 y.o. female who presents today after right thalamic lacunar infarct was diagnosed in mid August 2019 with chief complaint of dizziness and abnormal CT.  During that hospitalization, sinus rhythm was documented with no evidence of arrhythmias.  EKG demonstrated diffuse ST elevation.  Troponin T levels were 21 and 22, considered unremarkable and not indicative of myocardial necrosis.  Echocardiogram was entirely normal although bubble study was not done.    The follow-up visit here apparently was arranged in August 2019 apparently to make sure she had no cardiac etiology such as PAF to explain the lacunar infarcts.    Since hospitalization she ambulates and uses a family member for support due to some sense of imbalance.  There was a time when she had severe vertigo but this is improved.  She also had diplopia which is improved.  She has no prior history of heart disease.  She denies palpitations.  She denies discomfort or shortness of breath at rest or with activity.  She is compliant with current meds.    Past Medical History:   Diagnosis Date   • Depression    • Diabetes (HCC)    • Hypertension      No past surgical history on file.  Family History   Problem Relation Age of Onset   • Diabetes Child    • Diabetes Other      Social History     Socioeconomic History   • Marital status:      Spouse name: Not on file   • Number of children: Not on file   • Years of education: Not on file   • Highest education level: Not on file   Occupational History   • Not on file   Social Needs   • Financial resource strain: Not on file   • Food  insecurity:     Worry: Not on file     Inability: Not on file   • Transportation needs:     Medical: Not on file     Non-medical: Not on file   Tobacco Use   • Smoking status: Never Smoker   • Smokeless tobacco: Never Used   Substance and Sexual Activity   • Alcohol use: Never     Frequency: Never   • Drug use: Never   • Sexual activity: Not on file   Lifestyle   • Physical activity:     Days per week: Not on file     Minutes per session: Not on file   • Stress: Not on file   Relationships   • Social connections:     Talks on phone: Not on file     Gets together: Not on file     Attends [employer] service: Not on file     Active member of club or organization: Not on file     Attends meetings of clubs or organizations: Not on file     Relationship status: Not on file   • Intimate partner violence:     Fear of current or ex partner: Not on file     Emotionally abused: Not on file     Physically abused: Not on file     Forced sexual activity: Not on file   Other Topics Concern   • Not on file   Social History Narrative    68 yo retired female     Allergies   Allergen Reactions   • Pcn [Penicillins]    • Thiamine      Outpatient Encounter Medications as of 10/9/2019   Medication Sig Dispense Refill   • SITagliptin (JANUVIA) 25 MG Tab Take 25 mg by mouth every day.     • aspirin 81 MG EC tablet Take 1 Tab by mouth every day. 30 Tab 0   • atorvastatin (LIPITOR) 80 MG tablet Take 1 Tab by mouth every evening. 30 Tab 0   • enalapril (VASOTEC) 5 MG Tab Take 1 Tab by mouth every day. 30 Tab 0   • metFORMIN (GLUCOPHAGE) 850 MG Tab Take 1 Tab by mouth 2 times a day, with meals. 60 Tab 0   • magnesium oxide (MAG-OX) 400 (241.3 Mg) MG Tab tablet Take 1 Tab by mouth every day. 30 Tab 0   • vitamin D (CHOLECALCIFEROL) 1000 UNIT Tab Take 2 Tabs by mouth every day. 30 Tab    • [DISCONTINUED] meclizine (ANTIVERT) 25 MG Tab Take 25 mg by mouth 3 times a day as needed.     • [DISCONTINUED] scopolamine (TRANSDERM-SCOP) 1 mg/72hr PATCH 72  HR Apply 1 Patch to skin as directed every 72 hours. (Patient not taking: Reported on 10/9/2019) 4 Patch 3     No facility-administered encounter medications on file as of 10/9/2019.      Review of Systems   Constitutional: Negative for chills, fever and malaise/fatigue.   Eyes: Positive for blurred vision. Negative for discharge.   Respiratory: Negative for cough and shortness of breath.    Cardiovascular: Negative for chest pain, palpitations, leg swelling and PND.   Gastrointestinal: Negative for heartburn and nausea.   Genitourinary: Negative for dysuria and urgency.   Musculoskeletal: Negative for myalgias.   Skin: Negative for itching and rash.   Neurological: Positive for dizziness. Negative for headaches.   Endo/Heme/Allergies: Negative for environmental allergies. Does not bruise/bleed easily.   Psychiatric/Behavioral: Negative for depression. The patient is not nervous/anxious.         Objective:   /74 (BP Location: Left arm, Patient Position: Sitting, BP Cuff Size: Adult)   Pulse 74   Ht 1.524 m (5')   Wt 67.6 kg (149 lb)   BMI 29.10 kg/m²      Physical Exam   Constitutional: She is oriented to person, place, and time.   Very pleasant lady appearing older than stated age.  History was taken with the assistance of an online .  Patient's son was also present.   Neck: No JVD present.   Cardiovascular: Normal rate and regular rhythm. Exam reveals no gallop and no friction rub.   No murmur heard.  Pulmonary/Chest: Effort normal and breath sounds normal.   Abdominal: Soft. There is no tenderness.   Neurological: She is alert and oriented to person, place, and time.   Skin: Skin is warm and dry.       Assessment:     1. Stroke, lacunar (HCC)         Medical Decision Making:  Today's Assessment / Status / Plan:   I reviewed the August 2019 hospital charts thoroughly.  No evidence of abnormal heart rhythm during that hospitalization.  Location of these infarcts are more likely due to  thrombosis rather than thromboembolism.  No indication to monitor the heart for any length of time.  Appropriate therapy with aspirin antihypertensives and statins is being done and appears to be optimal..  No need for follow-up here.  Thank you for this consult..      Brittney Redd M.D.  [address] [address]  VIA Facsimile: [phone]

## 2020-03-23 ENCOUNTER — APPOINTMENT (OUTPATIENT)
Dept: OPHTHALMOLOGY | Facility: MEDICAL CENTER | Age: 70
End: 2020-03-23
Payer: MEDICAID

## 2021-01-15 DIAGNOSIS — Z23 NEED FOR VACCINATION: ICD-10-CM

## 2024-09-10 ENCOUNTER — OFFICE VISIT (OUTPATIENT)
Dept: URGENT CARE | Facility: CLINIC | Age: 74
End: 2024-09-10
Payer: MEDICAID

## 2024-09-10 ENCOUNTER — TELEPHONE (OUTPATIENT)
Dept: URGENT CARE | Facility: CLINIC | Age: 74
End: 2024-09-10

## 2024-09-10 ENCOUNTER — PHARMACY VISIT (OUTPATIENT)
Dept: PHARMACY | Facility: MEDICAL CENTER | Age: 74
End: 2024-09-10
Payer: COMMERCIAL

## 2024-09-10 VITALS
SYSTOLIC BLOOD PRESSURE: 102 MMHG | HEART RATE: 108 BPM | DIASTOLIC BLOOD PRESSURE: 68 MMHG | TEMPERATURE: 97.5 F | OXYGEN SATURATION: 91 %

## 2024-09-10 DIAGNOSIS — B34.9 VIRAL ILLNESS: ICD-10-CM

## 2024-09-10 DIAGNOSIS — R11.2 NAUSEA AND VOMITING, UNSPECIFIED VOMITING TYPE: Primary | ICD-10-CM

## 2024-09-10 LAB
FLUAV RNA SPEC QL NAA+PROBE: NEGATIVE
FLUBV RNA SPEC QL NAA+PROBE: NEGATIVE
RSV RNA SPEC QL NAA+PROBE: NEGATIVE
SARS-COV-2 RNA RESP QL NAA+PROBE: NEGATIVE

## 2024-09-10 PROCEDURE — RXMED WILLOW AMBULATORY MEDICATION CHARGE

## 2024-09-10 PROCEDURE — 87637 SARSCOV2&INF A&B&RSV AMP PRB: CPT | Mod: QW

## 2024-09-10 PROCEDURE — RXOTC WILLOW AMBULATORY OTC CHARGE: Performed by: PHARMACIST

## 2024-09-10 PROCEDURE — 3074F SYST BP LT 130 MM HG: CPT

## 2024-09-10 PROCEDURE — 3078F DIAST BP <80 MM HG: CPT

## 2024-09-10 PROCEDURE — 99203 OFFICE O/P NEW LOW 30 MIN: CPT

## 2024-09-10 RX ORDER — ONDANSETRON 4 MG/1
4 TABLET, ORALLY DISINTEGRATING ORAL ONCE
Status: COMPLETED | OUTPATIENT
Start: 2024-09-10 | End: 2024-09-10

## 2024-09-10 RX ORDER — FUROSEMIDE 20 MG
20 TABLET ORAL 2 TIMES DAILY
COMMUNITY

## 2024-09-10 RX ORDER — ONDANSETRON 4 MG/1
4 TABLET, ORALLY DISINTEGRATING ORAL EVERY 6 HOURS PRN
Qty: 15 TABLET | Refills: 0 | Status: SHIPPED | OUTPATIENT
Start: 2024-09-10

## 2024-09-10 RX ORDER — PREGABALIN 25 MG/1
25 CAPSULE ORAL 3 TIMES DAILY
COMMUNITY

## 2024-09-10 RX ADMIN — ONDANSETRON 4 MG: 4 TABLET, ORALLY DISINTEGRATING ORAL at 19:08

## 2024-09-10 ASSESSMENT — ENCOUNTER SYMPTOMS
VOMITING: 1
FEVER: 0
ABDOMINAL PAIN: 0
NAUSEA: 1
BLOOD IN STOOL: 0
DIARRHEA: 1
CONSTIPATION: 0
HEARTBURN: 0
CHILLS: 0

## 2024-09-11 NOTE — PROGRESS NOTES
Verbal consent was acquired by the patient to use [facility] listening note generation during this visit   Subjective:   Noemi Lopez is a 74 y.o. female who presents for Diarrhea (Diarrhea x 2 days, vomiting this am, can't keep fluids down, weak)      HPI:  History of Present Illness  The patient is a 74-year-old female who presents for evaluation of diarrhea and vomiting. She is accompanied by her daughter-in-law.    She has been experiencing diarrhea since yesterday, which was followed by episodes of vomiting today. Her symptoms began after traveling from [place] yesterday. Prior to her flight, she was in good health. She has had three instances of watery diarrhea today, with no presence of blood in the stool. She reports feeling nauseous and has vomited, but currently does not feel nauseous.She was given Imodium at noon today.She reports no fevers, headaches, or abdominal pain.          Review of Systems   Constitutional:  Negative for chills and fever.   Gastrointestinal:  Positive for diarrhea, nausea and vomiting. Negative for abdominal pain, blood in stool, constipation, heartburn and melena.       Medications:    Current Outpatient Medications on File Prior to Visit   Medication Sig Dispense Refill    pregabalin (LYRICA) 25 MG Cap Take 25 mg by mouth 3 times a day.      furosemide (LASIX) 20 MG Tab Take 20 mg by mouth 2 times a day.      omeprazole (PRILOSEC) 20 MG delayed-release capsule Take 20 mg by mouth every day.      SITagliptin (JANUVIA) 25 MG Tab Take 25 mg by mouth every day.      aspirin 81 MG EC tablet Take 1 Tab by mouth every day. 30 Tab 0    atorvastatin (LIPITOR) 80 MG tablet Take 1 Tab by mouth every evening. 30 Tab 0    enalapril (VASOTEC) 5 MG Tab Take 1 Tab by mouth every day. 30 Tab 0    metFORMIN (GLUCOPHAGE) 850 MG Tab Take 1 Tab by mouth 2 times a day, with meals. 60 Tab 0    magnesium oxide (MAG-OX) 400 (241.3 Mg) MG Tab tablet Take 1 Tab by mouth every day.  (Patient not taking: Reported on 9/10/2024) 30 Tab 0    vitamin D (CHOLECALCIFEROL) 1000 UNIT Tab Take 2 Tabs by mouth every day. 30 Tab      No current facility-administered medications on file prior to visit.        Allergies:   Pcn [penicillins] and Thiamine    Problem List:   Patient Active Problem List   Diagnosis    Stroke, lacunar (HCC)    Type 2 diabetes mellitus with hyperglycemia, without long-term current use of insulin (HCC)    Essential hypertension    Vitamin D deficiency    Double vision    Age-related nuclear cataract of both eyes    Hyperopia of both eyes with astigmatism        Surgical History:  No past surgical history on file.    Past Social Hx:   Social History     Tobacco Use    Smoking status: Never    Smokeless tobacco: Never   Vaping Use    Vaping status: Never Used   Substance Use Topics    Alcohol use: Never    Drug use: Never          Problem list, medications, and allergies reviewed by myself today in Epic.     Objective:     /68   Pulse (!) 108   Temp 36.4 °C (97.5 °F) (Temporal)   SpO2 91%     Physical Exam  Vitals and nursing note reviewed.   Constitutional:       General: She is not in acute distress.     Appearance: Normal appearance. She is normal weight. She is ill-appearing. She is not toxic-appearing or diaphoretic.   HENT:      Head: Normocephalic and atraumatic.      Mouth/Throat:      Mouth: Mucous membranes are moist.      Pharynx: Oropharynx is clear. No oropharyngeal exudate or posterior oropharyngeal erythema.   Cardiovascular:      Rate and Rhythm: Normal rate and regular rhythm.      Pulses: Normal pulses.      Heart sounds: Normal heart sounds. No murmur heard.     No friction rub. No gallop.   Pulmonary:      Effort: Pulmonary effort is normal. No respiratory distress.      Breath sounds: Normal breath sounds. No stridor. No wheezing, rhonchi or rales.   Chest:      Chest wall: No tenderness.   Abdominal:      General: Abdomen is flat. Bowel sounds are  normal. There is no distension.      Palpations: Abdomen is soft. There is no mass.      Tenderness: There is no abdominal tenderness. There is no guarding or rebound.      Hernia: No hernia is present.   Musculoskeletal:      Cervical back: Neck supple. No tenderness.   Lymphadenopathy:      Cervical: No cervical adenopathy.   Skin:     General: Skin is warm and dry.      Capillary Refill: Capillary refill takes less than 2 seconds.   Neurological:      General: No focal deficit present.      Mental Status: She is alert and oriented to person, place, and time. Mental status is at baseline.      Cranial Nerves: No cranial nerve deficit.      Motor: No weakness.      Gait: Gait normal.   Psychiatric:         Mood and Affect: Mood normal.         Behavior: Behavior normal.         Thought Content: Thought content normal.         Judgment: Judgment normal.         Assessment/Plan:     Diagnosis and associated orders:   1. Nausea and vomiting, unspecified vomiting type  ondansetron (Zofran ODT) dispertab 4 mg    ondansetron (ZOFRAN ODT) 4 MG TABLET DISPERSIBLE      2. Viral illness  POCT CoV-2, Flu A/B, RSV by PCR         Results for orders placed or performed in visit on 09/10/24   POCT CoV-2, Flu A/B, RSV by PCR   Result Value Ref Range    SARS-CoV-2 by PCR Negative Negative, Invalid    Influenza virus A RNA Negative Negative, Invalid    Influenza virus B, PCR Negative Negative, Invalid    RSV, PCR Negative Negative, Invalid       Comments/MDM:   Pt is clinically stable at today's acute urgent care visit.  No acute distress noted. Appropriate for outpatient management at this time.     Assessment & Plan    She has experienced diarrhea and vomiting since yesterday, with no fever, body aches, chills, respiratory symptoms, or abdominal pain. Viral panel is negative. An antinausea medication, Zofran, will be administered in the clinic, with subsequent doses every 6 hours PRN. She is advised to continue with Imodium,  maintain a bland diet, and consume Pedialyte to ensure hydration.                 Discussed DDx, management options (risks,benefits, and alternatives to planned treatment), natural progression and supportive care.  Expressed understanding and the treatment plan was agreed upon. Questions were encouraged and answered   Return to urgent care prn if new or worsening sx or if there is no improvement in condition prn.    Educated in Red flags and indications to immediately call 911 or present to the Emergency Department.   Advised the patient to follow-up with the primary care physician for recheck, reevaluation, and consideration of further management.    I personally reviewed prior external notes and test results pertinent to today's visit.  I have independently reviewed and interpreted all diagnostics ordered during this urgent care acute visit.       Please note that this dictation was created using voice recognition software. I have made a reasonable attempt to correct obvious errors, but I expect that there are errors of grammar and possibly content that I did not discover before finalizing the note.    This note was electronically signed by ARLEEN Batista

## 2025-01-22 NOTE — REHAB-COLLABORATIVE ONGOING IDT TEAM NOTE
Progress Note - Surgery-General   Name: Samantha Rodriguez 59 y.o. female I MRN: [id number]  Unit/Bed#: [facility] 615-01 I Date of Admission: 1/9/2025   Date of Service: 1/22/2025 I Hospital Day: 13    Assessment & Plan  Small bowel obstruction due to adhesions (HCC)  1/14 s/p ex lap, ALLISON of obstructive pelvic adhesions.  Operative findings suggestive of adhesive distal obstruction and possible closed-loop obstruction.  She is hemodynamically stable this morning.     Plan:  -Regular diet as tolerated  -Maintain PICC  -Continue antibiotics through 2/21 w/ PICC  -Appreciate APS recommendations  -cleared for d/c from orthopedic stand point; appreciate recs  -proveena management per ortho  -PT OT: Level 2  -DVT prophylaxis  -dispo planning      Please contact the SecureChat role for the Surgery-General service with any questions/concerns.    Subjective   No acute events overnight. She continues to have some bloating and epigastric pain that is mild. Denies nausea, vomiting, fevers, chills, chest pain, shortness of breath. Tolerating a diet. Having bowel movements and passing flatus.     Objective :  Temp:  [97.4 °F (36.3 °C)-98.5 °F (36.9 °C)] 98.2 °F (36.8 °C)  HR:  [66-74] 66  BP: (104-145)/(63-92) 114/74  Resp:  [16-18] 17  SpO2:  [96 %-99 %] 96 %  O2 Device: None (Room air)    I/O         01/20 0701  01/21 0700 01/21 0701  01/22 0700 01/22 0701  01/23 0700    P.O. 150 580     I.V. (mL/kg)       IV Piggyback  50     Total Intake(mL/kg) 150 (1.5) 630 (6.3)     Urine (mL/kg/hr) 900 (0.4) 1251 (0.5)     Drains 0 0     Stool       Total Output 900 1251     Net -750 -621            Unmeasured Urine Occurrence  2 x           Lines/Drains/Airways       Active Status       Name Placement date Placement time Site Days    PICC Line 01/13/25 Left Basilic 01/13/25  1118  Basilic  8                  Physical Exam  Constitutional:       Appearance: Normal appearance.   HENT:      Head: Normocephalic and atraumatic.      Right Ear:  Weekly Interdisciplinary Team Conference Note    Weekly Interdisciplinary Team Conference # 1  Date:  8/26/2019    Clinicians present and reporting at team conference include the following:   MD: Tereza Henderson MD   RN:  Elayne Cobb, RN    PT:   Cathleen Lewis, PT, DPT  OT: Shelia Barahona MS,OTR/L  ST:  Not Applicable.   CM:  Tracey Quintanilla RN  REC:  Not Applicable  RT:  Not Applicable  RPh:  Maryanne Mobley, [facility]  Other:   Dr. Amaral  All reporting clinicians have a working knowledge of this patient's plan of care.    Targeted DC Date:  8/28/2019     Medical    Patient Active Problem List    Diagnosis Date Noted   • Stroke, lacunar (HCC) 08/19/2019     Priority: High   • Elevated troponin 08/19/2019     Priority: Medium   • Hypomagnesemia 08/24/2019   • Vitamin D deficiency 08/24/2019   • Type 2 diabetes mellitus with hyperglycemia, without long-term current use of insulin (HCC) 08/19/2019   • Essential hypertension 08/19/2019     Results     Procedure Component Value Ref Range Date/Time    ACCU-CHEK GLUCOSE [[id number]]  (Abnormal) Collected:  08/26/19 1123    Order Status:  Completed Lab Status:  Final result Updated:  08/26/19 1141     Glucose - Accu-Ck 219 65 - 99 mg/dL     ACCU-CHEK GLUCOSE [[id number]]  (Abnormal) Collected:  08/26/19 0805    Order Status:  Completed Lab Status:  Final result Updated:  08/26/19 0821     Glucose - Accu-Ck 177 65 - 99 mg/dL     ACCU-CHEK GLUCOSE [[id number]]  (Abnormal) Collected:  08/25/19 2204    Order Status:  Completed Lab Status:  Final result Updated:  08/26/19 0056     Glucose - Accu-Ck 162 65 - 99 mg/dL      Comment: Coverage given  Followed orders  Notified provider         ACCU-CHEK GLUCOSE [[id number]]  (Abnormal) Collected:  08/25/19 1725    Order Status:  Completed Lab Status:  Final result Updated:  08/25/19 1739     Glucose - Accu-Ck 197 65 - 99 mg/dL         Nursing  Diet/Nutrition:  Diabetic and Thin Liquids  Medication Administration:  Whole with Liquid Wash  % consumed at  External ear normal.      Left Ear: External ear normal.      Nose: Nose normal.   Eyes:      Conjunctiva/sclera: Conjunctivae normal.   Cardiovascular:      Rate and Rhythm: Normal rate.      Comments: Appears well perfused  Pulmonary:      Effort: Pulmonary effort is normal. No respiratory distress.   Abdominal:      General: Abdomen is flat. There is distension.      Palpations: Abdomen is soft.      Tenderness: There is abdominal tenderness (mild epigastric). There is no guarding or rebound.   Skin:     General: Skin is warm and dry.   Neurological:      Mental Status: She is alert and oriented to person, place, and time.   Psychiatric:         Mood and Affect: Mood normal.         Behavior: Behavior normal.           Lab Results: I have reviewed the following results:  Recent Labs     01/20/25  0549 01/21/25  0543 01/22/25  0452   WBC 5.72   < > 5.74   HGB 7.9*   < > 7.9*   HCT 27.1*   < > 26.9*   *   < > 450*   SODIUM 141   < > 143   K 3.6   < > 3.7      < > 106   CO2 29   < > 28   BUN 5   < > 6   CREATININE 0.50*   < > 0.62   GLUC 104   < > 85   MG 1.9  --   --    PHOS 3.4  --   --     < > = values in this interval not displayed.       Imaging Results Review: No pertinent imaging studies reviewed.  Other Study Results Review: No additional pertinent studies reviewed.    VTE Pharmacologic Prophylaxis: Enoxaparin (Lovenox)  VTE Mechanical Prophylaxis: sequential compression device   meals in last 24 hours:  Consumed % of meals per documentation.  Meal/Snack Consumption for the past 24 hrs:   Oral Nutrition   08/25/19 1830 Dinner;Between % Consumed   08/25/19 1300 Lunch;Between % Consumed     Snack schedule:  HS  Appetite:  Good  Fluid Intake/Output in past 24 hours: In: 650 [P.O.:650]  Out: -   Admit Weight:  Weight: 68 kg (149 lb 14.6 oz)  Weight Last 7 Days   [68 kg (149 lb 14.6 oz)] 68 kg (149 lb 14.6 oz) (08/21 1122)    Pain Issues:    Location:  Denies          Severity:  Denies   Scheduled pain medications:  None     PRN pain medications used in last 24 hours:  None   Non Pharmacologic Interventions:  distraction and emotional support  Effectiveness of pain management plan:  good=patient states acceptable comfort after interventions    Bowel:    Bowel Assist Initial Score:  4 - Minimal Assistance  Bowel Assist Current Score:  5 - Standby Prompting/Supervision or Set-up  Bowl Accidents in last 7 days:  0  Last bowel movement: 08/25/19(Per pt and family.)  Stool Description: Small, Loose     Usual bowel pattern:  daily  Scheduled bowel medications:  senna-docusate (PERICOLACE or SENOKOT S)   PRN bowel medications used in last 24 hours:  None  Nursing Interventions:  Increased time, Scheduled medication, Supervision, Verbal cueing  Effectiveness of bowel program:   good=regular, predictable, controlled emptying of bowel  Bladder:    Bladder Assist Initial Score:  4 - Minimal Assistance  Bladder Assist Current Score:  5 - Standby Prompting/Supervision or Set-up  Bladder Accidents in last 7 days:  0  PVR range for past 24-48 hours:  [22]  ()  Medications affecting bladder:  None    Time void schedule/voiding pattern:  Pt initiates voiding  Interventions:  Increased time, Supervision, Verbal cueing  Effectiveness of bladder training:  Good=regular, predictable, emptying of bladder, patient initiates time voiding   Diabetes:  Blood Sugar Frequency:  Before meals and at bedtime     Range of BS for last 48 hours:     Recent Labs     08/24/19  0800 08/24/19  1109 08/24/19  1736 08/24/19  2030 08/25/19  0812 08/25/19  1133 08/25/19  1725 08/25/19  2204   POCGLUCOSE 165* 391* 109* 222* 173* 166* 197* 162*     Scheduled diabetic medications:  metformin (GLUCOPHAGE) , Januvia  Sliding scale usage in past 24 hours:  Yes  Total Short acting insulin in the past 24 hours:  Humulin 8 units  Total Long acting insulin in the past 24 hours:  None      Sleep/wake cycle:   Average hours slept:  Sleeps 4-6 hours without waking  Sleep medication usage:  None    Patient/Family Training/Education:  Diabetes Management, Fall Prevention, General Self Care, Safe Transfers, Safety and Skin Care  Discharge Supply Recommendations:  Glucometer and Strips  Strengths: Alert and oriented, Supportive family and Pleasant and cooperative   Barriers:   Generalized weakness       Nursing Problems     Problem: Bowel/Gastric:     Description:     Goal: Normal bowel function is maintained or improved     Description:           Goal: Will not experience complications related to bowel motility     Description:                 Problem: Communication     Description:     Goal: The ability to communicate needs accurately and effectively will improve     Description:                 Problem: Discharge Barriers/Planning     Description:     Goal: Patient's continuum of care needs will be met     Description:                 Problem: GLYCEMIA IMBALANCE     Description:     Goal: Clinical indication of glycemia balance is achieved     Description:                 Problem: Infection     Description:     Goal: Will remain free from infection     Description:                 Problem: Knowledge Deficit     Description:     Goal: Knowledge of disease process/condition, treatment plan, diagnostic tests, and medications will improve     Description:           Goal: Knowledge of the prescribed therapeutic regimen will improve     Description:                    Problem: Pain Management     Description:     Goal: Pain level will decrease to patient's comfort goal     Description:     Flowsheet:     Taken at 08/25/19 0824    Pain Rating Scale (NPRS) 0 - No Pain by  Elayne Cobb R.N.    Comfort Goal Comfort at Rest;Comfort with Movement;Perform Activity;Stay Alert by  Elayne Cobb R.N.                      Problem: Respiratory:     Description:     Goal: Respiratory status will improve     Description:                 Problem: Safety     Description:     Goal: Will remain free from injury     Description:           Goal: Will remain free from falls     Description:                 Problem: Venous Thromboembolism (VTW)/Deep Vein Thrombosis (DVT) Prevention:     Description:     Goal: Patient will participate in Venous Thrombosis (VTE)/Deep Vein Thrombosis (DVT)Prevention Measures     Description:     Flowsheet:     Taken at 08/24/19 0930    Pharmacologic Prophylaxis Used LMWH: Enoxaparin(Lovenox) by  Elayne Cobb R.N. Comment:  ASA                             Long Term Goals:   At discharge patient will be able to function safely at home and in the community with support.    Section completed by:  Washington Vergara R.N.           Mobility  Bed mobility:   SBA  Bed /Chair/Wheelchair Transfer Initial:  1 - Total Assistance  Bed /Chair/Wheelchair Transfer Current:  4 - Minimal Assistance   Bed/Chair/Wheelchair Transfer Description:  Increased time, Verbal cueing, Set-up of equipment(Bed <> WC, CGA without AD)  Walk Initial:  4 - Minimal Assistance  Walk Current:  4 - Minimal Assistance   Walk Description:  Extra time, Requires incidental assist, Supervision for safety, Verbal cueing, Walker(150' x 1 and two bouts of 75' FWW CGA-during FT)  Wheelchair Initial:  5 - Standby Prompting/Supervision or Set-up  Wheelchair Current:  5 - Standby Prompting/Supervision or Set-up   Wheelchair Description:  Extra time, Supervision for safety(Wc propulsion 150'  "SBA with increased time and VC)  Stairs Initial:  2 - Max Assistance  Stairs Current: 2 - Max Assistance   Stairs Description: Extra time, Verbal cueing, Hand rails, Requires incidental assist(6 4\" steps with B HR, CGA, step to pattern )  Patient/Family Training/Education:  Ongoing with family  DME/DC Recommendations:  TBD, possible FWW and manual WC  Strengths:  Independent PLOF, Making steady progress towards goals, Motivated for self care and independence, Pleasant and cooperative, Supportive family and Willingly participates in therapeutic activities  Barriers:   Decreased endurance, Poor balance and Other: diplopia, dizziness, mild impulsivity  # of short term goals set= 3  # of short term goals met=1  Physical Therapy Problems     Problem: Mobility     Dates: Start: 08/22/19       Description:     Goal: STG-Within one week, patient will ambulate community distances     Dates: Start: 08/22/19       Description: 1) Individualized goal:  250' CGA, no AD  2) Interventions:  PT Group Therapy, PT Gait Training, PT Therapeutic Exercises, PT Neuro Re-Ed/Balance and PT Therapeutic Activity       Note:     Goal Note filed on 08/26/19 1213 by Mariza Rojas, Student    250' with HHA                        Problem: Mobility Transfers     Dates: Start: 08/22/19       Description:     Goal: STG-Within one week, patient will transfer bed to chair     Dates: Start: 08/22/19       Description: 1) Individualized goal:  Bed <> Wc, SPV, no AD  2) Interventions:  PT Group Therapy, PT Gait Training, PT Therapeutic Exercises, PT Neuro Re-Ed/Balance and PT Therapeutic Activity        Note:     Goal Note filed on 08/26/19 1213 by Mariza Rojas, Student    To be assessed                        Problem: PT-Long Term Goals     Dates: Start: 08/22/19       Description:     Goal: LTG-By discharge, patient will maintain balance     Dates: Start: 08/22/19       Description: 1) Individualized goal:  Complete standardized balance assessment " "with score indicating low fall risk  2) Interventions:  PT Group Therapy, PT Gait Training, PT Therapeutic Exercises, PT Neuro Re-Ed/Balance and PT Therapeutic Activity             Goal: LTG-By discharge, patient will ambulate     Dates: Start: 08/22/19       Description: 1) Individualized goal:  500' over varied surfaces, mod I, LRAD  2) Interventions:  PT Group Therapy, PT Gait Training, PT Therapeutic Exercises, PT Neuro Re-Ed/Balance and PT Therapeutic Activity             Goal: LTG-By discharge, patient will ambulate up/down flight of stairs     Dates: Start: 08/22/19       Description: 1) Individualized goal:  12 6\" steps, SPV, B HRs  2) Interventions:  PT Group Therapy, PT Gait Training, PT Therapeutic Exercises, PT Neuro Re-Ed/Balance and PT Therapeutic Activity             Goal: LTG-By discharge, patient will transfer in/out of a car     Dates: Start: 08/22/19       Description: 1) Individualized goal:  SPV, LRAD  2) Interventions:  PT Group Therapy, PT Gait Training, PT Therapeutic Exercises, PT Neuro Re-Ed/Balance and PT Therapeutic Activity                           Section completed by:  Mariza Rojas, Student; Cathleen Lewis, PT, DPT    Activities of Daily Living  Eating Initial:  5 - Standby Prompting/Supervision or Set-up  Eating Current:  5 - Standby Prompting/Supervision or Set-up   Eating Description:  Increased time, Supervision for safety(SBA with family present for meal.  No observable difficulty with loading/unloading utensils)  Grooming Initial:  4 - Minimal Assistance  Grooming Current:  6 - Modified Independent   Grooming Description:  Increased time(Patient brushed hair with mod I at w/c level)  Bathing Initial:  5 - Standby Prompting/Supervision or Set-up  Bathing Current:  5 - Standby Prompting/Supervision or Set-up   Bathing Description:  Grab bar, Tub bench, Hand rails, Hand held shower, Increased time, Supervision for safety, Set-up of equipment(Patient washed, rinsed and dried all body " parts while incorporating sitting/standing with SBA. Used grab bar for steadying assist while standing to wash tia area/ buttocks. No LOB noted during bathing task. Set-up provided prior to task. )  Upper Body Dressing Initial:  4 - Minimal Assistance  Upper Body Dressing Current:  6 - Modified Independent   Upper Body Dressing Description:  (Patient retrieved clothing from closet and donned bra and long-sleeve shirt with mod I (w/c level))  Lower Body Dressing Initial:  5 - Standby Prompting/Supervsion or Set-up  Lower Body Dressing Current:  5 - Standby Prompting/Supervsion or Set-up   Lower Body Dressing Description:  5 - Standby Prompting/Supervsion or Set-up  Toileting Initial:  5 - Standby Prompting/Supervision or Set-up  Toileting Current:  6 - Modified Independent   Toileting Description:  Grab bar, Increased time, Supervision for safety(SUP level secondary to safety concerns, no VQs or tactile cues to utilize grab bar before/after task.  No LOB )  Toilet Transfer Initial:  5 - Standby Prompting/Supervision or Set-up  Toilet Transfer Current:  5 - Standby Prompting/Supervision or Set-up   Toilet Transfer Description:  5 - Standby Prompting/Supervision or Set-up  Tub / Shower Transfer Initial:  4 - Minimal Assistance  Tub / Shower Transfer Current:  5 - Standby Prompting/Supervision or Set-up   Tub / Shower Transfer Description:  Increased time, Supervision for safety(Patient transferred w/c <> shower chair with supervision for safety (required cue to lock w/c breaks prior to standing)  IADL:  Patient completed meal prep task (cooked egg) on 8/22 with CGA to SBA for standing balance and safety. Used counter for UE support while ambulating in kitchen.   Family Training/Education:  Ongoing with family. family training completed during weekend for shower activity and safety/environmental considerations. Family is extremely supportive and involved in patient's care.   DME/DC Recommendations:  Shower chair, grab  bars, non-skid bath mat, hand-held shower head recommended to facilitate safety and independence with bathing tasks.     Strengths:  Able to follow instructions, Adequate strength, Alert and oriented, Good insight into deficits/needs, Independent PLOF, Making steady progress towards goals, Manages pain appropriately, Motivated for self care and independence, Pleasant and cooperative, Supportive family and Willingly participates in therapeutic activities  Barriers:  Decreased endurance, Language barrier, non-fluent English and Other: decreased balance and diplopia     # of short term goals set= 3   # of short term goals met= 2    Occupational Therapy Goals     Problem: Bathing     Dates: Start: 08/21/19       Description:     Goal: STG-Within one week, patient will bathe     Dates: Start: 08/21/19       Description: 1) Individualized Goal:  Mod I with AE/DME PRN  2) Interventions:  OT Group Therapy, OT Self Care/ADL, OT Cognitive Skill Dev, OT Community Reintegration, OT Neuro Re-Ed/Balance, OT Sensory Int Techniques, OT Therapeutic Activity and OT Evaluation                   Problem: OT Long Term Goals     Dates: Start: 08/21/19       Description:     Goal: LTG-By discharge, patient will complete basic self care tasks     Dates: Start: 08/21/19       Description: 1) Individualized Goal:  Mod I with AE/DME PRN  2) Interventions:  OT Group Therapy, OT Self Care/ADL, OT Cognitive Skill Dev, OT Community Reintegration, OT Neuro Re-Ed/Balance, OT Sensory Int Techniques, OT Therapeutic Activity and OT Evaluation             Goal: LTG-By discharge, patient will perform bathroom transfers     Dates: Start: 08/21/19       Description: 1) Individualized Goal:  Mod I with AE/DME PRN  2) Interventions:  OT Group Therapy, OT Self Care/ADL, OT Cognitive Skill Dev, OT Community Reintegration, OT Neuro Re-Ed/Balance, OT Sensory Int Techniques, OT Therapeutic Activity and OT Evaluation                         Section completed by:   Shelia Barahona MS,OTR/L             REHAB-Pharmacy IDT Team Note by Jason Osborn RPH at 8/23/2019  2:22 PM  Version 2 of 2    Author:  Jason Osborn RPH Service:  -- Author Type:  Pharmacist    Filed:  8/23/2019  2:23 PM Date of Service:  8/23/2019  2:22 PM Status:  Addendum    :  Jason Osborn RPH (Pharmacist)    Related Notes:  Original Note by Jason Osborn RPH (Pharmacist) filed at 8/23/2019  2:23 PM         Pharmacy   Pharmacy  Antibiotics/Antifungals/Antivirals:  Medication:      Active Orders (From admission, onward)    None        Route:        NA  Stop Date:  NA  Reason:      NA  Antihypertensives/Cardiac:  Medication:    Orders (72h ago, onward)     Start     Ordered    08/22/19 0600  enalapril (VASOTEC) tablet 5 mg  Q DAY      08/21/19 1653    08/21/19 2100  atorvastatin (LIPITOR) tablet 80 mg  EVERY EVENING      08/21/19 1101    08/21/19 1101  hydrALAZINE (APRESOLINE) tablet 10 mg  EVERY 8 HOURS PRN      08/21/19 1101              Patient Vitals for the past 24 hrs:   BP Pulse   08/23/19 0640 124/66 74   08/23/19 0510 120/70 --   08/22/19 1850 127/68 85     Anticoagulation:  Medication: Aspirin[AW.1]      Other key medications: A review of the medication list reveals no issues at this time. Patient is currently on antihypertensive(s). Recommend home blood pressure monitoring/recording if antihypertensive(s) regimen(s) continue. Patient is currently on diabetic medication(s) and/or Insulin(s). Recommend home blood glucose monitoring/recording if these regimen(s) continue.    Section completed by: Jason Osborn [facility][AW.2]     Attribution [place]     AW.1 - Jason Osborn RPH on 8/23/2019  2:22 PM   AW.2 - Jason Osborn RPH on 8/23/2019  2:23 PM                REHAB-Pharmacy IDT Team Note by Jason Osborn RPH at 8/23/2019  2:22 PM  Version 1 of 2    Author:  Jason Osborn RPH Service:  -- Author Type:  Pharmacist    Filed:  8/23/2019  2:23 PM Date of Service:  8/23/2019  2:22  PM Status:  Signed    :  Jason Osborn RPH (Pharmacist)    Related Notes:  Addendum by Jason Osborn RPH (Pharmacist) filed at 8/23/2019  2:23 PM         Pharmacy   Pharmacy  Antibiotics/Antifungals/Antivirals:  Medication:      Active Orders (From admission, onward)    None        Route:        NA  Stop Date:  NA  Reason:      NA  Antihypertensives/Cardiac:  Medication:    Orders (72h ago, onward)     Start     Ordered    08/22/19 0600  enalapril (VASOTEC) tablet 5 mg  Q DAY      08/21/19 1653    08/21/19 2100  atorvastatin (LIPITOR) tablet 80 mg  EVERY EVENING      08/21/19 1101    08/21/19 1101  hydrALAZINE (APRESOLINE) tablet 10 mg  EVERY 8 HOURS PRN      08/21/19 1101              Patient Vitals for the past 24 hrs:   BP Pulse   08/23/19 0640 124/66 74   08/23/19 0510 120/70 --   08/22/19 1850 127/68 85     Anticoagulation:  Medication: Aspirin      Other key medications:  Section completed by:  Jason Osborn RPH[AW.1]        Attribution [place]     AW.1 - Jason Osborn RPH on 8/23/2019  2:22 PM                  DC Planning  DC destination/dispostion:  Single level home, 2 entry steps in [place], [place] with son and daughter-in-law.     Referrals: None at this time.    DC Needs: Needs PCP. Anticipate home health care. DME for mobility and safety - has no DME.    Barriers to discharge:   Functional deficits. Primary language is Japanese.    Strengths: Independent PLOF; motivated; supportive family    Section completed by:  Tracey Quintanilla R.N.      Physician Summary  Tereza Henderson MD participated and led team conference discussion.